# Patient Record
Sex: MALE | Race: WHITE | NOT HISPANIC OR LATINO | URBAN - METROPOLITAN AREA
[De-identification: names, ages, dates, MRNs, and addresses within clinical notes are randomized per-mention and may not be internally consistent; named-entity substitution may affect disease eponyms.]

---

## 2019-02-11 ENCOUNTER — INPATIENT (INPATIENT)
Facility: HOSPITAL | Age: 74
LOS: 3 days | Discharge: HOME | DRG: 246 | End: 2019-02-15
Attending: INTERNAL MEDICINE | Admitting: INTERNAL MEDICINE
Payer: MEDICARE

## 2019-02-11 VITALS
OXYGEN SATURATION: 95 % | DIASTOLIC BLOOD PRESSURE: 75 MMHG | HEIGHT: 71 IN | SYSTOLIC BLOOD PRESSURE: 169 MMHG | RESPIRATION RATE: 16 BRPM

## 2019-02-11 DIAGNOSIS — R55 SYNCOPE AND COLLAPSE: ICD-10-CM

## 2019-02-11 LAB
GLUCOSE BLDC GLUCOMTR-MCNC: 216 MG/DL — HIGH (ref 70–99)
GLUCOSE BLDC GLUCOMTR-MCNC: 277 MG/DL — HIGH (ref 70–99)

## 2019-02-11 PROCEDURE — 93010 ELECTROCARDIOGRAM REPORT: CPT

## 2019-02-11 PROCEDURE — 93880 EXTRACRANIAL BILAT STUDY: CPT | Mod: 26

## 2019-02-11 PROCEDURE — 93306 TTE W/DOPPLER COMPLETE: CPT | Mod: 26

## 2019-02-11 RX ORDER — DEXTROSE 50 % IN WATER 50 %
12.5 SYRINGE (ML) INTRAVENOUS ONCE
Qty: 0 | Refills: 0 | Status: DISCONTINUED | OUTPATIENT
Start: 2019-02-11 | End: 2019-02-15

## 2019-02-11 RX ORDER — DEXTROSE 50 % IN WATER 50 %
25 SYRINGE (ML) INTRAVENOUS ONCE
Qty: 0 | Refills: 0 | Status: DISCONTINUED | OUTPATIENT
Start: 2019-02-11 | End: 2019-02-15

## 2019-02-11 RX ORDER — GLUCAGON INJECTION, SOLUTION 0.5 MG/.1ML
1 INJECTION, SOLUTION SUBCUTANEOUS ONCE
Qty: 0 | Refills: 0 | Status: DISCONTINUED | OUTPATIENT
Start: 2019-02-11 | End: 2019-02-15

## 2019-02-11 RX ORDER — ASPIRIN/CALCIUM CARB/MAGNESIUM 324 MG
81 TABLET ORAL DAILY
Qty: 0 | Refills: 0 | Status: DISCONTINUED | OUTPATIENT
Start: 2019-02-11 | End: 2019-02-15

## 2019-02-11 RX ORDER — INSULIN GLARGINE 100 [IU]/ML
13 INJECTION, SOLUTION SUBCUTANEOUS AT BEDTIME
Qty: 0 | Refills: 0 | Status: DISCONTINUED | OUTPATIENT
Start: 2019-02-11 | End: 2019-02-13

## 2019-02-11 RX ORDER — INSULIN LISPRO 100/ML
VIAL (ML) SUBCUTANEOUS AT BEDTIME
Qty: 0 | Refills: 0 | Status: DISCONTINUED | OUTPATIENT
Start: 2019-02-11 | End: 2019-02-15

## 2019-02-11 RX ORDER — SODIUM CHLORIDE 9 MG/ML
1000 INJECTION, SOLUTION INTRAVENOUS
Qty: 0 | Refills: 0 | Status: DISCONTINUED | OUTPATIENT
Start: 2019-02-11 | End: 2019-02-15

## 2019-02-11 RX ORDER — MULTIVIT-MIN/FERROUS GLUCONATE 9 MG/15 ML
1 LIQUID (ML) ORAL DAILY
Qty: 0 | Refills: 0 | Status: DISCONTINUED | OUTPATIENT
Start: 2019-02-11 | End: 2019-02-15

## 2019-02-11 RX ORDER — INSULIN LISPRO 100/ML
VIAL (ML) SUBCUTANEOUS
Qty: 0 | Refills: 0 | Status: DISCONTINUED | OUTPATIENT
Start: 2019-02-11 | End: 2019-02-15

## 2019-02-11 RX ORDER — ATORVASTATIN CALCIUM 80 MG/1
40 TABLET, FILM COATED ORAL AT BEDTIME
Qty: 0 | Refills: 0 | Status: DISCONTINUED | OUTPATIENT
Start: 2019-02-11 | End: 2019-02-15

## 2019-02-11 RX ORDER — SODIUM CHLORIDE 9 MG/ML
1000 INJECTION INTRAMUSCULAR; INTRAVENOUS; SUBCUTANEOUS
Qty: 0 | Refills: 0 | Status: DISCONTINUED | OUTPATIENT
Start: 2019-02-11 | End: 2019-02-11

## 2019-02-11 RX ORDER — DEXTROSE 50 % IN WATER 50 %
15 SYRINGE (ML) INTRAVENOUS ONCE
Qty: 0 | Refills: 0 | Status: DISCONTINUED | OUTPATIENT
Start: 2019-02-11 | End: 2019-02-15

## 2019-02-11 RX ORDER — HYDRALAZINE HCL 50 MG
25 TABLET ORAL EVERY 8 HOURS
Qty: 0 | Refills: 0 | Status: DISCONTINUED | OUTPATIENT
Start: 2019-02-11 | End: 2019-02-15

## 2019-02-11 RX ORDER — ISOSORBIDE DINITRATE 5 MG/1
5 TABLET ORAL THREE TIMES A DAY
Qty: 0 | Refills: 0 | Status: DISCONTINUED | OUTPATIENT
Start: 2019-02-11 | End: 2019-02-15

## 2019-02-11 RX ORDER — CARVEDILOL PHOSPHATE 80 MG/1
3.12 CAPSULE, EXTENDED RELEASE ORAL EVERY 12 HOURS
Qty: 0 | Refills: 0 | Status: DISCONTINUED | OUTPATIENT
Start: 2019-02-11 | End: 2019-02-15

## 2019-02-11 RX ADMIN — Medication 1: at 21:25

## 2019-02-11 RX ADMIN — Medication 25 MILLIGRAM(S): at 17:04

## 2019-02-11 RX ADMIN — ATORVASTATIN CALCIUM 40 MILLIGRAM(S): 80 TABLET, FILM COATED ORAL at 21:26

## 2019-02-11 RX ADMIN — Medication 2: at 18:15

## 2019-02-11 RX ADMIN — ISOSORBIDE DINITRATE 5 MILLIGRAM(S): 5 TABLET ORAL at 17:04

## 2019-02-11 RX ADMIN — CARVEDILOL PHOSPHATE 3.12 MILLIGRAM(S): 80 CAPSULE, EXTENDED RELEASE ORAL at 21:26

## 2019-02-11 RX ADMIN — INSULIN GLARGINE 13 UNIT(S): 100 INJECTION, SOLUTION SUBCUTANEOUS at 21:26

## 2019-02-11 NOTE — CONSULT NOTE ADULT - SUBJECTIVE AND OBJECTIVE BOX
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 , serial CT head negative for acute intracranial pathology, no visible trauma, trop 0.137-->0.239-->0.160, -->249, CKMB 4.7--> 5.8, proBNP 1948, Echo (2/7) with EF 30%, moderate decreased LVSF, mild to moderate MV regurgitation, entire lateral wall, entire inferior wall, LAD distribution, and entire septum, chest xray negative.  Patient found to be in rupali with  s.Cr. 2.3 with baseline cr 1.7 on arrival at [facility]. Patient started on aspirin, coreg 3.125mg bid, hydralizine 25 tid, isosorbide dinatrate 5mg tid.  Patient transferred to [facility] today, 2/11 for C.  renal consult called.       PAST HISTORY  --------------------------------------------------------------------------------  PAST MEDICAL & SURGICAL HISTORY:  CKD (chronic kidney disease), stage IV  DM2 (diabetes mellitus, type 2)  Hyperlipidemia  Hypertension  CAD (Coronary Artery Disease)  Depression  Fracture of Humerus    FAMILY HISTORY:  + cardiac in father. mother alive and healthy     PAST SOCIAL HISTORY: denies tobacco or alcohol use     ALLERGIES & MEDICATIONS  --------------------------------------------------------------------------------  Allergies    PCN,SULFA (Unknown)  penicillins (Hives)    Intolerances      Standing Inpatient Medications  aspirin enteric coated 81 milliGRAM(s) Oral daily  atorvastatin 40 milliGRAM(s) Oral at bedtime  carvedilol 3.125 milliGRAM(s) Oral every 12 hours  dextrose 5%. 1000 milliLiter(s) IV Continuous <Continuous>  dextrose 50% Injectable 12.5 Gram(s) IV Push once  dextrose 50% Injectable 25 Gram(s) IV Push once  dextrose 50% Injectable 25 Gram(s) IV Push once  hydrALAZINE 25 milliGRAM(s) Oral every 8 hours  insulin glargine Injectable (LANTUS) 13 Unit(s) SubCutaneous at bedtime  insulin lispro (HumaLOG) corrective regimen sliding scale   SubCutaneous three times a day before meals  insulin lispro (HumaLOG) corrective regimen sliding scale   SubCutaneous at bedtime  isosorbide   dinitrate Tablet (ISORDIL) 5 milliGRAM(s) Oral three times a day  multivitamin/minerals 1 Tablet(s) Oral daily  PARoxetine 20 milliGRAM(s) Oral daily    PRN Inpatient Medications  dextrose 40% Gel 15 Gram(s) Oral once PRN  glucagon  Injectable 1 milliGRAM(s) IntraMuscular once PRN      REVIEW OF SYSTEMS  --------------------------------------------------------------------------------  Gen: No  fevers/chills   Skin: No rashes  Head/Eyes/Ears/Mouth: No headache; Normal hearing;  No sinus pain/discomfort, sore throat  Respiratory: No dyspnea, cough, wheezing  CV: No chest pain, or palp   GI: No abdominal pain, diarrhea, nausea, vomiting  : No dysuria, decrease urination or hesitancy urinating  hematuria, nocturia  MSK: No joint pain/swelling; no back pain  Neuro: No dizziness/lightheadedness,     also with no edema     All other systems were reviewed and are negative, except as noted.    VITALS/PHYSICAL EXAM  --------------------------------------------------------------------------------  T(C): --  HR: --  BP: 139/56 (02-11-19 @ 19:30) (139/56 - 169/75)  RR: 18 (02-11-19 @ 19:30) (16 - 18)  SpO2: 98% (02-11-19 @ 19:30) (95% - 98%)  Wt(kg): --  Height (cm): 180.34 (02-11-19 @ 12:52)  Weight (kg): 97.5 (02-11-19 @ 12:52)  BMI (kg/m2): 30 (02-11-19 @ 12:52)  BSA (m2): 2.17 (02-11-19 @ 12:52)      02-11-19 @ 07:01  -  02-11-19 @ 21:10  --------------------------------------------------------  IN: 480 mL / OUT: 0 mL / NET: 480 mL      Physical Exam:  	Gen: Non toxic comfortable appearing   	no jvd   	Pulm: cta   no rales or ronchi or wheezing  	CV: RRR, S1S2; no rub  	Back: No CVA tenderness; no sacral edema  	Abd: +BS, soft, nontender/nondistended  	: No suprapubic tenderness  	UE: Warm, no cyanosis  no clubbing,  no edema  	LE: Warm, no cyanosis  no clubbing, no edema  	Neuro: alert and oriented. speech coherent   	Skin: Warm, no decrease skin turgor       LABS/STUDIES  --------------------------------------------------------------------------------                Creatinine Trend:

## 2019-02-11 NOTE — H&P CARDIOLOGY - HISTORY OF PRESENT ILLNESS
74 yo  male with PMHx of CAD (prior angioplasty but no stents, 15 years ago), HTN, depression, DMT2, HLD who was brought to [facility] after syncopal episode on 2/6, serial CT head negative for acute intracranial pathology, no visible trauma, trop 0.137-->0.239-->0.160, -->249, CKMB 4.7--> 5.8, proBNP 1948, Echo (2/7) with EF 30%, moderate decreased LVSF, mild to moderate MV regurgitation, entire lateral wall, entire inferior wall, LAD distribution, and entire septum, chest xray negative.  Patient found to be in CKD stage IV, gfr 28, s.Cr. 2.3.  Patient started on aspirin, coreg 3.125mg bid, hydralizine 25 tid, isosorbide dinatrate 5mg tid.  Patient transferred to [facility] today, 2/11 for LHC.  Patient denies any discomfort at this time.     PCP Dr Acevedo   Does not have Cardiologist 72 yo  male with PMHx of CAD (prior angioplasty but no stents, 15 years ago), HTN, depression, DMT2, HLD who was brought to [facility] after syncopal episode on 2/6, serial CT head negative for acute intracranial pathology, no visible trauma, trop 0.137-->0.239-->0.160, -->249, CKMB 4.7--> 5.8, proBNP 1948, Echo (2/7) with EF 30%, moderate decreased LVSF, mild to moderate MV regurgitation, entire lateral wall, entire inferior wall, LAD distribution, and entire septum, chest xray negative.  Patient found to be in CKD stage IV, gfr 28, s.Cr. 2.3.  Patient started on aspirin, coreg 3.125mg bid, hydralizine 25 tid, isosorbide dinatrate 5mg tid.  Patient transferred to [facility] today, 2/11 for LHC.  Patient denies any discomfort at this time.     PCP Heather Sloan [phone]  Cardiologist will be Dr Goldberg, Steven

## 2019-02-11 NOTE — H&P CARDIOLOGY - PMH
CAD (Coronary Artery Disease)    CKD (chronic kidney disease), stage IV    Depression    DM2 (diabetes mellitus, type 2)    Hyperlipidemia    Hypertension

## 2019-02-11 NOTE — CONSULT NOTE ADULT - ASSESSMENT
72 yo  male with PMHx of CAD (prior angioplasty but no stents, 15 years ago), HTN, depression, DMT2, HLD with syncopal episode on 2/6 while being now for coronary angiogram but also with SHAY on ckd III    1- SHAY on CKD III  2- HTN   3- cardiomyopathy     repeat creatinine in am. if cr cont to rise then hold angiogram unless urgently needed   ? received diuretics  at [facility] ? ATN due to syncope.   cont with hydralazine and coreg.   ivf hydration in am at 60 cc/hr 3 hours before and 6 hours after coronary angiogram   he will stand at risk for worsening renal function with coronary angiogram. this was d/w pt in detail. he understands   subnephrotic proteinuria at [facility] noted. ARB once creatinine stable and cardiac work up completed   DM cont to hold metformin   d/w cards  team   d/w Dr. Sandra  d/w pt wife at bedside as well

## 2019-02-11 NOTE — PROGRESS NOTE ADULT - SUBJECTIVE AND OBJECTIVE BOX
Chief Complaint: Patient is a 73y old  Male who presents with a chief complaint of 4 yo  male with PMHx of CAD (prior angioplasty but no stents, 15 years ago), HTN, depression, DMT2, HLD who was brought to [facility] after syncopal episode on 2/6, serial CT head negative for acute intracranial pathology, no visible trauma, trop 0.137-->0.239-->0.160, -->249, CKMB 4.7--> 5.8, proBNP 1948, Echo (2/7) with EF 30%, moderate decreased LVSF, mild to moderate MV regurgitation, entire lateral wall, entire inferior wall, LAD distribution, and entire septum, chest xray negative.  Patient found to be in CKD stage IV, gfr 28, s.Cr. 2.3.  Patient started on aspirin, coreg 3.125mg bid, hydralizine 25 tid, isosorbide dinatrate 5mg tid.  Patient transferred to [facility] today, 2/11 for LHC.  Patient denies any discomfort at this time.   4 yo  male with PMHx of CAD (prior angioplasty but no stents, 15 years ago), HTN, depression, DMT2, HLD who was brought to [facility] after syncopal episode on 2/6, serial CT head negative for acute intracranial pathology, no visible trauma, trop 0.137-->0.239-->0.160, -->249, CKMB 4.7--> 5.8, proBNP 1948, Echo (2/7) with EF 30%, moderate decreased LVSF, mild to moderate MV regurgitation, entire lateral wall, entire inferior wall, LAD distribution, and entire septum, chest xray negative.  Patient found to be in CKD stage IV, gfr 28, s.Cr. 2.3.  Patient started on aspirin, coreg 3.125mg bid, hydralizine 25 tid, isosorbide dinatrate 5mg tid.  Patient transferred to [facility] today, 2/11 for LHC.  Patient denies any discomfort at this time.       Active Meds  MEDICATIONS  (STANDING):  aspirin enteric coated 81 milliGRAM(s) Oral daily  atorvastatin 40 milliGRAM(s) Oral at bedtime  carvedilol 3.125 milliGRAM(s) Oral every 12 hours  dextrose 5%. 1000 milliLiter(s) (50 mL/Hr) IV Continuous <Continuous>  dextrose 50% Injectable 12.5 Gram(s) IV Push once  dextrose 50% Injectable 25 Gram(s) IV Push once  dextrose 50% Injectable 25 Gram(s) IV Push once  hydrALAZINE 25 milliGRAM(s) Oral every 8 hours  insulin glargine Injectable (LANTUS) 13 Unit(s) SubCutaneous at bedtime  insulin lispro (HumaLOG) corrective regimen sliding scale   SubCutaneous three times a day before meals  insulin lispro (HumaLOG) corrective regimen sliding scale   SubCutaneous at bedtime  isosorbide   dinitrate Tablet (ISORDIL) 5 milliGRAM(s) Oral three times a day  multivitamin/minerals 1 Tablet(s) Oral daily  PARoxetine 20 milliGRAM(s) Oral daily  sodium chloride 0.9%. 1000 milliLiter(s) (50 mL/Hr) IV Continuous <Continuous>    MEDICATIONS  (PRN):  dextrose 40% Gel 15 Gram(s) Oral once PRN Blood Glucose LESS THAN 70 milliGRAM(s)/deciliter  glucagon  Injectable 1 milliGRAM(s) IntraMuscular once PRN Glucose LESS THAN 70 milligrams/deciliter      Allergies  Allergies    PCN,SULFA (Unknown)  penicillins (Hives)    Intolerances        I/Os  I&O's Summary      Vital Signs  T(C): --  HR: --  BP: 169/75 (02-11-19 @ 12:52) (169/75 - 169/75)  RR: 16 (02-11-19 @ 12:52) (16 - 16)  SpO2: 95% (02-11-19 @ 12:52) (95% - 95%)  Wt(kg): --    PHYSICAL EXAM:  General: obese  HEENT: negative  Lymph Nodes:negative  Neck:   Respiratory: scattered crackles  Cardiovascular: rrr  Abdomen: + bowel sounds soft  Extremities: -  Skin: normal  Neurological:nonfocal  Psychiatry:normal        Labspending      Radiology:chest xray pending      cardiology:repeat echo pending      Assessments  Syncope and collapse  ef 30% abnormal stress history 2 angioplasty 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

## 2019-02-12 DIAGNOSIS — E11.9 TYPE 2 DIABETES MELLITUS WITHOUT COMPLICATIONS: ICD-10-CM

## 2019-02-12 DIAGNOSIS — N18.4 CHRONIC KIDNEY DISEASE, STAGE 4 (SEVERE): ICD-10-CM

## 2019-02-12 DIAGNOSIS — I25.10 ATHEROSCLEROTIC HEART DISEASE OF NATIVE CORONARY ARTERY WITHOUT ANGINA PECTORIS: ICD-10-CM

## 2019-02-12 DIAGNOSIS — I42.9 CARDIOMYOPATHY, UNSPECIFIED: ICD-10-CM

## 2019-02-12 DIAGNOSIS — E78.5 HYPERLIPIDEMIA, UNSPECIFIED: ICD-10-CM

## 2019-02-12 DIAGNOSIS — I10 ESSENTIAL (PRIMARY) HYPERTENSION: ICD-10-CM

## 2019-02-12 LAB
ANION GAP SERPL CALC-SCNC: 15 MMOL/L — SIGNIFICANT CHANGE UP (ref 5–17)
APPEARANCE UR: CLEAR — SIGNIFICANT CHANGE UP
BILIRUB UR-MCNC: NEGATIVE — SIGNIFICANT CHANGE UP
BUN SERPL-MCNC: 44 MG/DL — HIGH (ref 7–23)
CALCIUM SERPL-MCNC: 9.1 MG/DL — SIGNIFICANT CHANGE UP (ref 8.4–10.5)
CHLORIDE SERPL-SCNC: 103 MMOL/L — SIGNIFICANT CHANGE UP (ref 96–108)
CO2 SERPL-SCNC: 19 MMOL/L — LOW (ref 22–31)
COLOR SPEC: COLORLESS — SIGNIFICANT CHANGE UP
CREAT SERPL-MCNC: 2.11 MG/DL — HIGH (ref 0.5–1.3)
DIFF PNL FLD: NEGATIVE — SIGNIFICANT CHANGE UP
FERRITIN SERPL-MCNC: 79 NG/ML — SIGNIFICANT CHANGE UP (ref 30–400)
FOLATE SERPL-MCNC: 17.3 NG/ML — SIGNIFICANT CHANGE UP
GLUCOSE BLDC GLUCOMTR-MCNC: 224 MG/DL — HIGH (ref 70–99)
GLUCOSE BLDC GLUCOMTR-MCNC: 243 MG/DL — HIGH (ref 70–99)
GLUCOSE BLDC GLUCOMTR-MCNC: 266 MG/DL — HIGH (ref 70–99)
GLUCOSE BLDC GLUCOMTR-MCNC: 274 MG/DL — HIGH (ref 70–99)
GLUCOSE SERPL-MCNC: 247 MG/DL — HIGH (ref 70–99)
GLUCOSE UR QL: ABNORMAL
HBA1C BLD-MCNC: 7.3 % — HIGH (ref 4–5.6)
HCT VFR BLD CALC: 29.3 % — LOW (ref 39–50)
HGB BLD-MCNC: 10.3 G/DL — LOW (ref 13–17)
IRON SATN MFR SERPL: 19 % — SIGNIFICANT CHANGE UP (ref 16–55)
IRON SATN MFR SERPL: 50 UG/DL — SIGNIFICANT CHANGE UP (ref 45–165)
KETONES UR-MCNC: NEGATIVE — SIGNIFICANT CHANGE UP
LEUKOCYTE ESTERASE UR-ACNC: NEGATIVE — SIGNIFICANT CHANGE UP
MAGNESIUM SERPL-MCNC: 2 MG/DL — SIGNIFICANT CHANGE UP (ref 1.6–2.6)
MCHC RBC-ENTMCNC: 31.7 PG — SIGNIFICANT CHANGE UP (ref 27–34)
MCHC RBC-ENTMCNC: 35.2 GM/DL — SIGNIFICANT CHANGE UP (ref 32–36)
MCV RBC AUTO: 90.1 FL — SIGNIFICANT CHANGE UP (ref 80–100)
NITRITE UR-MCNC: NEGATIVE — SIGNIFICANT CHANGE UP
PH UR: 6 — SIGNIFICANT CHANGE UP (ref 5–8)
PLATELET # BLD AUTO: 220 K/UL — SIGNIFICANT CHANGE UP (ref 150–400)
POTASSIUM SERPL-MCNC: 4.6 MMOL/L — SIGNIFICANT CHANGE UP (ref 3.5–5.3)
POTASSIUM SERPL-SCNC: 4.6 MMOL/L — SIGNIFICANT CHANGE UP (ref 3.5–5.3)
PROT UR-MCNC: ABNORMAL
RBC # BLD: 3.25 M/UL — LOW (ref 4.2–5.8)
RBC # FLD: 13.5 % — SIGNIFICANT CHANGE UP (ref 10.3–14.5)
SODIUM SERPL-SCNC: 137 MMOL/L — SIGNIFICANT CHANGE UP (ref 135–145)
SP GR SPEC: 1.01 — SIGNIFICANT CHANGE UP (ref 1.01–1.02)
T3 SERPL-MCNC: 71 NG/DL — LOW (ref 80–200)
T4 AB SER-ACNC: 4.4 UG/DL — LOW (ref 4.6–12)
TIBC SERPL-MCNC: 269 UG/DL — SIGNIFICANT CHANGE UP (ref 220–430)
TRANSFERRIN SERPL-MCNC: 224 MG/DL — SIGNIFICANT CHANGE UP (ref 200–360)
TSH SERPL-MCNC: 2.74 UIU/ML — SIGNIFICANT CHANGE UP (ref 0.27–4.2)
UIBC SERPL-MCNC: 219 UG/DL — SIGNIFICANT CHANGE UP (ref 110–370)
UROBILINOGEN FLD QL: NEGATIVE — SIGNIFICANT CHANGE UP
VIT B12 SERPL-MCNC: 595 PG/ML — SIGNIFICANT CHANGE UP (ref 232–1245)
WBC # BLD: 5.5 K/UL — SIGNIFICANT CHANGE UP (ref 3.8–10.5)
WBC # FLD AUTO: 5.5 K/UL — SIGNIFICANT CHANGE UP (ref 3.8–10.5)

## 2019-02-12 PROCEDURE — 99152 MOD SED SAME PHYS/QHP 5/>YRS: CPT | Mod: GC

## 2019-02-12 PROCEDURE — 93454 CORONARY ARTERY ANGIO S&I: CPT | Mod: 26,GC

## 2019-02-12 PROCEDURE — 71046 X-RAY EXAM CHEST 2 VIEWS: CPT | Mod: 26

## 2019-02-12 RX ORDER — SODIUM CHLORIDE 9 MG/ML
1000 INJECTION INTRAMUSCULAR; INTRAVENOUS; SUBCUTANEOUS
Qty: 0 | Refills: 0 | Status: COMPLETED | OUTPATIENT
Start: 2019-02-12 | End: 2019-02-12

## 2019-02-12 RX ORDER — SODIUM CHLORIDE 9 MG/ML
1000 INJECTION INTRAMUSCULAR; INTRAVENOUS; SUBCUTANEOUS
Qty: 0 | Refills: 0 | Status: DISCONTINUED | OUTPATIENT
Start: 2019-02-12 | End: 2019-02-15

## 2019-02-12 RX ORDER — SODIUM CHLORIDE 9 MG/ML
1000 INJECTION INTRAMUSCULAR; INTRAVENOUS; SUBCUTANEOUS
Qty: 0 | Refills: 0 | Status: DISCONTINUED | OUTPATIENT
Start: 2019-02-12 | End: 2019-02-12

## 2019-02-12 RX ADMIN — Medication 3: at 11:40

## 2019-02-12 RX ADMIN — Medication 2: at 07:33

## 2019-02-12 RX ADMIN — CARVEDILOL PHOSPHATE 3.12 MILLIGRAM(S): 80 CAPSULE, EXTENDED RELEASE ORAL at 21:24

## 2019-02-12 RX ADMIN — Medication 25 MILLIGRAM(S): at 10:13

## 2019-02-12 RX ADMIN — Medication 1: at 21:25

## 2019-02-12 RX ADMIN — Medication 1 TABLET(S): at 10:13

## 2019-02-12 RX ADMIN — Medication 81 MILLIGRAM(S): at 05:55

## 2019-02-12 RX ADMIN — ISOSORBIDE DINITRATE 5 MILLIGRAM(S): 5 TABLET ORAL at 21:24

## 2019-02-12 RX ADMIN — Medication 25 MILLIGRAM(S): at 02:39

## 2019-02-12 RX ADMIN — INSULIN GLARGINE 13 UNIT(S): 100 INJECTION, SOLUTION SUBCUTANEOUS at 21:24

## 2019-02-12 RX ADMIN — Medication 20 MILLIGRAM(S): at 10:13

## 2019-02-12 RX ADMIN — Medication 25 MILLIGRAM(S): at 21:24

## 2019-02-12 RX ADMIN — ATORVASTATIN CALCIUM 40 MILLIGRAM(S): 80 TABLET, FILM COATED ORAL at 21:24

## 2019-02-12 RX ADMIN — ISOSORBIDE DINITRATE 5 MILLIGRAM(S): 5 TABLET ORAL at 02:40

## 2019-02-12 RX ADMIN — Medication 25 MILLIGRAM(S): at 17:51

## 2019-02-12 RX ADMIN — Medication 2: at 17:54

## 2019-02-12 RX ADMIN — ISOSORBIDE DINITRATE 5 MILLIGRAM(S): 5 TABLET ORAL at 17:51

## 2019-02-12 RX ADMIN — ISOSORBIDE DINITRATE 5 MILLIGRAM(S): 5 TABLET ORAL at 10:13

## 2019-02-12 RX ADMIN — SODIUM CHLORIDE 60 MILLILITER(S): 9 INJECTION INTRAMUSCULAR; INTRAVENOUS; SUBCUTANEOUS at 10:08

## 2019-02-12 RX ADMIN — CARVEDILOL PHOSPHATE 3.12 MILLIGRAM(S): 80 CAPSULE, EXTENDED RELEASE ORAL at 10:14

## 2019-02-12 RX ADMIN — SODIUM CHLORIDE 60 MILLILITER(S): 9 INJECTION INTRAMUSCULAR; INTRAVENOUS; SUBCUTANEOUS at 16:59

## 2019-02-12 NOTE — PROGRESS NOTE ADULT - SUBJECTIVE AND OBJECTIVE BOX
Chief Complaint: Patient is a 73y old  Male who presents with a chief complaint of   72 yo  male with PMHx of CAD (prior angioplasty but no stents, 15 years ago), HTN, depression, DMT2, HLD who was brought to [facility] after syncopal episode on 2/6, serial CT head negative for acute intracranial pathology, no visible trauma, trop 0.137-->0.239-->0.160, -->249, CKMB 4.7--> 5.8, proBNP 1948, Echo (2/7) with EF 30%, moderate decreased LVSF, mild to moderate MV regurgitation, entire lateral wall, entire inferior wall, LAD distribution, and entire septum, chest xray negative.  Patient found to be in CKD stage IV, gfr 28, s.Cr. 2.3.  Patient started on aspirin, coreg 3.125mg bid, hydralizine 25 tid, isosorbide dinatrate 5mg tid.  Patient transferred to [facility] today, 2/11 for LHC.  Patient denies any discomfort at this time.     Active Meds  MEDICATIONS  (STANDING):  aspirin enteric coated 81 milliGRAM(s) Oral daily  atorvastatin 40 milliGRAM(s) Oral at bedtime  carvedilol 3.125 milliGRAM(s) Oral every 12 hours  dextrose 5%. 1000 milliLiter(s) (50 mL/Hr) IV Continuous <Continuous>  dextrose 50% Injectable 12.5 Gram(s) IV Push once  dextrose 50% Injectable 25 Gram(s) IV Push once  dextrose 50% Injectable 25 Gram(s) IV Push once  hydrALAZINE 25 milliGRAM(s) Oral every 8 hours  insulin glargine Injectable (LANTUS) 13 Unit(s) SubCutaneous at bedtime  insulin lispro (HumaLOG) corrective regimen sliding scale   SubCutaneous three times a day before meals  insulin lispro (HumaLOG) corrective regimen sliding scale   SubCutaneous at bedtime  isosorbide   dinitrate Tablet (ISORDIL) 5 milliGRAM(s) Oral three times a day  multivitamin/minerals 1 Tablet(s) Oral daily  PARoxetine 20 milliGRAM(s) Oral daily  sodium chloride 0.9%. 1000 milliLiter(s) (60 mL/Hr) IV Continuous <Continuous>    MEDICATIONS  (PRN):  dextrose 40% Gel 15 Gram(s) Oral once PRN Blood Glucose LESS THAN 70 milliGRAM(s)/deciliter  glucagon  Injectable 1 milliGRAM(s) IntraMuscular once PRN Glucose LESS THAN 70 milligrams/deciliter      Allergies  Allergies    PCN,SULFA (Unknown)  penicillins (Hives)    Intolerances        I/Os  I&O's Summary    11 Feb 2019 07:01  -  12 Feb 2019 07:00  --------------------------------------------------------  IN: 660 mL / OUT: 0 mL / NET: 660 mL    12 Feb 2019 07:01  -  12 Feb 2019 18:06  --------------------------------------------------------  IN: 480 mL / OUT: 0 mL / NET: 480 mL        Vital Signs  T(C): 36.7 (02-12-19 @ 13:25), Max: 37.2 (02-11-19 @ 21:25)  HR: 60 (02-12-19 @ 16:55) (60 - 85)  BP: 129/64 (02-12-19 @ 16:55) (118/57 - 165/75)  RR: 16 (02-12-19 @ 16:55) (16 - 18)  SpO2: 98% (02-12-19 @ 16:55) (95% - 100%)  Wt(kg): --                        10.3   5.5   )-----------( 220      ( 12 Feb 2019 03:03 )             29.3   02-12    137  |  103  |  44<H>  ----------------------------<  247<H>  4.6   |  19<L>  |  2.11<H>    Ca    9.1      12 Feb 2019 03:03  Mg     2.0     02-12      PHYSICAL EXAM:  General: normal  HEENT: -  Lymph Nodes:  Neck:   Respiratory: clear  Cardiovascular: rrr  Abdomen: +bs  Extremities: -  Skin: normal  Neurological:  Psychiatry:        Labs       Radiology:      cardiology:      Assessments  Syncope and collapse  Cardiomyopathy, unspecified type 32% ef on echo  CKD (chronic kidney disease), stage IV  Coronary artery disease involving native coronary artery of native heart without angina pectoris carotids plaque but no hemodynamically significant disease  Type 2 diabetes mellitus without complication, without long-term current use of insulin a1c 7.3 stable  Hyperlipidemia, unspecified hyperlipidemia type  Hypertension, unspecified type well controlled  CAD (Coronary Artery Disease) 99.9 %circ 99% rca lad ok difficult stent so   plan viability study

## 2019-02-12 NOTE — CONSULT NOTE ADULT - ASSESSMENT
74 yo  male with PMHx of CAD (prior angioplasty but no stents, 15 years ago), HTN, depression, DMT2, HLD who was brought to [facility] after syncopal episode on 2/6, found to have depressed LVEF and renal dysfunction.

## 2019-02-12 NOTE — PROGRESS NOTE ADULT - SUBJECTIVE AND OBJECTIVE BOX
[facility]   [phone]  RENAL FOLLOW UP NOTE  --------------------------------------------------------------------------------  Chief Complaint:    24 hour events/subjective:    seen. no sob no dizziness or lightheadedness states urinating well    PAST HISTORY  --------------------------------------------------------------------------------  No significant changes to PMH, PSH, FHx, SHx, unless otherwise noted    ALLERGIES & MEDICATIONS  --------------------------------------------------------------------------------  Allergies    PCN,SULFA (Unknown)  penicillins (Hives)    Intolerances      Standing Inpatient Medications  aspirin enteric coated 81 milliGRAM(s) Oral daily  atorvastatin 40 milliGRAM(s) Oral at bedtime  carvedilol 3.125 milliGRAM(s) Oral every 12 hours  dextrose 5%. 1000 milliLiter(s) IV Continuous <Continuous>  dextrose 50% Injectable 12.5 Gram(s) IV Push once  dextrose 50% Injectable 25 Gram(s) IV Push once  dextrose 50% Injectable 25 Gram(s) IV Push once  hydrALAZINE 25 milliGRAM(s) Oral every 8 hours  insulin glargine Injectable (LANTUS) 13 Unit(s) SubCutaneous at bedtime  insulin lispro (HumaLOG) corrective regimen sliding scale   SubCutaneous three times a day before meals  insulin lispro (HumaLOG) corrective regimen sliding scale   SubCutaneous at bedtime  isosorbide   dinitrate Tablet (ISORDIL) 5 milliGRAM(s) Oral three times a day  multivitamin/minerals 1 Tablet(s) Oral daily  PARoxetine 20 milliGRAM(s) Oral daily  sodium chloride 0.9%. 1000 milliLiter(s) IV Continuous <Continuous>  sodium chloride 0.9%. 1000 milliLiter(s) IV Continuous <Continuous>    PRN Inpatient Medications  dextrose 40% Gel 15 Gram(s) Oral once PRN  glucagon  Injectable 1 milliGRAM(s) IntraMuscular once PRN      REVIEW OF SYSTEMS  --------------------------------------------------------------------------------    Gen: denies  fevers/chills,  CVS: denies chest pain/palpitations  Resp: denies SOB/Cough  GI: Denies N/V/Abd pain  : Denies dysuria/oliguria/hematuria    All other systems were reviewed and are negative, except as noted.    VITALS/PHYSICAL EXAM  --------------------------------------------------------------------------------  T(C): 36.8 (02-12-19 @ 05:22), Max: 37.2 (02-11-19 @ 21:25)  HR: 67 (02-12-19 @ 10:08) (67 - 85)  BP: 157/63 (02-12-19 @ 10:08) (129/55 - 169/75)  RR: 17 (02-12-19 @ 05:22) (16 - 18)  SpO2: 97% (02-12-19 @ 05:22) (95% - 98%)  Wt(kg): --  Height (cm): 180.34 (02-11-19 @ 12:52)  Weight (kg): 97.5 (02-11-19 @ 12:52)  BMI (kg/m2): 30 (02-11-19 @ 12:52)  BSA (m2): 2.17 (02-11-19 @ 12:52)      02-11-19 @ 07:01  -  02-12-19 @ 07:00  --------------------------------------------------------  IN: 660 mL / OUT: 0 mL / NET: 660 mL    02-12-19 @ 07:01  -  02-12-19 @ 11:30  --------------------------------------------------------  IN: 240 mL / OUT: 0 mL / NET: 240 mL      Physical Exam:  	  	Gen: Non toxic comfortable appearing   	no jvd   	Pulm: decrease bs   no rales or ronchi or wheezing  	CV: RRR, S1S2; no rub  	Abd: +BS, soft, nontender/nondistended  	: No suprapubic tenderness  	UE: Warm, no cyanosis  no clubbing,  no edema  	LE: Warm, no cyanosis  no clubbing, no edema  	Neuro: alert and oriented. speech coherent     	  LABS/STUDIES  --------------------------------------------------------------------------------              10.3   5.5   >-----------<  220      [02-12-19 @ 03:03]              29.3     137  |  103  |  44  ----------------------------<  247      [02-12-19 @ 03:03]  4.6   |  19  |  2.11        Ca     9.1     [02-12-19 @ 03:03]      Mg     2.0     [02-12-19 @ 03:03]            Creatinine Trend:  SCr 2.11 [02-12 @ 03:03]              Urinalysis - [02-12-19 @ 03:03]      Color Colorless / Appearance Clear / SG 1.013 / pH 6.0      Gluc 200 mg/dL / Ketone Negative  / Bili Negative / Urobili Negative       Blood Negative / Protein 30 mg/dL / Leuk Est Negative / Nitrite Negative      RBC 1 / WBC 0 / Hyaline 0 / Gran  / Sq Epi  / Non Sq Epi 0 / Bacteria 0.0      Iron 50, TIBC 269, %sat 19      [02-12-19 @ 05:27]  Ferritin 79      [02-12-19 @ 05:27]  HbA1c 7.3      [02-12-19 @ 07:27]  TSH 2.74      [02-12-19 @ 05:27]

## 2019-02-12 NOTE — PROGRESS NOTE ADULT - ASSESSMENT
HPI:  72 yo  male with PMHx of CAD (prior angioplasty but no stents, 15 years ago), HTN, depression, DMT2, HLD who was brought to [facility] after syncopal episode on 2/6, serial CT head negative for acute intracranial pathology, no visible trauma, trop 0.137-->0.239-->0.160, -->249, CKMB 4.7--> 5.8, proBNP 1948, Echo (2/7) with EF 30%, moderate decreased LVSF, mild to moderate MV regurgitation, entire lateral wall, entire inferior wall, LAD distribution, and entire septum, chest xray negative.  Patient found to be in CKD stage IV, gfr 28, s.Cr. 2.3.  Patient started on aspirin, coreg 3.125mg bid, hydralizine 25 tid, isosorbide dinatrate 5mg tid.  Patient transferred to [facility] today, 2/11 for LHC.  Patient denies any discomfort at this time.     PCP Dr Heather Sandra [phone]  Cardiologist will be Dr Steven Goldberg (11 Feb 2019 12:52)

## 2019-02-12 NOTE — PROGRESS NOTE ADULT - SUBJECTIVE AND OBJECTIVE BOX
Patient is a 73y old  Male who presents with a chief complaint of syncopal episode        Allergies    PCN,SULFA (Unknown)  penicillins (Hives)    Intolerances        Medications:  aspirin enteric coated 81 milliGRAM(s) Oral daily  atorvastatin 40 milliGRAM(s) Oral at bedtime  carvedilol 3.125 milliGRAM(s) Oral every 12 hours  dextrose 40% Gel 15 Gram(s) Oral once PRN  dextrose 5%. 1000 milliLiter(s) IV Continuous <Continuous>  dextrose 50% Injectable 12.5 Gram(s) IV Push once  dextrose 50% Injectable 25 Gram(s) IV Push once  dextrose 50% Injectable 25 Gram(s) IV Push once  glucagon  Injectable 1 milliGRAM(s) IntraMuscular once PRN  hydrALAZINE 25 milliGRAM(s) Oral every 8 hours  insulin glargine Injectable (LANTUS) 13 Unit(s) SubCutaneous at bedtime  insulin lispro (HumaLOG) corrective regimen sliding scale   SubCutaneous three times a day before meals  insulin lispro (HumaLOG) corrective regimen sliding scale   SubCutaneous at bedtime  isosorbide   dinitrate Tablet (ISORDIL) 5 milliGRAM(s) Oral three times a day  multivitamin/minerals 1 Tablet(s) Oral daily  PARoxetine 20 milliGRAM(s) Oral daily      Vitals:  T(C): 37.2 (19 @ 21:25), Max: 37.2 (19 @ 21:25)  HR: 85 (19 @ 21:25) (85 - 85)  BP: 165/75 (19 @ 21:25) (139/56 - 169/75)  BP(mean): 106 (19 @ 12:52) (106 - 106)  RR: 16 (19 @ 21:25) (16 - 18)  SpO2: 98% (19 @ 21:25) (95% - 98%)  Wt(kg): --  Daily Height in cm: 180.34 (2019 12:52)    Daily Weight in k.5 (2019 12:52)  I&O's Summary    2019 07:01  -  2019 00:25  --------------------------------------------------------  IN: 480 mL / OUT: 0 mL / NET: 480 mL          Physical Exam:  Appearance: Normal  Eyes: PERRL, EOMI  HENT: Normal oral muscosa, NC/AT  Cardiovascular: S1S2, RRR, No M/R/G, no JVD, No Lower extremity edema  Procedural Access Site: No hematoma, Non-tender to palpation, 2+ pulse, No bruit, No Ecchymosis  Respiratory: Clear to auscultation bilaterally  Gastrointestinal: Soft, Non tender, Normal Bowel Sounds  Musculoskeletal: No clubbing, No joint deformity   Neurologic: Non-focal  Lymphatic: No lymphadenopathy  Psychiatry: AAOx3, Mood & affect appropriate  Skin: No rashes, No ecchymoses, No cyanosis                  Lipid panel   Hgb A1c         ECG: SR 67 bpm    Echo: EF 30%      Cath: scheduled for     Imaging: Head CT negative    Interpretation of Telemetry: Patient is a 73y old  Male who presents with a chief complaint of syncopal episode        Allergies    PCN,SULFA (Unknown)  penicillins (Hives)    Intolerances        Medications:  aspirin enteric coated 81 milliGRAM(s) Oral daily  atorvastatin 40 milliGRAM(s) Oral at bedtime  carvedilol 3.125 milliGRAM(s) Oral every 12 hours  dextrose 40% Gel 15 Gram(s) Oral once PRN  dextrose 5%. 1000 milliLiter(s) IV Continuous <Continuous>  dextrose 50% Injectable 12.5 Gram(s) IV Push once  dextrose 50% Injectable 25 Gram(s) IV Push once  dextrose 50% Injectable 25 Gram(s) IV Push once  glucagon  Injectable 1 milliGRAM(s) IntraMuscular once PRN  hydrALAZINE 25 milliGRAM(s) Oral every 8 hours  insulin glargine Injectable (LANTUS) 13 Unit(s) SubCutaneous at bedtime  insulin lispro (HumaLOG) corrective regimen sliding scale   SubCutaneous three times a day before meals  insulin lispro (HumaLOG) corrective regimen sliding scale   SubCutaneous at bedtime  isosorbide   dinitrate Tablet (ISORDIL) 5 milliGRAM(s) Oral three times a day  multivitamin/minerals 1 Tablet(s) Oral daily  PARoxetine 20 milliGRAM(s) Oral daily      Vitals:  T(C): 37.2 (19 @ 21:25), Max: 37.2 (19 @ 21:25)  HR: 70 (19 @ 02:38) (70 - 85)  BP: 134/60 (19 @ 02:38) (134/60 - 169/75)  BP(mean): 106 (19 @ 12:52) (106 - 106)  RR: 18 (19 @ 02:38) (16 - 18)  SpO2: 98% (19 @ 02:38) (95% - 98%)  Wt(kg): --  Daily Height in cm: 180.34 (2019 12:52)    Daily Weight in k.5 (2019 12:52)  I&O's Summary    2019 07:01  -  2019 03:53  --------------------------------------------------------  IN: 480 mL / OUT: 0 mL / NET: 480 mL          Physical Exam:  Appearance: Normal  Eyes: PERRL, EOMI  HENT: Normal oral muscosa, NC/AT  Cardiovascular: S1S2, RRR, No M/R/G, no JVD, No Lower extremity edema  Procedural Access Site: No hematoma, Non-tender to palpation, 2+ pulse, No bruit, No Ecchymosis  Respiratory: Clear to auscultation bilaterally  Gastrointestinal: Soft, Non tender, Normal Bowel Sounds  Musculoskeletal: No clubbing, No joint deformity   Neurologic: Non-focal  Lymphatic: No lymphadenopathy  Psychiatry: AAOx3, Mood & affect appropriate  Skin: No rashes, No ecchymoses, No cyanosis        137  |  103  |  44<H>  ----------------------------<  247<H>  4.6   |  19<L>  |  2.11<H>    Ca    9.1      2019 03:03  Mg     2.0                   Lipid panel   Hgb A1c                         10.3   5.5   )-----------( 220      ( 2019 03:03 )             29.3           Lipid panel   Hgb A1c         ECG: SR 67 bpm    Echo: EF 30%    Cath: scheduled for     Imaging: Head CT negative    Interpretation of Telemetry:

## 2019-02-12 NOTE — CONSULT NOTE ADULT - SUBJECTIVE AND OBJECTIVE BOX
[facility] Cardiology Consult  _________________________    Patient is a 73y old  Male who presents with a chief complaint of     HPI:  74 yo  male with PMHx of CAD (prior angioplasty but no stents, 15 years ago), HTN, depression, DMT2, HLD who was brought to [facility] after syncopal episode on , serial CT head negative for acute intracranial pathology, no visible trauma, trop 0.137-->0.239-->0.160, -->249, CKMB 4.7--> 5.8, proBNP 1948, Echo () with EF 30%, moderate decreased LVSF, mild to moderate MV regurgitation, entire lateral wall, entire inferior wall, LAD distribution, and entire septum, chest xray negative.  Patient found to be in CKD stage IV, gfr 28, s.Cr. 2.3.  Patient started on aspirin, coreg 3.125mg bid, hydralizine 25 tid, isosorbide dinatrate 5mg tid.  Patient transferred to [facility]  for LHC.  Patient denies any discomfort at this time.     PCP Heather Sloan [phone]  Cardiologist will be Steven Alejandre    Patient denies any discomfort this morning. Feels fine. He is anxious to get Cardiac cath done with.       PAST MEDICAL & SURGICAL HISTORY:  CKD (chronic kidney disease), stage IV  DM2 (diabetes mellitus, type 2)  Hyperlipidemia  Hypertension  CAD (Coronary Artery Disease)  Depression  Fracture of Humerus      MEDICATIONS  (STANDING):  aspirin enteric coated 81 milliGRAM(s) Oral daily  atorvastatin 40 milliGRAM(s) Oral at bedtime  carvedilol 3.125 milliGRAM(s) Oral every 12 hours  dextrose 5%. 1000 milliLiter(s) (50 mL/Hr) IV Continuous <Continuous>  dextrose 50% Injectable 12.5 Gram(s) IV Push once  dextrose 50% Injectable 25 Gram(s) IV Push once  dextrose 50% Injectable 25 Gram(s) IV Push once  hydrALAZINE 25 milliGRAM(s) Oral every 8 hours  insulin glargine Injectable (LANTUS) 13 Unit(s) SubCutaneous at bedtime  insulin lispro (HumaLOG) corrective regimen sliding scale   SubCutaneous three times a day before meals  insulin lispro (HumaLOG) corrective regimen sliding scale   SubCutaneous at bedtime  isosorbide   dinitrate Tablet (ISORDIL) 5 milliGRAM(s) Oral three times a day  multivitamin/minerals 1 Tablet(s) Oral daily  PARoxetine 20 milliGRAM(s) Oral daily  sodium chloride 0.9%. 1000 milliLiter(s) (60 mL/Hr) IV Continuous <Continuous>  sodium chloride 0.9%. 1000 milliLiter(s) (60 mL/Hr) IV Continuous <Continuous>    MEDICATIONS  (PRN):  dextrose 40% Gel 15 Gram(s) Oral once PRN Blood Glucose LESS THAN 70 milliGRAM(s)/deciliter  glucagon  Injectable 1 milliGRAM(s) IntraMuscular once PRN Glucose LESS THAN 70 milligrams/deciliter      Allergies    PCN,SULFA (Unknown)  penicillins (Hives)    Intolerances        Social Histroy: Tobacco- , ETOH-, Illicit Drugs-    T(C): 36.8 (19 @ 05:22), Max: 37.2 (19 @ 21:25)  HR: 67 (19 @ 10:08) (67 - 85)  BP: 157/63 (19 @ 10:08) (129/55 - 169/75)  RR: 17 (19 @ 05:22) (16 - 18)  SpO2: 97% (19 @ 05:22) (95% - 98%)  I&O's Summary    2019 07:  -  2019 07:00  --------------------------------------------------------  IN: 660 mL / OUT: 0 mL / NET: 660 mL    2019 07:  -  2019 10:11  --------------------------------------------------------  IN: 240 mL / OUT: 0 mL / NET: 240 mL        Review of Systems:  Constitutional: [ ] Fever [ ] Chills [ ] Fatigue [ ] Weight change   HEENT: [ ] Blurred vision [ ] Eye Pain [ ] Headache [ ] Runny nose [ ] Sore Throat   Respiratory: [ ] Cough [ ] Wheezing [ ] Shortness of breath  Cardiovascular: [ ] Chest Pain [ ] Palpitations [ ] CHUNG [ ] PND [ ] Orthopnea  Gastrointestinal: [ ] Abdominal Pain [ ] Diarrhea [ ] Constipation [ ] Hemorrhoids [ ] Nausea [ ] Vomiting  Genitourinary: [ ] Nocturia [ ] Dysuria [ ] Incontinence  Extremities: [ ] Swelling [ ] Joint Pain  Neurologic: [ ] Focal deficit [ ] Paresthesias [x] Syncope  Lymphatic: [ ] Swelling [ ] Lymphadenopathy   Skin: [ ] Rash [ ] Ecchymoses [ ] Wounds [ ] Lesions  Psychiatry: [ ] Depression [ ] Suicidal/Homicidal Ideation [ ] Anxiety [ ] Sleep Disturbances  [x] 10 point review of systems is otherwise negative except as mentioned above            [ ]Unable to obtain    PHYSICAL EXAM:  GENERAL: Alert, NAD  NECK: Supple  CHEST/LUNG: Clear to auscultation bilaterally; No wheezes, rales, or rhonchi  HEART: S1 S2 normal, RRR,  No murmurs, rubs, or gallops  ABDOMEN: Soft, Nondistended  EXTREMITIES:  No LE edema.      LABS:                        10.3   5.5   )-----------( 220      ( 2019 03:03 )             29.3     02-12    137  |  103  |  44<H>  ----------------------------<  247<H>  4.6   |  19<L>  |  2.11<H>    Ca    9.1      2019 03:03  Mg     2.0     02-12                Urinalysis Basic - ( 2019 03:03 )    Color: Colorless / Appearance: Clear / S.013 / pH: x  Gluc: x / Ketone: Negative  / Bili: Negative / Urobili: Negative   Blood: x / Protein: 30 mg/dL / Nitrite: Negative   Leuk Esterase: Negative / RBC: 1 /hpf / WBC 0 /HPF   Sq Epi: x / Non Sq Epi: 0 /hpf / Bacteria: 0.0        MEDICATIONS  (STANDING):  aspirin enteric coated 81 milliGRAM(s) Oral daily  atorvastatin 40 milliGRAM(s) Oral at bedtime  carvedilol 3.125 milliGRAM(s) Oral every 12 hours  dextrose 5%. 1000 milliLiter(s) (50 mL/Hr) IV Continuous <Continuous>  dextrose 50% Injectable 12.5 Gram(s) IV Push once  dextrose 50% Injectable 25 Gram(s) IV Push once  dextrose 50% Injectable 25 Gram(s) IV Push once  hydrALAZINE 25 milliGRAM(s) Oral every 8 hours  insulin glargine Injectable (LANTUS) 13 Unit(s) SubCutaneous at bedtime  insulin lispro (HumaLOG) corrective regimen sliding scale   SubCutaneous three times a day before meals  insulin lispro (HumaLOG) corrective regimen sliding scale   SubCutaneous at bedtime  isosorbide   dinitrate Tablet (ISORDIL) 5 milliGRAM(s) Oral three times a day  multivitamin/minerals 1 Tablet(s) Oral daily  PARoxetine 20 milliGRAM(s) Oral daily  sodium chloride 0.9%. 1000 milliLiter(s) (60 mL/Hr) IV Continuous <Continuous>  sodium chloride 0.9%. 1000 milliLiter(s) (60 mL/Hr) IV Continuous <Continuous>    MEDICATIONS  (PRN):  dextrose 40% Gel 15 Gram(s) Oral once PRN Blood Glucose LESS THAN 70 milliGRAM(s)/deciliter  glucagon  Injectable 1 milliGRAM(s) IntraMuscular once PRN Glucose LESS THAN 70 milligrams/deciliter          RADIOLOGY & ADDITIONAL TESTS:    Cardiology testing:    Echo:   1. Mitral annular calcification, otherwise normal mitral  valve.  2. Calcified trileaflet aortic valve with decreased  opening.  3. Moderate global LV dysfunction with severe segmental  left ventricular systolic dysfunction. Akinesis and  atrophy/scarring of the base to mid inferolateral and  inferior walls.  4. Mild diastolic dysfunction (Stage I).  5. The right ventricle is not well visualized; grossly  normal right ventricular systolic function.    Telemetry: sinus 70s

## 2019-02-12 NOTE — CONSULT NOTE ADULT - PROBLEM SELECTOR RECOMMENDATION 3
-appears euovolemic  -Coreg, hydralazine, isordil  -Cardiac cath today.     Steven Jean D.O.  [phone]

## 2019-02-12 NOTE — PROGRESS NOTE ADULT - ASSESSMENT
74 yo  male with PMHx of CAD (prior angioplasty but no stents, 15 years ago), HTN, depression, DMT2, HLD with syncopal episode on 2/6 while being now for coronary angiogram but also with SHAY on ckd III    1- SHAY on CKD III  2- HTN   3- cardiomyopathy   cr stabilizing and slowly improving   ? received diuretics  at [facility] ? ATN due to syncope.   cont with hydralazine and coreg.   ivf hydration at 60 cc/hr 3 hours before and 6 hours after coronary angiogram   he will stand at risk for worsening renal function with coronary angiogram.   DM cont to hold metformin   d/w cards  team

## 2019-02-13 LAB
ANION GAP SERPL CALC-SCNC: 12 MMOL/L — SIGNIFICANT CHANGE UP (ref 5–17)
BUN SERPL-MCNC: 45 MG/DL — HIGH (ref 7–23)
CALCIUM SERPL-MCNC: 8.7 MG/DL — SIGNIFICANT CHANGE UP (ref 8.4–10.5)
CHLORIDE SERPL-SCNC: 104 MMOL/L — SIGNIFICANT CHANGE UP (ref 96–108)
CO2 SERPL-SCNC: 18 MMOL/L — LOW (ref 22–31)
CREAT SERPL-MCNC: 2.1 MG/DL — HIGH (ref 0.5–1.3)
GLUCOSE BLDC GLUCOMTR-MCNC: 239 MG/DL — HIGH (ref 70–99)
GLUCOSE BLDC GLUCOMTR-MCNC: 239 MG/DL — HIGH (ref 70–99)
GLUCOSE BLDC GLUCOMTR-MCNC: 276 MG/DL — HIGH (ref 70–99)
GLUCOSE BLDC GLUCOMTR-MCNC: 310 MG/DL — HIGH (ref 70–99)
GLUCOSE SERPL-MCNC: 227 MG/DL — HIGH (ref 70–99)
HCT VFR BLD CALC: 28.6 % — LOW (ref 39–50)
HGB BLD-MCNC: 9.8 G/DL — LOW (ref 13–17)
MAGNESIUM SERPL-MCNC: 2 MG/DL — SIGNIFICANT CHANGE UP (ref 1.6–2.6)
MCHC RBC-ENTMCNC: 30.8 PG — SIGNIFICANT CHANGE UP (ref 27–34)
MCHC RBC-ENTMCNC: 34.3 GM/DL — SIGNIFICANT CHANGE UP (ref 32–36)
MCV RBC AUTO: 89.8 FL — SIGNIFICANT CHANGE UP (ref 80–100)
PLATELET # BLD AUTO: 214 K/UL — SIGNIFICANT CHANGE UP (ref 150–400)
POTASSIUM SERPL-MCNC: 4.5 MMOL/L — SIGNIFICANT CHANGE UP (ref 3.5–5.3)
POTASSIUM SERPL-SCNC: 4.5 MMOL/L — SIGNIFICANT CHANGE UP (ref 3.5–5.3)
RBC # BLD: 3.18 M/UL — LOW (ref 4.2–5.8)
RBC # FLD: 13.3 % — SIGNIFICANT CHANGE UP (ref 10.3–14.5)
SODIUM SERPL-SCNC: 134 MMOL/L — LOW (ref 135–145)
WBC # BLD: 5.8 K/UL — SIGNIFICANT CHANGE UP (ref 3.8–10.5)
WBC # FLD AUTO: 5.8 K/UL — SIGNIFICANT CHANGE UP (ref 3.8–10.5)

## 2019-02-13 RX ORDER — INSULIN GLARGINE 100 [IU]/ML
16 INJECTION, SOLUTION SUBCUTANEOUS AT BEDTIME
Qty: 0 | Refills: 0 | Status: DISCONTINUED | OUTPATIENT
Start: 2019-02-13 | End: 2019-02-15

## 2019-02-13 RX ADMIN — Medication 20 MILLIGRAM(S): at 12:22

## 2019-02-13 RX ADMIN — Medication 2: at 17:34

## 2019-02-13 RX ADMIN — ISOSORBIDE DINITRATE 5 MILLIGRAM(S): 5 TABLET ORAL at 13:18

## 2019-02-13 RX ADMIN — Medication 25 MILLIGRAM(S): at 21:27

## 2019-02-13 RX ADMIN — ISOSORBIDE DINITRATE 5 MILLIGRAM(S): 5 TABLET ORAL at 21:27

## 2019-02-13 RX ADMIN — ISOSORBIDE DINITRATE 5 MILLIGRAM(S): 5 TABLET ORAL at 05:16

## 2019-02-13 RX ADMIN — CARVEDILOL PHOSPHATE 3.12 MILLIGRAM(S): 80 CAPSULE, EXTENDED RELEASE ORAL at 05:16

## 2019-02-13 RX ADMIN — Medication 81 MILLIGRAM(S): at 05:16

## 2019-02-13 RX ADMIN — Medication 25 MILLIGRAM(S): at 13:18

## 2019-02-13 RX ADMIN — Medication 25 MILLIGRAM(S): at 05:16

## 2019-02-13 RX ADMIN — Medication 1: at 21:27

## 2019-02-13 RX ADMIN — ATORVASTATIN CALCIUM 40 MILLIGRAM(S): 80 TABLET, FILM COATED ORAL at 21:27

## 2019-02-13 RX ADMIN — CARVEDILOL PHOSPHATE 3.12 MILLIGRAM(S): 80 CAPSULE, EXTENDED RELEASE ORAL at 17:00

## 2019-02-13 RX ADMIN — INSULIN GLARGINE 16 UNIT(S): 100 INJECTION, SOLUTION SUBCUTANEOUS at 21:28

## 2019-02-13 RX ADMIN — Medication 1 TABLET(S): at 13:18

## 2019-02-13 RX ADMIN — Medication 4: at 11:53

## 2019-02-13 RX ADMIN — Medication 2: at 07:58

## 2019-02-13 NOTE — PROGRESS NOTE ADULT - ASSESSMENT
74 yo  male with PMHx of CAD (prior angioplasty but no stents, 15 years ago), HTN, depression, DMT2, HLD who was brought to [facility] after syncopal episode on 2/6, found to have depressed LVEF and renal dysfunction.   s/p cardiac cath 2/12/19 showing severe triple vessel disease.

## 2019-02-13 NOTE — PROGRESS NOTE ADULT - ASSESSMENT
74 yo  male with PMHx of CAD (prior angioplasty but no stents, 15 years ago), HTN, depression, DMT2, HLD with syncopal episode on 2/6 while being now for coronary angiogram but also with SHAY on ckd III    1- SHAY on CKD III  2- HTN   3- cardiomyopathy   cr steady at this 2.1 level  ? received diuretics  at [facility] ? ATN due to syncope and decrease perfusion   cont with hydralazine and coreg.   ivf hydration at 60 cc/hr 3 hours before and 6 hours after coronary angiogram   he will stand at risk for worsening renal function with coronary angiogram.   DM  dc metformin given his creatinine   renal sono   d/w cards  team earlier

## 2019-02-13 NOTE — PROGRESS NOTE ADULT - PROBLEM SELECTOR PLAN 3
-  -S/p cardiac cath showing significant triple vessel disease.   -Viability study 2/13/19  -ASA 81 mg  -Lipitor 40 mg daily  -Coreg, hydralazine, isordil.    Steven Jean D.O.  [phone]

## 2019-02-13 NOTE — PROGRESS NOTE ADULT - SUBJECTIVE AND OBJECTIVE BOX
[facility] Cardiology Progress Note  _______________________________    Pt. seen and examined. No new cardiac-related complaints.  s/p cardiac cath 19 showing severe triple vessel disease. awaiting viability study today.     Telemetry -sinus 60s,pvcs    T(C): 36.8 (19 @ 05:13), Max: 36.9 (19 @ 21:18)  HR: 65 (19 @ 05:13) (60 - 87)  BP: 131/61 (19 @ 05:13) (118/57 - 157/63)  RR: 16 (19 @ 05:13) (16 - 18)  SpO2: 98% (19 @ 05:13) (95% - 100%)  I&O's Summary    2019 07:01  -  2019 07:00  --------------------------------------------------------  IN: 1140 mL / OUT: 0 mL / NET: 1140 mL        PHYSICAL EXAM:  GENERAL: Alert, NAD.  NECK: Supple  CHEST/LUNG: Clear to auscultation bilaterally; No wheezes, rales, or rhonchi.  HEART: S1 S2 normal, RRR; No murmurs, rubs, or gallops  ABDOMEN: Soft, Nondistended  EXTREMITIES:  No LE edema.      LABS:                        9.8    5.8   )-----------( 214      ( 2019 02:44 )             28.6         134<L>  |  104  |  45<H>  ----------------------------<  227<H>  4.5   |  18<L>  |  2.10<H>    Ca    8.7      2019 02:44  Mg     2.0                     Urinalysis Basic - ( 2019 03:03 )    Color: Colorless / Appearance: Clear / S.013 / pH: x  Gluc: x / Ketone: Negative  / Bili: Negative / Urobili: Negative   Blood: x / Protein: 30 mg/dL / Nitrite: Negative   Leuk Esterase: Negative / RBC: 1 /hpf / WBC 0 /HPF   Sq Epi: x / Non Sq Epi: 0 /hpf / Bacteria: 0.0        MEDICATIONS  (STANDING):  aspirin enteric coated 81 milliGRAM(s) Oral daily  atorvastatin 40 milliGRAM(s) Oral at bedtime  carvedilol 3.125 milliGRAM(s) Oral every 12 hours  dextrose 5%. 1000 milliLiter(s) (50 mL/Hr) IV Continuous <Continuous>  dextrose 50% Injectable 12.5 Gram(s) IV Push once  dextrose 50% Injectable 25 Gram(s) IV Push once  dextrose 50% Injectable 25 Gram(s) IV Push once  hydrALAZINE 25 milliGRAM(s) Oral every 8 hours  insulin glargine Injectable (LANTUS) 13 Unit(s) SubCutaneous at bedtime  insulin lispro (HumaLOG) corrective regimen sliding scale   SubCutaneous three times a day before meals  insulin lispro (HumaLOG) corrective regimen sliding scale   SubCutaneous at bedtime  isosorbide   dinitrate Tablet (ISORDIL) 5 milliGRAM(s) Oral three times a day  multivitamin/minerals 1 Tablet(s) Oral daily  PARoxetine 20 milliGRAM(s) Oral daily  sodium chloride 0.9%. 1000 milliLiter(s) (60 mL/Hr) IV Continuous <Continuous>    MEDICATIONS  (PRN):  dextrose 40% Gel 15 Gram(s) Oral once PRN Blood Glucose LESS THAN 70 milliGRAM(s)/deciliter  glucagon  Injectable 1 milliGRAM(s) IntraMuscular once PRN Glucose LESS THAN 70 milligrams/deciliter        RADIOLOGY & ADDITIONAL TESTS:

## 2019-02-13 NOTE — PROGRESS NOTE ADULT - SUBJECTIVE AND OBJECTIVE BOX
Chief Complaint: Patient is a 73y old  Male who presents with a chief complaint of syncope now asymptomatic    Active Meds  MEDICATIONS  (STANDING):  aspirin enteric coated 81 milliGRAM(s) Oral daily  atorvastatin 40 milliGRAM(s) Oral at bedtime  carvedilol 3.125 milliGRAM(s) Oral every 12 hours  dextrose 5%. 1000 milliLiter(s) (50 mL/Hr) IV Continuous <Continuous>  dextrose 50% Injectable 12.5 Gram(s) IV Push once  dextrose 50% Injectable 25 Gram(s) IV Push once  dextrose 50% Injectable 25 Gram(s) IV Push once  hydrALAZINE 25 milliGRAM(s) Oral every 8 hours  insulin glargine Injectable (LANTUS) 16 Unit(s) SubCutaneous at bedtime  insulin lispro (HumaLOG) corrective regimen sliding scale   SubCutaneous three times a day before meals  insulin lispro (HumaLOG) corrective regimen sliding scale   SubCutaneous at bedtime  isosorbide   dinitrate Tablet (ISORDIL) 5 milliGRAM(s) Oral three times a day  multivitamin/minerals 1 Tablet(s) Oral daily  PARoxetine 20 milliGRAM(s) Oral daily  sodium chloride 0.9%. 1000 milliLiter(s) (60 mL/Hr) IV Continuous <Continuous>    MEDICATIONS  (PRN):  dextrose 40% Gel 15 Gram(s) Oral once PRN Blood Glucose LESS THAN 70 milliGRAM(s)/deciliter  glucagon  Injectable 1 milliGRAM(s) IntraMuscular once PRN Glucose LESS THAN 70 milligrams/deciliter      Allergies  Allergies    PCN,SULFA (Unknown)  penicillins (Hives)    Intolerances        I/Os  I&O's Summary    12 Feb 2019 07:01  -  13 Feb 2019 07:00  --------------------------------------------------------  IN: 1140 mL / OUT: 0 mL / NET: 1140 mL    13 Feb 2019 07:01  -  13 Feb 2019 18:04  --------------------------------------------------------  IN: 520 mL / OUT: 0 mL / NET: 520 mL        Vital Signs  T(C): 36.7 (02-13-19 @ 14:23), Max: 36.9 (02-12-19 @ 21:18)  HR: 65 (02-13-19 @ 14:23) (65 - 65)  BP: 139/68 (02-13-19 @ 14:23) (131/61 - 141/59)  RR: 16 (02-13-19 @ 14:23) (16 - 16)  SpO2: 99% (02-13-19 @ 14:23) (98% - 99%)  Wt(kg): --    PHYSICAL EXAM:unchanged  General:   HEENT:   Lymph Nodes:  Neck:   Respiratory:   Cardiovascular:   Abdomen:   Extremities:   Skin:   Neurological:  Psychiatry:        [id number]    134<L>  |  104  |  45<H>  ----------------------------<  227<H>  4.5   |  18<L>  |  2.10<H>    Ca    8.7      13 Feb 2019 02:44  Mg     2.0     02-13                          9.8    5.8   )-----------( 214      ( 13 Feb 2019 02:44 )             28.6         Radiology:      cardiology:      Assessments  Syncope and collapse  Cardiomyopathy, unspecified type ef 32 % will discuss with cards ? future defib  CKD (chronic kidney disease), stage IV ordered renal sono creatinine stable  Coronary artery disease involving native coronary artery of native heart without angina pectoris await results viability study  Type 2 diabetes mellitus without complication, without long-term current use of insulin  Hyperlipidemia, unspecified hyperlipidemia type  Hypertension, unspecified type  CAD (Coronary Artery Disease)

## 2019-02-13 NOTE — PROGRESS NOTE ADULT - SUBJECTIVE AND OBJECTIVE BOX
Patient is a 73y old  Male who presents with a chief complaint of syncope now s/p diagnostic cardiac cath  Diag 90%, Cx 90% and RCA 99% via right radial artery access.         Allergies    PCN,SULFA (Unknown)  penicillins (Hives)    Intolerances        Medications:  aspirin enteric coated 81 milliGRAM(s) Oral daily  atorvastatin 40 milliGRAM(s) Oral at bedtime  carvedilol 3.125 milliGRAM(s) Oral every 12 hours  dextrose 40% Gel 15 Gram(s) Oral once PRN  dextrose 5%. 1000 milliLiter(s) IV Continuous <Continuous>  dextrose 50% Injectable 12.5 Gram(s) IV Push once  dextrose 50% Injectable 25 Gram(s) IV Push once  dextrose 50% Injectable 25 Gram(s) IV Push once  glucagon  Injectable 1 milliGRAM(s) IntraMuscular once PRN  hydrALAZINE 25 milliGRAM(s) Oral every 8 hours  insulin glargine Injectable (LANTUS) 13 Unit(s) SubCutaneous at bedtime  insulin lispro (HumaLOG) corrective regimen sliding scale   SubCutaneous three times a day before meals  insulin lispro (HumaLOG) corrective regimen sliding scale   SubCutaneous at bedtime  isosorbide   dinitrate Tablet (ISORDIL) 5 milliGRAM(s) Oral three times a day  multivitamin/minerals 1 Tablet(s) Oral daily  PARoxetine 20 milliGRAM(s) Oral daily  sodium chloride 0.9%. 1000 milliLiter(s) IV Continuous <Continuous>      Vitals:  T(C): 36.8 (02-13-19 @ 05:13), Max: 36.9 (02-12-19 @ 21:18)  HR: 65 (02-13-19 @ 05:13) (60 - 87)  BP: 131/61 (02-13-19 @ 05:13) (118/57 - 157/63)  BP(mean): --  RR: 16 (02-13-19 @ 05:13) (16 - 18)  SpO2: 98% (02-13-19 @ 05:13) (95% - 100%)  Wt(kg): --  Daily     Daily   I&O's Summary    11 Feb 2019 07:01  -  12 Feb 2019 07:00  --------------------------------------------------------  IN: 660 mL / OUT: 0 mL / NET: 660 mL    12 Feb 2019 07:01  -  13 Feb 2019 06:30  --------------------------------------------------------  IN: 1140 mL / OUT: 0 mL / NET: 1140 mL          Physical Exam:  Procedural Access Site: Right radial artery access. No hematoma, Non-tender to palpation, 2+ pulse, No bruit, No Ecchymosis  Psychiatry: AAOx3, Mood & affect appropriate  Skin: No rashes, No ecchymoses, No cyanosis    02-13    134<L>  |  104  |  45<H>  ----------------------------<  227<H>  4.5   |  18<L>  |  2.10<H>    Ca    8.7      13 Feb 2019 02:44  Mg     2.0     02-13              Lipid panel   Hgb A1c Hemoglobin A1C, Whole Blood: 7.3 % (02-12 @ 07:27)      Interpretation of Telemetry:

## 2019-02-13 NOTE — PROGRESS NOTE ADULT - SUBJECTIVE AND OBJECTIVE BOX
[facility]   [phone]  RENAL FOLLOW UP NOTE  --------------------------------------------------------------------------------  Chief Complaint:    24 hour events/subjective:    seen. had viability study after his angio.  had 30 cc iv contrast for coronary angio yesterday   seen earlier.     PAST HISTORY  --------------------------------------------------------------------------------  No significant changes to PMH, PSH, FHx, SHx, unless otherwise noted    ALLERGIES & MEDICATIONS  --------------------------------------------------------------------------------  Allergies    PCN,SULFA (Unknown)  penicillins (Hives)    Intolerances      Standing Inpatient Medications  aspirin enteric coated 81 milliGRAM(s) Oral daily  atorvastatin 40 milliGRAM(s) Oral at bedtime  carvedilol 3.125 milliGRAM(s) Oral every 12 hours  dextrose 5%. 1000 milliLiter(s) IV Continuous <Continuous>  dextrose 50% Injectable 12.5 Gram(s) IV Push once  dextrose 50% Injectable 25 Gram(s) IV Push once  dextrose 50% Injectable 25 Gram(s) IV Push once  hydrALAZINE 25 milliGRAM(s) Oral every 8 hours  insulin glargine Injectable (LANTUS) 16 Unit(s) SubCutaneous at bedtime  insulin lispro (HumaLOG) corrective regimen sliding scale   SubCutaneous three times a day before meals  insulin lispro (HumaLOG) corrective regimen sliding scale   SubCutaneous at bedtime  isosorbide   dinitrate Tablet (ISORDIL) 5 milliGRAM(s) Oral three times a day  multivitamin/minerals 1 Tablet(s) Oral daily  PARoxetine 20 milliGRAM(s) Oral daily  sodium chloride 0.9%. 1000 milliLiter(s) IV Continuous <Continuous>    PRN Inpatient Medications  dextrose 40% Gel 15 Gram(s) Oral once PRN  glucagon  Injectable 1 milliGRAM(s) IntraMuscular once PRN      REVIEW OF SYSTEMS  --------------------------------------------------------------------------------    Gen: denies  fevers/chills,  CVS: denies chest pain/palpitations  Resp: denies SOB/Cough  GI: Denies N/V/Abd pain  : Denies dysuria/oliguria/hematuria    All other systems were reviewed and are negative, except as noted.    VITALS/PHYSICAL EXAM  --------------------------------------------------------------------------------  T(C): 36.7 (02-13-19 @ 14:23), Max: 36.9 (02-12-19 @ 21:18)  HR: 62 (02-13-19 @ 17:00) (62 - 65)  BP: 154/68 (02-13-19 @ 17:00) (131/61 - 154/68)  RR: 16 (02-13-19 @ 14:23) (16 - 16)  SpO2: 99% (02-13-19 @ 14:23) (98% - 99%)  Wt(kg): --        02-12-19 @ 07:01  -  02-13-19 @ 07:00  --------------------------------------------------------  IN: 1140 mL / OUT: 0 mL / NET: 1140 mL    02-13-19 @ 07:01  -  02-13-19 @ 20:11  --------------------------------------------------------  IN: 520 mL / OUT: 0 mL / NET: 520 mL      Physical Exam:  	  Gen: Non toxic comfortable appearing   	no jvd   	Pulm: decrease bs   no rales or ronchi or wheezing  	CV: RRR, S1S2; no rub  	Abd: +BS, soft, nontender/nondistended  	: No suprapubic tenderness  	UE: Warm, no cyanosis  no clubbing,  no edema  	LE: Warm, no cyanosis  no clubbing, no edema    	    LABS/STUDIES  --------------------------------------------------------------------------------              9.8    5.8   >-----------<  214      [02-13-19 @ 02:44]              28.6     134  |  104  |  45  ----------------------------<  227      [02-13-19 @ 02:44]  4.5   |  18  |  2.10        Ca     8.7     [02-13-19 @ 02:44]      Mg     2.0     [02-13-19 @ 02:44]            Creatinine Trend:  SCr 2.10 [02-13 @ 02:44]  SCr 2.11 [02-12 @ 03:03]              Urinalysis - [02-12-19 @ 03:03]      Color Colorless / Appearance Clear / SG 1.013 / pH 6.0      Gluc 200 mg/dL / Ketone Negative  / Bili Negative / Urobili Negative       Blood Negative / Protein 30 mg/dL / Leuk Est Negative / Nitrite Negative      RBC 1 / WBC 0 / Hyaline 0 / Gran  / Sq Epi  / Non Sq Epi 0 / Bacteria 0.0      Iron 50, TIBC 269, %sat 19      [02-12-19 @ 05:27]  Ferritin 79      [02-12-19 @ 05:27]  HbA1c 7.3      [02-12-19 @ 07:27]  TSH 2.74      [02-12-19 @ 05:27]

## 2019-02-13 NOTE — PROGRESS NOTE ADULT - ASSESSMENT
Patient is a 73y old  Male who presents with a chief complaint of syncope now s/p diagnostic cardiac cath  Diag 90%, Cx 90% and RCA 99% via right radial artery access. Pt tolerated the procedure well, cardiac cath site benign. Post-procedure discharge instructions discussed and questions addressed

## 2019-02-13 NOTE — PROGRESS NOTE ADULT - PROBLEM SELECTOR PLAN 1
-  s/p cardiac cath 2/12/19 showing severe triple vessel disease.   -Viability study  -ASA 81 mg  -Lipitor 40 mg daily  -Coreg, hydralazine, isordil.  -May need CT surgery consultation if significantly viable.

## 2019-02-14 ENCOUNTER — TRANSCRIPTION ENCOUNTER (OUTPATIENT)
Age: 74
End: 2019-02-14

## 2019-02-14 LAB
ALBUMIN SERPL ELPH-MCNC: 4 G/DL — SIGNIFICANT CHANGE UP (ref 3.3–5)
ALP SERPL-CCNC: 100 U/L — SIGNIFICANT CHANGE UP (ref 40–120)
ALT FLD-CCNC: 21 U/L — SIGNIFICANT CHANGE UP (ref 10–45)
ANION GAP SERPL CALC-SCNC: 14 MMOL/L — SIGNIFICANT CHANGE UP (ref 5–17)
ANION GAP SERPL CALC-SCNC: 17 MMOL/L — SIGNIFICANT CHANGE UP (ref 5–17)
AST SERPL-CCNC: 21 U/L — SIGNIFICANT CHANGE UP (ref 10–40)
BILIRUB SERPL-MCNC: 0.2 MG/DL — SIGNIFICANT CHANGE UP (ref 0.2–1.2)
BUN SERPL-MCNC: 39 MG/DL — HIGH (ref 7–23)
BUN SERPL-MCNC: 40 MG/DL — HIGH (ref 7–23)
CALCIUM SERPL-MCNC: 9 MG/DL — SIGNIFICANT CHANGE UP (ref 8.4–10.5)
CALCIUM SERPL-MCNC: 9 MG/DL — SIGNIFICANT CHANGE UP (ref 8.4–10.5)
CHLORIDE SERPL-SCNC: 103 MMOL/L — SIGNIFICANT CHANGE UP (ref 96–108)
CHLORIDE SERPL-SCNC: 104 MMOL/L — SIGNIFICANT CHANGE UP (ref 96–108)
CO2 SERPL-SCNC: 18 MMOL/L — LOW (ref 22–31)
CO2 SERPL-SCNC: 19 MMOL/L — LOW (ref 22–31)
CREAT SERPL-MCNC: 1.97 MG/DL — HIGH (ref 0.5–1.3)
CREAT SERPL-MCNC: 2 MG/DL — HIGH (ref 0.5–1.3)
GLUCOSE BLDC GLUCOMTR-MCNC: 241 MG/DL — HIGH (ref 70–99)
GLUCOSE BLDC GLUCOMTR-MCNC: 250 MG/DL — HIGH (ref 70–99)
GLUCOSE BLDC GLUCOMTR-MCNC: 261 MG/DL — HIGH (ref 70–99)
GLUCOSE BLDC GLUCOMTR-MCNC: 286 MG/DL — HIGH (ref 70–99)
GLUCOSE SERPL-MCNC: 219 MG/DL — HIGH (ref 70–99)
GLUCOSE SERPL-MCNC: 229 MG/DL — HIGH (ref 70–99)
HCT VFR BLD CALC: 29.5 % — LOW (ref 39–50)
HGB BLD-MCNC: 10.1 G/DL — LOW (ref 13–17)
MAGNESIUM SERPL-MCNC: 1.9 MG/DL — SIGNIFICANT CHANGE UP (ref 1.6–2.6)
MCHC RBC-ENTMCNC: 30.6 PG — SIGNIFICANT CHANGE UP (ref 27–34)
MCHC RBC-ENTMCNC: 34.3 GM/DL — SIGNIFICANT CHANGE UP (ref 32–36)
MCV RBC AUTO: 89.2 FL — SIGNIFICANT CHANGE UP (ref 80–100)
PLATELET # BLD AUTO: 207 K/UL — SIGNIFICANT CHANGE UP (ref 150–400)
POTASSIUM SERPL-MCNC: 4.5 MMOL/L — SIGNIFICANT CHANGE UP (ref 3.5–5.3)
POTASSIUM SERPL-MCNC: 4.5 MMOL/L — SIGNIFICANT CHANGE UP (ref 3.5–5.3)
POTASSIUM SERPL-SCNC: 4.5 MMOL/L — SIGNIFICANT CHANGE UP (ref 3.5–5.3)
POTASSIUM SERPL-SCNC: 4.5 MMOL/L — SIGNIFICANT CHANGE UP (ref 3.5–5.3)
PROT SERPL-MCNC: 6.6 G/DL — SIGNIFICANT CHANGE UP (ref 6–8.3)
RBC # BLD: 3.3 M/UL — LOW (ref 4.2–5.8)
RBC # FLD: 13.2 % — SIGNIFICANT CHANGE UP (ref 10.3–14.5)
SODIUM SERPL-SCNC: 136 MMOL/L — SIGNIFICANT CHANGE UP (ref 135–145)
SODIUM SERPL-SCNC: 139 MMOL/L — SIGNIFICANT CHANGE UP (ref 135–145)
WBC # BLD: 6 K/UL — SIGNIFICANT CHANGE UP (ref 3.8–10.5)
WBC # FLD AUTO: 6 K/UL — SIGNIFICANT CHANGE UP (ref 3.8–10.5)

## 2019-02-14 PROCEDURE — 93010 ELECTROCARDIOGRAM REPORT: CPT

## 2019-02-14 PROCEDURE — 76937 US GUIDE VASCULAR ACCESS: CPT | Mod: 26,GC

## 2019-02-14 PROCEDURE — 78452 HT MUSCLE IMAGE SPECT MULT: CPT | Mod: 26

## 2019-02-14 PROCEDURE — 92933 PRQ TRLML C ATHRC ST ANGIOP1: CPT | Mod: LC,GC

## 2019-02-14 PROCEDURE — 76775 US EXAM ABDO BACK WALL LIM: CPT | Mod: 26

## 2019-02-14 PROCEDURE — 99152 MOD SED SAME PHYS/QHP 5/>YRS: CPT | Mod: GC

## 2019-02-14 RX ORDER — CLOPIDOGREL BISULFATE 75 MG/1
75 TABLET, FILM COATED ORAL DAILY
Qty: 0 | Refills: 0 | Status: DISCONTINUED | OUTPATIENT
Start: 2019-02-14 | End: 2019-02-15

## 2019-02-14 RX ORDER — SODIUM CHLORIDE 9 MG/ML
1000 INJECTION INTRAMUSCULAR; INTRAVENOUS; SUBCUTANEOUS
Qty: 0 | Refills: 0 | Status: COMPLETED | OUTPATIENT
Start: 2019-02-14 | End: 2019-02-14

## 2019-02-14 RX ORDER — SODIUM CHLORIDE 9 MG/ML
1000 INJECTION INTRAMUSCULAR; INTRAVENOUS; SUBCUTANEOUS
Qty: 0 | Refills: 0 | Status: DISCONTINUED | OUTPATIENT
Start: 2019-02-14 | End: 2019-02-15

## 2019-02-14 RX ORDER — INSULIN LISPRO 100/ML
3 VIAL (ML) SUBCUTANEOUS
Qty: 0 | Refills: 0 | Status: DISCONTINUED | OUTPATIENT
Start: 2019-02-14 | End: 2019-02-15

## 2019-02-14 RX ORDER — ZALEPLON 10 MG
5 CAPSULE ORAL AT BEDTIME
Qty: 0 | Refills: 0 | Status: DISCONTINUED | OUTPATIENT
Start: 2019-02-14 | End: 2019-02-15

## 2019-02-14 RX ADMIN — ISOSORBIDE DINITRATE 5 MILLIGRAM(S): 5 TABLET ORAL at 05:32

## 2019-02-14 RX ADMIN — Medication 25 MILLIGRAM(S): at 12:33

## 2019-02-14 RX ADMIN — Medication 3 UNIT(S): at 17:51

## 2019-02-14 RX ADMIN — Medication 25 MILLIGRAM(S): at 05:32

## 2019-02-14 RX ADMIN — Medication 2: at 17:51

## 2019-02-14 RX ADMIN — Medication 5 MILLIGRAM(S): at 22:05

## 2019-02-14 RX ADMIN — CARVEDILOL PHOSPHATE 3.12 MILLIGRAM(S): 80 CAPSULE, EXTENDED RELEASE ORAL at 17:46

## 2019-02-14 RX ADMIN — SODIUM CHLORIDE 60 MILLILITER(S): 9 INJECTION INTRAMUSCULAR; INTRAVENOUS; SUBCUTANEOUS at 10:33

## 2019-02-14 RX ADMIN — ISOSORBIDE DINITRATE 5 MILLIGRAM(S): 5 TABLET ORAL at 22:05

## 2019-02-14 RX ADMIN — Medication 25 MILLIGRAM(S): at 22:05

## 2019-02-14 RX ADMIN — INSULIN GLARGINE 16 UNIT(S): 100 INJECTION, SOLUTION SUBCUTANEOUS at 22:06

## 2019-02-14 RX ADMIN — Medication 1: at 22:05

## 2019-02-14 RX ADMIN — CARVEDILOL PHOSPHATE 3.12 MILLIGRAM(S): 80 CAPSULE, EXTENDED RELEASE ORAL at 05:32

## 2019-02-14 RX ADMIN — ATORVASTATIN CALCIUM 40 MILLIGRAM(S): 80 TABLET, FILM COATED ORAL at 22:05

## 2019-02-14 RX ADMIN — SODIUM CHLORIDE 60 MILLILITER(S): 9 INJECTION INTRAMUSCULAR; INTRAVENOUS; SUBCUTANEOUS at 17:46

## 2019-02-14 RX ADMIN — Medication 20 MILLIGRAM(S): at 12:33

## 2019-02-14 RX ADMIN — Medication 2: at 07:43

## 2019-02-14 RX ADMIN — Medication 81 MILLIGRAM(S): at 05:32

## 2019-02-14 RX ADMIN — ISOSORBIDE DINITRATE 5 MILLIGRAM(S): 5 TABLET ORAL at 12:33

## 2019-02-14 RX ADMIN — Medication 1 TABLET(S): at 12:33

## 2019-02-14 RX ADMIN — Medication 3: at 11:45

## 2019-02-14 NOTE — PROGRESS NOTE ADULT - ASSESSMENT
74 yo  male with PMHx of CAD (prior angioplasty but no stents, 15 years ago), HTN, depression, DMT2, HLD who was brought to [facility] after syncopal episode on 2/6, found to have depressed LVEF and renal dysfunction.   s/p cardiac cath 2/12/19 showing severe triple vessel disease.

## 2019-02-14 NOTE — DISCHARGE NOTE ADULT - CARE PLAN
Principal Discharge DX:	Coronary artery disease involving native coronary artery of native heart without angina pectoris  Goal:	Patient remains chest pain free and understands post cath discharge instructions.  Assessment and plan of treatment:	Do not stop your aspirin or Plavix unless instructed to do so by your cardiologist, they help keep your stented arteries open.   No heavy lifting, strenuous activity, bending, straining, or unnecessary stair climbing for 2 weeks. No driving for 2 days. You may shower 24 hours following the procedure but avoid baths/swimming for 1 week. Check your groin site for bleeding and/or swelling daily following procedure and call your doctor immediately if it occurs or if you experience increased pain at the site. Follow up with your cardiologist in 1-2 weeks. You may call [facility] if you have any questions/concerns regarding your procedure [phone].  Secondary Diagnosis:	Hypertension, unspecified type  Goal:	Your blood pressure will be controlled.  Assessment and plan of treatment:	Continue with your blood pressure medications; eat a heart healthy diet with low salt diet; exercise regularly (consult with your physician or cardiologist first); maintain a heart healthy weight; if you smoke - quit (A resource to help you stop smoking is the [facility] [email or web address] – phone number [phone].); include healthy ways to manage stress. Continue to follow with your primary care physician or cardiologist.  Secondary Diagnosis:	Hyperlipidemia, unspecified hyperlipidemia type  Goal:	Your LDL cholesterol will be less than 70mg/dL  Assessment and plan of treatment:	Continue with your cholesterol medications. Eat a heart healthy diet that is low in saturated fats and salt, and includes whole grains, fruits, vegetables and lean protein; exercise regularly (consult with your physician or cardiologist first); maintain a heart healthy weight; if you smoke - quit (A resource to help you stop smoking is the [facility] [employer] for Tobacco Control – phone number [phone].). Continue to follow with your primary physician or cardiologist.  Secondary Diagnosis:	Type 2 diabetes mellitus without complication, without long-term current use of insulin  Goal:	Your hemoglobin A1C will be between 7-8  Assessment and plan of treatment:	Your Hemoglobin A1c level is 7.3 .  Continue to follow with your primary care MD or your endocrinologist.  Follow a heart healthy diabetic diet. If you check your fingerstick glucose at home, call your MD if it is greater than 250mg/dL on 2 occasions or less than 100mg/dL on 2 occasions. Know signs of low blood sugar, such as: dizziness, shakiness, sweating, confusion, hunger, nervousness-drink 4 ounces apple juice if occurs and call your doctor. Know early signs of high blood sugar, such as: frequent urination, increased thirst, blurry vision, fatigue, headache - call your doctor if this occurs. Follow with other practitioners to care for your diabetes, such as ophthalmologist and podiatrist.

## 2019-02-14 NOTE — PROGRESS NOTE ADULT - SUBJECTIVE AND OBJECTIVE BOX
[facility]   [phone]  RENAL FOLLOW UP NOTE  --------------------------------------------------------------------------------  Chief Complaint:    24 hour events/subjective:    seen earlier.  states has no new c/o     PAST HISTORY  --------------------------------------------------------------------------------  No significant changes to PMH, PSH, FHx, SHx, unless otherwise noted    ALLERGIES & MEDICATIONS  --------------------------------------------------------------------------------  Allergies    PCN,SULFA (Unknown)  penicillins (Hives)    Intolerances      Standing Inpatient Medications  aspirin enteric coated 81 milliGRAM(s) Oral daily  atorvastatin 40 milliGRAM(s) Oral at bedtime  carvedilol 3.125 milliGRAM(s) Oral every 12 hours  clopidogrel Tablet 75 milliGRAM(s) Oral daily  dextrose 5%. 1000 milliLiter(s) IV Continuous <Continuous>  dextrose 50% Injectable 12.5 Gram(s) IV Push once  dextrose 50% Injectable 25 Gram(s) IV Push once  dextrose 50% Injectable 25 Gram(s) IV Push once  hydrALAZINE 25 milliGRAM(s) Oral every 8 hours  insulin glargine Injectable (LANTUS) 16 Unit(s) SubCutaneous at bedtime  insulin lispro (HumaLOG) corrective regimen sliding scale   SubCutaneous three times a day before meals  insulin lispro (HumaLOG) corrective regimen sliding scale   SubCutaneous at bedtime  insulin lispro Injectable (HumaLOG) 3 Unit(s) SubCutaneous three times a day before meals  isosorbide   dinitrate Tablet (ISORDIL) 5 milliGRAM(s) Oral three times a day  multivitamin/minerals 1 Tablet(s) Oral daily  PARoxetine 20 milliGRAM(s) Oral daily  sodium chloride 0.9%. 1000 milliLiter(s) IV Continuous <Continuous>  sodium chloride 0.9%. 1000 milliLiter(s) IV Continuous <Continuous>    PRN Inpatient Medications  dextrose 40% Gel 15 Gram(s) Oral once PRN  glucagon  Injectable 1 milliGRAM(s) IntraMuscular once PRN      REVIEW OF SYSTEMS  --------------------------------------------------------------------------------    Gen: denies fatigue, fevers/chills,  CVS: denies chest pain/palpitations  Resp: denies SOB/Cough  GI: Denies N/V/Abd pain  : Denies dysuria/oliguria/hematuria    All other systems were reviewed and are negative, except as noted.    VITALS/PHYSICAL EXAM  --------------------------------------------------------------------------------  T(C): 36.8 (02-14-19 @ 20:15), Max: 36.8 (02-13-19 @ 21:13)  HR: 65 (02-14-19 @ 20:15) (62 - 69)  BP: 136/65 (02-14-19 @ 20:15) (127/55 - 160/77)  RR: 16 (02-14-19 @ 20:15) (16 - 17)  SpO2: 98% (02-14-19 @ 20:15) (96% - 100%)  Wt(kg): --        02-13-19 @ 07:01  -  02-14-19 @ 07:00  --------------------------------------------------------  IN: 700 mL / OUT: 0 mL / NET: 700 mL    02-14-19 @ 07:01  -  02-14-19 @ 21:12  --------------------------------------------------------  IN: 1280 mL / OUT: 0 mL / NET: 1280 mL      Physical Exam:  	  Gen: Non toxic comfortable appearing   	no jvd   	Pulm: decrease bs   no rales or ronchi or wheezing  	CV: RRR, S1S2; no rub  	Abd: +BS, soft, nontender/nondistended  	: No suprapubic tenderness  	UE: Warm, no cyanosis  no clubbing,  no edema  	LE: Warm, no cyanosis  no clubbing, no edema	      LABS/STUDIES  --------------------------------------------------------------------------------              10.1   6.0   >-----------<  207      [02-14-19 @ 02:21]              29.5     139  |  103  |  39  ----------------------------<  219      [02-14-19 @ 03:23]  4.5   |  19  |  1.97        Ca     9.0     [02-14-19 @ 03:23]      Mg     1.9     [02-14-19 @ 02:21]    TPro  6.6  /  Alb  4.0  /  TBili  0.2  /  DBili  x   /  AST  21  /  ALT  21  /  AlkPhos  100  [02-14-19 @ 02:21]          Creatinine Trend:  SCr 1.97 [02-14 @ 03:23]  SCr 2.00 [02-14 @ 02:21]  SCr 2.10 [02-13 @ 02:44]  SCr 2.11 [02-12 @ 03:03]              Urinalysis - [02-12-19 @ 03:03]      Color Colorless / Appearance Clear / SG 1.013 / pH 6.0      Gluc 200 mg/dL / Ketone Negative  / Bili Negative / Urobili Negative       Blood Negative / Protein 30 mg/dL / Leuk Est Negative / Nitrite Negative      RBC 1 / WBC 0 / Hyaline 0 / Gran  / Sq Epi  / Non Sq Epi 0 / Bacteria 0.0      Iron 50, TIBC 269, %sat 19      [02-12-19 @ 05:27]  Ferritin 79      [02-12-19 @ 05:27]  HbA1c 7.3      [02-12-19 @ 07:27]  TSH 2.74      [02-12-19 @ 05:27]    < from: US Renal (02.14.19 @ 11:08) >  EXAM:  US KIDNEY(S)                            PROCEDURE DATE:  02/14/2019            INTERPRETATION:  CLINICAL INFORMATION: Elevated creatinine. Diabetes.    COMPARISON: None available.    TECHNIQUE: Sonography of the kidneys and bladder.     FINDINGS:    Right kidney:  11.9 cm. No hydronephrosis. Increased cortical   echogenicity. A small complex cyst with associated calcification is noted   in the upper pole, measuring 0.9 cm.    Left kidney:  12.5 cm. No hydronephrosis. Increased cortical echogenicity.    Urinary bladder: Within normal limits.    IMPRESSION:     No hydronephrosis.    Increased renal cortical echogenicity bilaterally, which may be secondary   to medical renal disease.    A small complex cyst with associated calcificationis noted in the upper   pole of the right kidney.                        JE GUIDO M.D., ATTENDING RADIOLOGIST  This document has been electronically signed. Feb 14 2019 11:09AM              < end of copied text >

## 2019-02-14 NOTE — DISCHARGE NOTE ADULT - ADDITIONAL INSTRUCTIONS
Follow up with your cardiologist and primary care provider in 1-2 weeks. Follow-up with Dr. Jean in one week

## 2019-02-14 NOTE — DISCHARGE NOTE ADULT - MEDICATION SUMMARY - MEDICATIONS TO TAKE
I will START or STAY ON the medications listed below when I get home from the hospital:    Aspirin Enteric Coated 81 mg oral delayed release tablet  -- 1 tab(s) by mouth once a day  hospital  -- Indication: For keep stents open    isosorbide dinitrate 5 mg oral tablet  -- 1 tab(s) by mouth 3 times a day MDD:3  -- Indication: For High blood pressure/ coronary artery disease    PARoxetine 20 mg oral tablet  -- 1 tab(s) by mouth once a day  home and hospital  -- Indication: For Depression    glipiZIDE 10 mg oral tablet, extended release  -- 1 tab(s) by mouth once a day  home  -- Indication: For Diabetes    Lantus Solostar Pen 100 units/mL subcutaneous solution  -- 16 unit(s) subcutaneous once a day (at bedtime)  home  -- Indication: For Diabetes    pioglitazone 30 mg oral tablet  -- 1 tab(s) by mouth once a day  home  -- Indication: For Diabetes    Allegra 24 Hour Allergy oral tablet  -- 1 tab(s) by mouth once a day  home  -- Indication: For allergies    atorvastatin 40 mg oral tablet  -- 1 tab(s) by mouth once a day (at bedtime) MDD:1  -- Indication: For High cholesterol    clopidogrel 75 mg oral tablet  -- 1 tab(s) by mouth once a day MDD:1  -- Indication: For keep stents open    carvedilol 3.125 mg oral tablet  -- 1 tab(s) by mouth every 12 hours MDD:2  -- Indication: For High blood pressure    hydrALAZINE 25 mg oral tablet  -- 1 tab(s) by mouth every 8 hours MDD:3  -- Indication: For High blood pressure    Centrum Silver Men's oral tablet  -- 1 tab(s) by mouth once a day  home  -- Indication: For multivitamin

## 2019-02-14 NOTE — DISCHARGE NOTE ADULT - CARE PROVIDER_API CALL
Nohemy Sandra)  Internal Medicine  [address] [place] [address]  Phone: [phone]  Fax: [phone]  Follow Up Time: Steven Jean ()  Internal Medicine  [address] [facility], [address]  [place] [address]  Phone: [phone]  Follow Up Time:

## 2019-02-14 NOTE — PROGRESS NOTE ADULT - ASSESSMENT
72 yo  male with PMHx of CAD (prior angioplasty but no stents, 15 years ago), HTN, depression, DMT2, HLD with syncopal episode on 2/6 while being now for coronary angiogram but also with SHAY on ckd III    1- SHAY on CKD III  2- HTN   3- cardiomyopathy   cr steady at this  level   ATN due to syncope and decrease perfusion   cont with hydralazine and coreg.   ivf hydration at 60 cc/hr 3 hours before and 6 hours after coronary angiogram   complex renal cyst work up once cardiac work up completed and to see el outpt

## 2019-02-14 NOTE — DISCHARGE NOTE ADULT - PRINCIPAL DIAGNOSIS
Problem: Patient/Family Goals  Goal: Patient/Family Long Term Goal  Patient's Long Term Goal: To be discharged home    Interventions:  - monitor vs,test results  -administer meds  -follow md orders/poc    - See additional Care Plan goals for specific inter infection  INTERVENTIONS:  - Assess and document risk factors for pressure ulcer development  - Assess and document skin integrity  - Assess and document dressing/incision, wound bed, drain sites and surrounding tissue  - Implement wound care per orders  - decision-making at the level they choose  - Honor patient and family perspectives and choices  Outcome: Progressing  Admit questions were answered by granddaughter, Jerrie Sever who stated that she is the pt's caregiver at home. Coronary artery disease involving native coronary artery of native heart without angina pectoris

## 2019-02-14 NOTE — PROGRESS NOTE ADULT - PROBLEM SELECTOR PLAN 3
-  -S/p cardiac cath showing significant triple vessel disease.   -Viability study 2/13/19  -ASA 81 mg  -Lipitor 40 mg daily  -Coreg, hydralazine, isordil.    Steven Jean D.O.  [phone]

## 2019-02-14 NOTE — DISCHARGE NOTE ADULT - PLAN OF CARE
Patient remains chest pain free and understands post cath discharge instructions. Do not stop your aspirin or Plavix unless instructed to do so by your cardiologist, they help keep your stented arteries open.   No heavy lifting, strenuous activity, bending, straining, or unnecessary stair climbing for 2 weeks. No driving for 2 days. You may shower 24 hours following the procedure but avoid baths/swimming for 1 week. Check your groin site for bleeding and/or swelling daily following procedure and call your doctor immediately if it occurs or if you experience increased pain at the site. Follow up with your cardiologist in 1-2 weeks. You may call [facility] if you have any questions/concerns regarding your procedure [phone]. Your blood pressure will be controlled. Continue with your blood pressure medications; eat a heart healthy diet with low salt diet; exercise regularly (consult with your physician or cardiologist first); maintain a heart healthy weight; if you smoke - quit (A resource to help you stop smoking is the [facility] for Tobacco Control – phone number [phone].); include healthy ways to manage stress. Continue to follow with your primary care physician or cardiologist. Your LDL cholesterol will be less than 70mg/dL Continue with your cholesterol medications. Eat a heart healthy diet that is low in saturated fats and salt, and includes whole grains, fruits, vegetables and lean protein; exercise regularly (consult with your physician or cardiologist first); maintain a heart healthy weight; if you smoke - quit (A resource to help you stop smoking is the [facility] for Tobacco Control – phone number [phone].). Continue to follow with your primary physician or cardiologist. Your hemoglobin A1C will be between 7-8 Your Hemoglobin A1c level is 7.3 .  Continue to follow with your primary care MD or your endocrinologist.  Follow a heart healthy diabetic diet. If you check your fingerstick glucose at home, call your MD if it is greater than 250mg/dL on 2 occasions or less than 100mg/dL on 2 occasions. Know signs of low blood sugar, such as: dizziness, shakiness, sweating, confusion, hunger, nervousness-drink 4 ounces apple juice if occurs and call your doctor. Know early signs of high blood sugar, such as: frequent urination, increased thirst, blurry vision, fatigue, headache - call your doctor if this occurs. Follow with other practitioners to care for your diabetes, such as ophthalmologist and podiatrist.

## 2019-02-14 NOTE — PROGRESS NOTE ADULT - SUBJECTIVE AND OBJECTIVE BOX
[facility] Progress Note  _______________________________    Pt. seen and examined. No new cardiac-related complaints.    Telemetry -sinus 70, pvcs    T(C): 36.6 (02-14-19 @ 05:30), Max: 36.8 (02-13-19 @ 21:13)  HR: 68 (02-14-19 @ 05:30) (62 - 68)  BP: 136/63 (02-14-19 @ 05:30) (136/63 - 154/86)  RR: 17 (02-14-19 @ 05:30) (16 - 17)  SpO2: 97% (02-14-19 @ 05:30) (96% - 99%)  I&O's Summary    13 Feb 2019 07:01  -  14 Feb 2019 07:00  --------------------------------------------------------  IN: 700 mL / OUT: 0 mL / NET: 700 mL        PHYSICAL EXAM:  GENERAL: Alert, NAD.  NECK: Supple  CHEST/LUNG: Clear to auscultation bilaterally; No wheezes, rales, or rhonchi.  HEART: S1 S2 normal, RRR; No murmurs, rubs, or gallops  ABDOMEN: Soft, Nondistended  EXTREMITIES:  No LE edema.      LABS:                        10.1   6.0   )-----------( 207      ( 14 Feb 2019 02:21 )             29.5     02-14    139  |  103  |  39<H>  ----------------------------<  219<H>  4.5   |  19<L>  |  1.97<H>    Ca    9.0      14 Feb 2019 03:23  Mg     1.9     02-14    TPro  6.6  /  Alb  4.0  /  TBili  0.2  /  DBili  x   /  AST  21  /  ALT  21  /  AlkPhos  100  02-14                  MEDICATIONS  (STANDING):  aspirin enteric coated 81 milliGRAM(s) Oral daily  atorvastatin 40 milliGRAM(s) Oral at bedtime  carvedilol 3.125 milliGRAM(s) Oral every 12 hours  dextrose 5%. 1000 milliLiter(s) (50 mL/Hr) IV Continuous <Continuous>  dextrose 50% Injectable 12.5 Gram(s) IV Push once  dextrose 50% Injectable 25 Gram(s) IV Push once  dextrose 50% Injectable 25 Gram(s) IV Push once  hydrALAZINE 25 milliGRAM(s) Oral every 8 hours  insulin glargine Injectable (LANTUS) 16 Unit(s) SubCutaneous at bedtime  insulin lispro (HumaLOG) corrective regimen sliding scale   SubCutaneous three times a day before meals  insulin lispro (HumaLOG) corrective regimen sliding scale   SubCutaneous at bedtime  isosorbide   dinitrate Tablet (ISORDIL) 5 milliGRAM(s) Oral three times a day  multivitamin/minerals 1 Tablet(s) Oral daily  PARoxetine 20 milliGRAM(s) Oral daily  sodium chloride 0.9%. 1000 milliLiter(s) (60 mL/Hr) IV Continuous <Continuous>    MEDICATIONS  (PRN):  dextrose 40% Gel 15 Gram(s) Oral once PRN Blood Glucose LESS THAN 70 milliGRAM(s)/deciliter  glucagon  Injectable 1 milliGRAM(s) IntraMuscular once PRN Glucose LESS THAN 70 milligrams/deciliter        RADIOLOGY & ADDITIONAL TESTS:

## 2019-02-14 NOTE — PROGRESS NOTE ADULT - SUBJECTIVE AND OBJECTIVE BOX
Patient is a 73y old  Male who presents with a chief complaint of CAD (13 Feb 2019 06:30)      SUBJECTIVE / OVERNIGHT EVENTS:  Pt seen and examined at bedside in [facility].   No overnight event.  no cp, no sob, no n/v/d.   the wife at bedside.       Vital Signs Last 24 Hrs  T(C): 36.7 (14 Feb 2019 13:15), Max: 36.8 (13 Feb 2019 21:13)  T(F): 98 (14 Feb 2019 13:15), Max: 98.3 (13 Feb 2019 21:13)  HR: 65 (14 Feb 2019 16:30) (62 - 68)  BP: 141/64 (14 Feb 2019 16:30) (127/55 - 154/86)  BP(mean): --  RR: 16 (14 Feb 2019 16:30) (16 - 17)  SpO2: 100% (14 Feb 2019 16:30) (96% - 100%)  I&O's Summary    13 Feb 2019 07:01  -  14 Feb 2019 07:00  --------------------------------------------------------  IN: 700 mL / OUT: 0 mL / NET: 700 mL    14 Feb 2019 07:01  -  14 Feb 2019 16:52  --------------------------------------------------------  IN: 520 mL / OUT: 0 mL / NET: 520 mL        PHYSICAL EXAM:  GENERAL: NAD, Comfortable  HEAD:  Atraumatic, Normocephalic  EYES: EOMI, PERRLA, conjunctiva and sclera clear  NECK: Supple, No JVD  CHEST/LUNG: Clear to auscultation bilaterally; No wheeze  HEART: Regular rate and rhythm; No murmurs, rubs, or gallops  ABDOMEN: Soft, Nontender, Nondistended; Bowel sounds present  Neuro: AAO x 3, no focal deficit, 5/5 b/l extremities  EXTREMITIES:  2+ Peripheral Pulses, No clubbing, cyanosis, or edema  SKIN: No rashes or lesions    LABS:                        10.1   6.0   )-----------( 207      ( 14 Feb 2019 02:21 )             29.5     02-14    139  |  103  |  39<H>  ----------------------------<  219<H>  4.5   |  19<L>  |  1.97<H>    Ca    9.0      14 Feb 2019 03:23  Mg     1.9     02-14    TPro  6.6  /  Alb  4.0  /  TBili  0.2  /  DBili  x   /  AST  21  /  ALT  21  /  AlkPhos  100  02-14      CAPILLARY BLOOD GLUCOSE      POCT Blood Glucose.: 286 mg/dL (14 Feb 2019 11:41)  POCT Blood Glucose.: 250 mg/dL (14 Feb 2019 07:25)  POCT Blood Glucose.: 276 mg/dL (13 Feb 2019 21:16)  POCT Blood Glucose.: 239 mg/dL (13 Feb 2019 17:20)            RADIOLOGY & ADDITIONAL TESTS:    Imaging Personally Reviewed:  [x] YES  [ ] NO    Consultant(s) Notes Reviewed:  [x] YES  [ ] NO      MEDICATIONS  (STANDING):  aspirin enteric coated 81 milliGRAM(s) Oral daily  atorvastatin 40 milliGRAM(s) Oral at bedtime  carvedilol 3.125 milliGRAM(s) Oral every 12 hours  dextrose 5%. 1000 milliLiter(s) (50 mL/Hr) IV Continuous <Continuous>  dextrose 50% Injectable 12.5 Gram(s) IV Push once  dextrose 50% Injectable 25 Gram(s) IV Push once  dextrose 50% Injectable 25 Gram(s) IV Push once  hydrALAZINE 25 milliGRAM(s) Oral every 8 hours  insulin glargine Injectable (LANTUS) 16 Unit(s) SubCutaneous at bedtime  insulin lispro (HumaLOG) corrective regimen sliding scale   SubCutaneous three times a day before meals  insulin lispro (HumaLOG) corrective regimen sliding scale   SubCutaneous at bedtime  isosorbide   dinitrate Tablet (ISORDIL) 5 milliGRAM(s) Oral three times a day  multivitamin/minerals 1 Tablet(s) Oral daily  PARoxetine 20 milliGRAM(s) Oral daily  sodium chloride 0.9%. 1000 milliLiter(s) (60 mL/Hr) IV Continuous <Continuous>  sodium chloride 0.9%. 1000 milliLiter(s) (60 mL/Hr) IV Continuous <Continuous>  sodium chloride 0.9%. 1000 milliLiter(s) (60 mL/Hr) IV Continuous <Continuous>    MEDICATIONS  (PRN):  dextrose 40% Gel 15 Gram(s) Oral once PRN Blood Glucose LESS THAN 70 milliGRAM(s)/deciliter  glucagon  Injectable 1 milliGRAM(s) IntraMuscular once PRN Glucose LESS THAN 70 milligrams/deciliter      Care Discussed with Consultants/Other Providers [x] YES  [ ] NO    HEALTH ISSUES - PROBLEM Dx:  Cardiomyopathy, unspecified type: Cardiomyopathy, unspecified type  CKD (chronic kidney disease), stage IV: CKD (chronic kidney disease), stage IV  Coronary artery disease involving native coronary artery of native heart without angina pectoris: Coronary artery disease involving native coronary artery of native heart without angina pectoris  Type 2 diabetes mellitus without complication, without long-term current use of insulin: Type 2 diabetes mellitus without complication, without long-term current use of insulin  Hyperlipidemia, unspecified hyperlipidemia type: Hyperlipidemia, unspecified hyperlipidemia type  Hypertension, unspecified type: Hypertension, unspecified type  CAD (Coronary Artery Disease): CAD (Coronary Artery Disease)

## 2019-02-14 NOTE — DISCHARGE NOTE ADULT - MEDICATION SUMMARY - MEDICATIONS TO STOP TAKING
I will STOP taking the medications listed below when I get home from the hospital:    rosuvastatin 10 mg oral tablet  -- 1 tab(s) by mouth once a day (at bedtime)  home and hospital    metFORMIN 1000 mg oral tablet  -- 1 tab(s) by mouth 2 times a day  home    ramipril 5 mg oral capsule  -- 1 cap(s) by mouth once a day  home

## 2019-02-14 NOTE — PROGRESS NOTE ADULT - ASSESSMENT
72 yo  male with PMHx of CAD (prior angioplasty but no stents, 15 years ago), HTN, depression, DMT2, HLD who was brought to [facility] after syncopal episode on 2/6, found to have depressed LVEF and renal dysfunction.     # CAD hx/ syncope/ HTN  s/p cardiac cath 2/12/19 showing severe triple vessel disease.   Viability study pending  CT surgery eval if viable  card Dr. Jean on the case  f/u appreciated.  c/w ASA 81 mg, Lipitor 40 mg daily, Coreg, hydralazine, isordil.    # DM  - c/w Lantus 16 units QHS  - sugars high, will add premeal insulin  - hgb A1c 7.3    # CKD  Cr stable ~ 2  post cath IVF  renal f/u appreciated  US with a small complex cyst, will need outpt follow up after cardiac issues taken care of  d/w pt and the wife at bedside. Can follow up with renal or PCP Dr. Sandra.    DVT ppx 72 yo  male with PMHx of CAD (prior angioplasty but no stents, 15 years ago), HTN, depression, DMT2, HLD who was brought to [facility] after syncopal episode on 2/6, found to have depressed LVEF and renal dysfunction.     # CAD hx/ syncope/ HTN  s/p cardiac cath 2/12/19 showing severe triple vessel disease.   Viability study pending  CT surgery eval if viable  card Dr. Jean on the case  f/u appreciated.  c/w ASA 81 mg, Plavix 75 mg, Lipitor 40 mg daily, Coreg, hydralazine, isordil.    # DM  - c/w Lantus 16 units QHS	  - sugars high, will add premeal insulin  - hgb A1c 7.3    # CKD  Cr stable ~ 2  post cath IVF  renal f/u appreciated  US with a small complex cyst, will need outpt follow up after cardiac issues taken care of  d/w pt and the wife at bedside. Can follow up with renal or PCP Dr. Sandra.    DVT ppx

## 2019-02-15 VITALS
DIASTOLIC BLOOD PRESSURE: 63 MMHG | OXYGEN SATURATION: 99 % | HEART RATE: 80 BPM | TEMPERATURE: 98 F | SYSTOLIC BLOOD PRESSURE: 134 MMHG | RESPIRATION RATE: 16 BRPM

## 2019-02-15 LAB
ANION GAP SERPL CALC-SCNC: 14 MMOL/L — SIGNIFICANT CHANGE UP (ref 5–17)
BASOPHILS # BLD AUTO: 0 K/UL — SIGNIFICANT CHANGE UP (ref 0–0.2)
BASOPHILS NFR BLD AUTO: 0.7 % — SIGNIFICANT CHANGE UP (ref 0–2)
BUN SERPL-MCNC: 38 MG/DL — HIGH (ref 7–23)
CALCIUM SERPL-MCNC: 9.2 MG/DL — SIGNIFICANT CHANGE UP (ref 8.4–10.5)
CHLORIDE SERPL-SCNC: 104 MMOL/L — SIGNIFICANT CHANGE UP (ref 96–108)
CO2 SERPL-SCNC: 19 MMOL/L — LOW (ref 22–31)
CREAT SERPL-MCNC: 1.98 MG/DL — HIGH (ref 0.5–1.3)
EOSINOPHIL # BLD AUTO: 0.2 K/UL — SIGNIFICANT CHANGE UP (ref 0–0.5)
EOSINOPHIL NFR BLD AUTO: 3 % — SIGNIFICANT CHANGE UP (ref 0–6)
GLUCOSE BLDC GLUCOMTR-MCNC: 232 MG/DL — HIGH (ref 70–99)
GLUCOSE BLDC GLUCOMTR-MCNC: 314 MG/DL — HIGH (ref 70–99)
GLUCOSE SERPL-MCNC: 206 MG/DL — HIGH (ref 70–99)
HCT VFR BLD CALC: 31 % — LOW (ref 39–50)
HGB BLD-MCNC: 10.5 G/DL — LOW (ref 13–17)
LYMPHOCYTES # BLD AUTO: 1.1 K/UL — SIGNIFICANT CHANGE UP (ref 1–3.3)
LYMPHOCYTES # BLD AUTO: 16.6 % — SIGNIFICANT CHANGE UP (ref 13–44)
MCHC RBC-ENTMCNC: 30.3 PG — SIGNIFICANT CHANGE UP (ref 27–34)
MCHC RBC-ENTMCNC: 33.8 GM/DL — SIGNIFICANT CHANGE UP (ref 32–36)
MCV RBC AUTO: 89.6 FL — SIGNIFICANT CHANGE UP (ref 80–100)
MONOCYTES # BLD AUTO: 0.5 K/UL — SIGNIFICANT CHANGE UP (ref 0–0.9)
MONOCYTES NFR BLD AUTO: 8 % — SIGNIFICANT CHANGE UP (ref 2–14)
NEUTROPHILS # BLD AUTO: 4.9 K/UL — SIGNIFICANT CHANGE UP (ref 1.8–7.4)
NEUTROPHILS NFR BLD AUTO: 71.8 % — SIGNIFICANT CHANGE UP (ref 43–77)
PLATELET # BLD AUTO: 214 K/UL — SIGNIFICANT CHANGE UP (ref 150–400)
POTASSIUM SERPL-MCNC: 4.5 MMOL/L — SIGNIFICANT CHANGE UP (ref 3.5–5.3)
POTASSIUM SERPL-SCNC: 4.5 MMOL/L — SIGNIFICANT CHANGE UP (ref 3.5–5.3)
RBC # BLD: 3.46 M/UL — LOW (ref 4.2–5.8)
RBC # FLD: 13.4 % — SIGNIFICANT CHANGE UP (ref 10.3–14.5)
SODIUM SERPL-SCNC: 137 MMOL/L — SIGNIFICANT CHANGE UP (ref 135–145)
WBC # BLD: 6.8 K/UL — SIGNIFICANT CHANGE UP (ref 3.8–10.5)
WBC # FLD AUTO: 6.8 K/UL — SIGNIFICANT CHANGE UP (ref 3.8–10.5)

## 2019-02-15 PROCEDURE — 83036 HEMOGLOBIN GLYCOSYLATED A1C: CPT

## 2019-02-15 PROCEDURE — A9505: CPT

## 2019-02-15 PROCEDURE — C1887: CPT

## 2019-02-15 PROCEDURE — 84436 ASSAY OF TOTAL THYROXINE: CPT

## 2019-02-15 PROCEDURE — 83735 ASSAY OF MAGNESIUM: CPT

## 2019-02-15 PROCEDURE — 78452 HT MUSCLE IMAGE SPECT MULT: CPT

## 2019-02-15 PROCEDURE — 84443 ASSAY THYROID STIM HORMONE: CPT

## 2019-02-15 PROCEDURE — 76775 US EXAM ABDO BACK WALL LIM: CPT

## 2019-02-15 PROCEDURE — 82728 ASSAY OF FERRITIN: CPT

## 2019-02-15 PROCEDURE — 83540 ASSAY OF IRON: CPT

## 2019-02-15 PROCEDURE — 84466 ASSAY OF TRANSFERRIN: CPT

## 2019-02-15 PROCEDURE — 99153 MOD SED SAME PHYS/QHP EA: CPT

## 2019-02-15 PROCEDURE — 83550 IRON BINDING TEST: CPT

## 2019-02-15 PROCEDURE — 85027 COMPLETE CBC AUTOMATED: CPT

## 2019-02-15 PROCEDURE — 81001 URINALYSIS AUTO W/SCOPE: CPT

## 2019-02-15 PROCEDURE — 76770 US EXAM ABDO BACK WALL COMP: CPT

## 2019-02-15 PROCEDURE — C1725: CPT

## 2019-02-15 PROCEDURE — 93880 EXTRACRANIAL BILAT STUDY: CPT

## 2019-02-15 PROCEDURE — 82607 VITAMIN B-12: CPT

## 2019-02-15 PROCEDURE — C1874: CPT

## 2019-02-15 PROCEDURE — 82746 ASSAY OF FOLIC ACID SERUM: CPT

## 2019-02-15 PROCEDURE — C1760: CPT

## 2019-02-15 PROCEDURE — 80048 BASIC METABOLIC PNL TOTAL CA: CPT

## 2019-02-15 PROCEDURE — C1769: CPT

## 2019-02-15 PROCEDURE — C1894: CPT

## 2019-02-15 PROCEDURE — 82962 GLUCOSE BLOOD TEST: CPT

## 2019-02-15 PROCEDURE — 99152 MOD SED SAME PHYS/QHP 5/>YRS: CPT

## 2019-02-15 PROCEDURE — 84480 ASSAY TRIIODOTHYRONINE (T3): CPT

## 2019-02-15 PROCEDURE — 80053 COMPREHEN METABOLIC PANEL: CPT

## 2019-02-15 PROCEDURE — 76937 US GUIDE VASCULAR ACCESS: CPT

## 2019-02-15 PROCEDURE — 71046 X-RAY EXAM CHEST 2 VIEWS: CPT

## 2019-02-15 PROCEDURE — 93454 CORONARY ARTERY ANGIO S&I: CPT

## 2019-02-15 PROCEDURE — 93005 ELECTROCARDIOGRAM TRACING: CPT

## 2019-02-15 PROCEDURE — C8929: CPT

## 2019-02-15 PROCEDURE — C9602: CPT | Mod: LC

## 2019-02-15 PROCEDURE — C1724: CPT

## 2019-02-15 RX ORDER — ROSUVASTATIN CALCIUM 5 MG/1
1 TABLET ORAL
Qty: 0 | Refills: 0 | COMMUNITY

## 2019-02-15 RX ORDER — ISOSORBIDE DINITRATE 5 MG/1
1 TABLET ORAL
Qty: 0 | Refills: 0 | COMMUNITY

## 2019-02-15 RX ORDER — RAMIPRIL 5 MG
1 CAPSULE ORAL
Qty: 0 | Refills: 0 | COMMUNITY

## 2019-02-15 RX ORDER — HYDRALAZINE HCL 50 MG
1 TABLET ORAL
Qty: 90 | Refills: 0 | OUTPATIENT
Start: 2019-02-15 | End: 2019-03-16

## 2019-02-15 RX ORDER — CLOPIDOGREL BISULFATE 75 MG/1
1 TABLET, FILM COATED ORAL
Qty: 90 | Refills: 3
Start: 2019-02-15 | End: 2020-02-09

## 2019-02-15 RX ORDER — ATORVASTATIN CALCIUM 80 MG/1
1 TABLET, FILM COATED ORAL
Qty: 30 | Refills: 0
Start: 2019-02-15 | End: 2019-03-16

## 2019-02-15 RX ORDER — ISOSORBIDE DINITRATE 5 MG/1
1 TABLET ORAL
Qty: 90 | Refills: 0 | OUTPATIENT
Start: 2019-02-15 | End: 2019-03-16

## 2019-02-15 RX ORDER — HYDRALAZINE HCL 50 MG
1 TABLET ORAL
Qty: 0 | Refills: 0 | COMMUNITY

## 2019-02-15 RX ORDER — CARVEDILOL PHOSPHATE 80 MG/1
1 CAPSULE, EXTENDED RELEASE ORAL
Qty: 60 | Refills: 0
Start: 2019-02-15 | End: 2019-03-16

## 2019-02-15 RX ORDER — METFORMIN HYDROCHLORIDE 850 MG/1
1 TABLET ORAL
Qty: 0 | Refills: 0 | COMMUNITY

## 2019-02-15 RX ORDER — CARVEDILOL PHOSPHATE 80 MG/1
1 CAPSULE, EXTENDED RELEASE ORAL
Qty: 0 | Refills: 0 | COMMUNITY

## 2019-02-15 RX ADMIN — Medication 25 MILLIGRAM(S): at 05:01

## 2019-02-15 RX ADMIN — CARVEDILOL PHOSPHATE 3.12 MILLIGRAM(S): 80 CAPSULE, EXTENDED RELEASE ORAL at 05:01

## 2019-02-15 RX ADMIN — ISOSORBIDE DINITRATE 5 MILLIGRAM(S): 5 TABLET ORAL at 16:22

## 2019-02-15 RX ADMIN — ISOSORBIDE DINITRATE 5 MILLIGRAM(S): 5 TABLET ORAL at 05:07

## 2019-02-15 RX ADMIN — Medication 3 UNIT(S): at 12:02

## 2019-02-15 RX ADMIN — Medication 81 MILLIGRAM(S): at 05:01

## 2019-02-15 RX ADMIN — Medication 1 TABLET(S): at 12:02

## 2019-02-15 RX ADMIN — Medication 2: at 08:10

## 2019-02-15 RX ADMIN — Medication 3 UNIT(S): at 08:10

## 2019-02-15 RX ADMIN — Medication 20 MILLIGRAM(S): at 12:02

## 2019-02-15 RX ADMIN — CLOPIDOGREL BISULFATE 75 MILLIGRAM(S): 75 TABLET, FILM COATED ORAL at 05:01

## 2019-02-15 RX ADMIN — Medication 25 MILLIGRAM(S): at 16:22

## 2019-02-15 RX ADMIN — Medication 4: at 12:02

## 2019-02-15 NOTE — PROGRESS NOTE ADULT - SUBJECTIVE AND OBJECTIVE BOX
[facility] Cardiology Progress Note  _______________________________    Pt. seen and examined. No new cardiac-related complaints. s/p 2 guillermina 2/14/19. No complaints today.    Telemetry -sinus 70s    T(C): 36.8 (02-15-19 @ 04:40), Max: 36.8 (02-14-19 @ 20:15)  HR: 72 (02-15-19 @ 04:40) (62 - 72)  BP: 134/66 (02-15-19 @ 04:40) (127/55 - 160/77)  RR: 17 (02-15-19 @ 04:40) (16 - 17)  SpO2: 98% (02-15-19 @ 04:40) (97% - 100%)  I&O's Summary    14 Feb 2019 07:01  -  15 Feb 2019 07:00  --------------------------------------------------------  IN: 1640 mL / OUT: 0 mL / NET: 1640 mL        PHYSICAL EXAM:  GENERAL: Alert, NAD.  NECK: Supple  CHEST/LUNG: Clear to auscultation bilaterally; No wheezes, rales, or rhonchi.  HEART: S1 S2 normal, RRR; No murmurs, rubs, or gallops  ABDOMEN: Soft, Nondistended  EXTREMITIES:  No LE edema.      LABS:                        10.5   6.8   )-----------( 214      ( 15 Feb 2019 05:18 )             31.0     02-15    137  |  104  |  38<H>  ----------------------------<  206<H>  4.5   |  19<L>  |  1.98<H>    Ca    9.2      15 Feb 2019 05:18  Mg     1.9     02-14    TPro  6.6  /  Alb  4.0  /  TBili  0.2  /  DBili  x   /  AST  21  /  ALT  21  /  AlkPhos  100  02-14                  MEDICATIONS  (STANDING):  aspirin enteric coated 81 milliGRAM(s) Oral daily  atorvastatin 40 milliGRAM(s) Oral at bedtime  carvedilol 3.125 milliGRAM(s) Oral every 12 hours  clopidogrel Tablet 75 milliGRAM(s) Oral daily  dextrose 5%. 1000 milliLiter(s) (50 mL/Hr) IV Continuous <Continuous>  dextrose 50% Injectable 12.5 Gram(s) IV Push once  dextrose 50% Injectable 25 Gram(s) IV Push once  dextrose 50% Injectable 25 Gram(s) IV Push once  hydrALAZINE 25 milliGRAM(s) Oral every 8 hours  insulin glargine Injectable (LANTUS) 16 Unit(s) SubCutaneous at bedtime  insulin lispro (HumaLOG) corrective regimen sliding scale   SubCutaneous three times a day before meals  insulin lispro (HumaLOG) corrective regimen sliding scale   SubCutaneous at bedtime  insulin lispro Injectable (HumaLOG) 3 Unit(s) SubCutaneous three times a day before meals  isosorbide   dinitrate Tablet (ISORDIL) 5 milliGRAM(s) Oral three times a day  multivitamin/minerals 1 Tablet(s) Oral daily  PARoxetine 20 milliGRAM(s) Oral daily  sodium chloride 0.9%. 1000 milliLiter(s) (60 mL/Hr) IV Continuous <Continuous>  sodium chloride 0.9%. 1000 milliLiter(s) (60 mL/Hr) IV Continuous <Continuous>    MEDICATIONS  (PRN):  dextrose 40% Gel 15 Gram(s) Oral once PRN Blood Glucose LESS THAN 70 milliGRAM(s)/deciliter  glucagon  Injectable 1 milliGRAM(s) IntraMuscular once PRN Glucose LESS THAN 70 milligrams/deciliter  zaleplon 5 milliGRAM(s) Oral at bedtime PRN Insomnia        RADIOLOGY & ADDITIONAL TESTS:

## 2019-02-15 NOTE — PROGRESS NOTE ADULT - SUBJECTIVE AND OBJECTIVE BOX
Patient is a 73y old  Male who presents with a chief complaint of NSTEMI (14 Feb 2019 23:57)      SUBJECTIVE / OVERNIGHT EVENTS:  feels better. no events.  the wife at bedside.  no cp, no sob, no n/v/d. no abdominal pain.  no headache, no dizziness.   s/p stent.       Vital Signs Last 24 Hrs  T(C): 36.9 (15 Feb 2019 13:26), Max: 36.9 (15 Feb 2019 13:26)  T(F): 98.4 (15 Feb 2019 13:26), Max: 98.4 (15 Feb 2019 13:26)  HR: 70 (15 Feb 2019 13:26) (65 - 72)  BP: 153/71 (15 Feb 2019 13:26) (134/66 - 160/77)  BP(mean): --  RR: 17 (15 Feb 2019 13:26) (16 - 17)  SpO2: 100% (15 Feb 2019 13:26) (98% - 100%)  I&O's Summary    14 Feb 2019 07:01  -  15 Feb 2019 07:00  --------------------------------------------------------  IN: 1640 mL / OUT: 0 mL / NET: 1640 mL    15 Feb 2019 07:01  -  15 Feb 2019 17:15  --------------------------------------------------------  IN: 560 mL / OUT: 0 mL / NET: 560 mL        PHYSICAL EXAM:  GENERAL: NAD, Comfortable  HEAD:  Atraumatic, Normocephalic  EYES: EOMI, PERRLA, conjunctiva and sclera clear  NECK: Supple, No JVD  CHEST/LUNG: Clear to auscultation bilaterally; No wheeze  HEART: Regular rate and rhythm; No murmurs, rubs, or gallops  ABDOMEN: Soft, Nontender, Nondistended; Bowel sounds present  Neuro: AAO x 3, no focal deficit, 5/5 b/l extremities  EXTREMITIES:  2+ Peripheral Pulses, No clubbing, cyanosis, or edema  SKIN: No rashes or lesions    LABS:                        10.5   6.8   )-----------( 214      ( 15 Feb 2019 05:18 )             31.0     02-15    137  |  104  |  38<H>  ----------------------------<  206<H>  4.5   |  19<L>  |  1.98<H>    Ca    9.2      15 Feb 2019 05:18  Mg     1.9     02-14    TPro  6.6  /  Alb  4.0  /  TBili  0.2  /  DBili  x   /  AST  21  /  ALT  21  /  AlkPhos  100  02-14      CAPILLARY BLOOD GLUCOSE      POCT Blood Glucose.: 314 mg/dL (15 Feb 2019 11:18)  POCT Blood Glucose.: 232 mg/dL (15 Feb 2019 07:31)  POCT Blood Glucose.: 261 mg/dL (14 Feb 2019 21:11)  POCT Blood Glucose.: 241 mg/dL (14 Feb 2019 17:49)            RADIOLOGY & ADDITIONAL TESTS:    Imaging Personally Reviewed:  [x] YES  [ ] NO    Consultant(s) Notes Reviewed:  [x] YES  [ ] NO      MEDICATIONS  (STANDING):  aspirin enteric coated 81 milliGRAM(s) Oral daily  atorvastatin 40 milliGRAM(s) Oral at bedtime  carvedilol 3.125 milliGRAM(s) Oral every 12 hours  clopidogrel Tablet 75 milliGRAM(s) Oral daily  dextrose 5%. 1000 milliLiter(s) (50 mL/Hr) IV Continuous <Continuous>  dextrose 50% Injectable 12.5 Gram(s) IV Push once  dextrose 50% Injectable 25 Gram(s) IV Push once  dextrose 50% Injectable 25 Gram(s) IV Push once  hydrALAZINE 25 milliGRAM(s) Oral every 8 hours  insulin glargine Injectable (LANTUS) 16 Unit(s) SubCutaneous at bedtime  insulin lispro (HumaLOG) corrective regimen sliding scale   SubCutaneous three times a day before meals  insulin lispro (HumaLOG) corrective regimen sliding scale   SubCutaneous at bedtime  insulin lispro Injectable (HumaLOG) 3 Unit(s) SubCutaneous three times a day before meals  isosorbide   dinitrate Tablet (ISORDIL) 5 milliGRAM(s) Oral three times a day  multivitamin/minerals 1 Tablet(s) Oral daily  PARoxetine 20 milliGRAM(s) Oral daily  sodium chloride 0.9%. 1000 milliLiter(s) (60 mL/Hr) IV Continuous <Continuous>  sodium chloride 0.9%. 1000 milliLiter(s) (60 mL/Hr) IV Continuous <Continuous>    MEDICATIONS  (PRN):  dextrose 40% Gel 15 Gram(s) Oral once PRN Blood Glucose LESS THAN 70 milliGRAM(s)/deciliter  glucagon  Injectable 1 milliGRAM(s) IntraMuscular once PRN Glucose LESS THAN 70 milligrams/deciliter  zaleplon 5 milliGRAM(s) Oral at bedtime PRN Insomnia      Care Discussed with Consultants/Other Providers [x] YES  [ ] NO    HEALTH ISSUES - PROBLEM Dx:  Cardiomyopathy, unspecified type: Cardiomyopathy, unspecified type  CKD (chronic kidney disease), stage IV: CKD (chronic kidney disease), stage IV  Coronary artery disease involving native coronary artery of native heart without angina pectoris: Coronary artery disease involving native coronary artery of native heart without angina pectoris  Type 2 diabetes mellitus without complication, without long-term current use of insulin: Type 2 diabetes mellitus without complication, without long-term current use of insulin  Hyperlipidemia, unspecified hyperlipidemia type: Hyperlipidemia, unspecified hyperlipidemia type  Hypertension, unspecified type: Hypertension, unspecified type  CAD (Coronary Artery Disease): CAD (Coronary Artery Disease)

## 2019-02-15 NOTE — PROGRESS NOTE ADULT - PROBLEM SELECTOR PLAN 1
-  s/p cardiac cath 2/12/19 showing severe triple vessel disease.   -Viability study showed no viability. s/p cath with 2 guillermina.  -ASA 81 mg  -Plavix 75 mg daily for 3 months.   -Lipitor 40 mg daily  -Coreg, hydralazine, isordil.

## 2019-02-15 NOTE — PROGRESS NOTE ADULT - NSHPATTENDINGPLANDISCUSS_GEN_ALL_CORE
pt and the wife at bedside. d/w card Dr. Jean and renal Dr. Nelson
pt and the wife at bedside. d/w card Dr. Jean and renal Dr. Nelson, d/w cardiac NP

## 2019-02-15 NOTE — PROGRESS NOTE ADULT - PROVIDER SPECIALTY LIST ADULT
Cardiology
Internal Medicine
Nephrology
Internal Medicine

## 2019-02-15 NOTE — PROGRESS NOTE ADULT - ASSESSMENT
74 yo  male with PMHx of CAD (prior angioplasty but no stents, 15 years ago), HTN, depression, DMT2, HLD who was brought to [facility] after syncopal episode on 2/6, found to have depressed LVEF and renal dysfunction.     # CAD hx/ syncope/ HTN  s/p cardiac cath 2/12/19 showing severe triple vessel disease.   Viability study - fixed defect  card Dr. Jean, f/u appreciated.  c/w ASA 81 mg, Plavix 75 mg, Lipitor 40 mg daily, Coreg, hydralazine, isordil.  continue to hold ACEI for now.     # DM  - c/w Lantus 16 units QHS and on low dose premeal  - hgb A1c 7.3  - resume home meds     # CKD  Cr stable ~ 2  post cath IVF  renal f/u appreciated  US with a small complex cyst, will need outpt follow up after cardiac issues taken care of  d/w pt and the wife at bedside. Can follow up with renal or PCP Dr. Sandra.    d/c planning home today.     DVT ppx

## 2019-02-15 NOTE — PROGRESS NOTE ADULT - ATTENDING COMMENTS
- Dr. NICHOL Rothman ([facility])  - [phone]
- Dr. NICHOL Rothman ([facility])  - [phone]
[place]rell Marie, [facility]    [phone]

## 2019-02-15 NOTE — PROGRESS NOTE ADULT - ASSESSMENT
74 yo  male with PMHx of CAD (prior angioplasty but no stents, 15 years ago), HTN, depression, DMT2, HLD who was brought to [facility]"" after syncopal episode on 2/6, found to have depressed LVEF and renal dysfunction.   s/p cardiac cath 2/12/19 showing severe triple vessel disease.   s/p repeat cath 2/14/19 with pci s/p 2 guillermina.

## 2019-02-15 NOTE — PROGRESS NOTE ADULT - PROBLEM SELECTOR PLAN 3
-  -S/p cardiac cath showing significant triple vessel disease.   -Viability study 2/13/19 showed no viability. s/p pci with 2 guillermina.   -ASA 81 mg  -Lipitor 40 mg daily  -Coreg, hydralazine, isordil.    Ok for discharge and follow up with me in 1 week.    Steven Jean D.O.  [phone]

## 2019-03-04 PROBLEM — Z00.00 ENCOUNTER FOR PREVENTIVE HEALTH EXAMINATION: Status: ACTIVE | Noted: 2019-03-04

## 2019-03-05 PROBLEM — E11.9 TYPE 2 DIABETES MELLITUS WITHOUT COMPLICATIONS: Chronic | Status: ACTIVE | Noted: 2019-02-11

## 2019-03-05 PROBLEM — N18.4 CHRONIC KIDNEY DISEASE, STAGE 4 (SEVERE): Chronic | Status: ACTIVE | Noted: 2019-02-11

## 2019-03-13 ENCOUNTER — APPOINTMENT (OUTPATIENT)
Dept: ELECTROPHYSIOLOGY | Facility: CLINIC | Age: 74
End: 2019-03-13
Payer: MEDICARE

## 2019-03-13 VITALS
OXYGEN SATURATION: 98 % | SYSTOLIC BLOOD PRESSURE: 138 MMHG | BODY MASS INDEX: 29.96 KG/M2 | WEIGHT: 214 LBS | DIASTOLIC BLOOD PRESSURE: 67 MMHG | HEIGHT: 71 IN | HEART RATE: 62 BPM

## 2019-03-13 DIAGNOSIS — E11.9 TYPE 2 DIABETES MELLITUS W/OUT COMPLICATIONS: ICD-10-CM

## 2019-03-13 DIAGNOSIS — I10 ESSENTIAL (PRIMARY) HYPERTENSION: ICD-10-CM

## 2019-03-13 DIAGNOSIS — T78.40XA ALLERGY, UNSPECIFIED, INITIAL ENCOUNTER: ICD-10-CM

## 2019-03-13 DIAGNOSIS — F32.9 MAJOR DEPRESSIVE DISORDER, SINGLE EPISODE, UNSPECIFIED: ICD-10-CM

## 2019-03-13 DIAGNOSIS — I25.10 ATHEROSCLEROTIC HEART DISEASE OF NATIVE CORONARY ARTERY W/OUT ANGINA PECTORIS: ICD-10-CM

## 2019-03-13 DIAGNOSIS — E78.5 HYPERLIPIDEMIA, UNSPECIFIED: ICD-10-CM

## 2019-03-13 PROCEDURE — 99205 OFFICE O/P NEW HI 60 MIN: CPT

## 2019-03-13 RX ORDER — FEXOFENADINE HYDROCHLORIDE 180 MG/1
180 TABLET ORAL
Refills: 0 | Status: ACTIVE | COMMUNITY
Start: 2019-03-13

## 2019-03-13 RX ORDER — PIOGLITAZONE HYDROCHLORIDE 30 MG/1
30 TABLET ORAL DAILY
Refills: 0 | Status: ACTIVE | COMMUNITY
Start: 2019-03-13

## 2019-03-13 RX ORDER — ATORVASTATIN CALCIUM 40 MG/1
40 TABLET, FILM COATED ORAL
Qty: 90 | Refills: 0 | Status: ACTIVE | COMMUNITY
Start: 2019-03-13

## 2019-03-13 RX ORDER — HYDRALAZINE HYDROCHLORIDE 25 MG/1
25 TABLET ORAL 3 TIMES DAILY
Qty: 270 | Refills: 0 | Status: DISCONTINUED | COMMUNITY
Start: 2019-03-13 | End: 2019-03-13

## 2019-03-13 RX ORDER — ASPIRIN ENTERIC COATED TABLETS 81 MG 81 MG/1
81 TABLET, DELAYED RELEASE ORAL
Qty: 90 | Refills: 2 | Status: ACTIVE | COMMUNITY
Start: 2019-03-13

## 2019-03-13 RX ORDER — INSULIN GLARGINE 100 [IU]/ML
100 INJECTION, SOLUTION SUBCUTANEOUS AT BEDTIME
Refills: 0 | Status: ACTIVE | COMMUNITY
Start: 2019-03-13

## 2019-03-13 RX ORDER — MV-MIN/FOLIC/K1/LYCOPEN/LUTEIN 300-60 MCG
TABLET ORAL DAILY
Refills: 0 | Status: ACTIVE | COMMUNITY
Start: 2019-03-13

## 2019-03-13 RX ORDER — PAROXETINE HYDROCHLORIDE 20 MG/1
20 TABLET, FILM COATED ORAL DAILY
Refills: 0 | Status: ACTIVE | COMMUNITY
Start: 2019-03-13

## 2019-03-13 RX ORDER — GLIPIZIDE 10 MG/1
10 TABLET ORAL DAILY
Refills: 0 | Status: ACTIVE | COMMUNITY
Start: 2019-03-13

## 2019-03-13 RX ORDER — ISOSORBIDE DINITRATE 5 MG/1
5 TABLET ORAL EVERY 8 HOURS
Refills: 0 | Status: DISCONTINUED | COMMUNITY
Start: 2019-03-13 | End: 2019-03-13

## 2019-03-13 RX ORDER — CARVEDILOL 3.12 MG/1
3.12 TABLET, FILM COATED ORAL
Qty: 60 | Refills: 5 | Status: ACTIVE | COMMUNITY
Start: 2019-03-13

## 2019-03-13 RX ORDER — CLOPIDOGREL BISULFATE 75 MG/1
75 TABLET, FILM COATED ORAL DAILY
Refills: 0 | Status: ACTIVE | COMMUNITY
Start: 2019-03-13

## 2019-03-25 PROBLEM — E11.9 DIABETES MELLITUS: Status: ACTIVE | Noted: 2019-03-13

## 2019-03-25 PROBLEM — I10 HTN (HYPERTENSION): Status: ACTIVE | Noted: 2019-03-13

## 2019-03-25 NOTE — REVIEW OF SYSTEMS
[see HPI] : see HPI [Joint Pain] : joint pain [Negative] : Endocrine [Fever] : no fever [Recent Weight Gain (___ Lbs)] : no recent weight gain [Feeling Fatigued] : not feeling fatigued [Cough] : no cough [Easy Bleeding] : no tendency for easy bleeding [Easy Bruising] : no tendency for easy bruising

## 2019-03-25 NOTE — HISTORY OF PRESENT ILLNESS
[FreeTextEntry1] : He is a 74 yo man seen in evaluation of recent syncope episodes. \par First episode occurred on 1/19 - happened while exercising - evaulated at [facility] - dx with flu\par Second episode occurred a month later - evaluated an [facility] and was found to be anemic with EF 30%. He had an abnormal stress test and had cardiac cath at [facility] - 100% Circ, 99% RCA - had stent to Circ. He had another syncope episode while driving - it was very transient - in setting of decrease fluids and had taken hydralazine/Coreg. He did not go to the hospital\par Repeat echo reported EF 40%\par Holter - no arrhythmia noted\par 2 wk event monitor - ongoing. \par He is feeling well now and no palpitations, chest pain, SOB, dizziness or lightheadedness. H ewas previously noted to be orthostatic - hydralazine was discontinued.

## 2019-03-25 NOTE — CARDIOLOGY SUMMARY
[No Ischemia] : no Ischemia [Enlarged] : enlarged LA size [Mild] : mild mitral regurgitation [___] : [unfilled]

## 2019-03-25 NOTE — DISCUSSION/SUMMARY
[FreeTextEntry1] : He has reduced EF40%, coronary disease and syncope episodes. The first two episode - ? volume/BP related. The last episode - very transient and ? BP related - orthostatic and has been on Hydralazine, nitrates and coreg.  \par Because of coronary disease and LV dysfunction  would recommend EP testing even though there as potential plausible explanations. I discussed this option with  the patient and his wife and his brother ( physician) via telephone. The EP study, limitations, risks, benefits and alternatives were fully discussed. He does not seem eager to have the EP study. Option of monitoring via the ILR also discussed. He may be interested in this option. He will call to schedule. \par

## 2019-03-25 NOTE — PHYSICAL EXAM
[General Appearance - Well Developed] : well developed [General Appearance - In No Acute Distress] : no acute distress [Eyelids - No Xanthelasma] : the eyelids demonstrated no xanthelasmas [Normal Oral Mucosa] : normal oral mucosa [No Oral Pallor] : no oral pallor [Normal Jugular Venous V Waves Present] : normal jugular venous V waves present [Heart Rate And Rhythm] : heart rate and rhythm were normal [Heart Sounds] : normal S1 and S2 [Murmurs] : no murmurs present [Arterial Pulses Normal] : the arterial pulses were normal [Edema] : no peripheral edema present [Respiration, Rhythm And Depth] : normal respiratory rhythm and effort [Exaggerated Use Of Accessory Muscles For Inspiration] : no accessory muscle use [Bowel Sounds] : normal bowel sounds [Abdomen Soft] : soft [Abdomen Tenderness] : non-tender [Abdomen Mass (___ Cm)] : no abdominal mass palpated [Abnormal Walk] : normal gait [Gait - Sufficient For Exercise Testing] : the gait was sufficient for exercise testing [Nail Clubbing] : no clubbing of the fingernails [Cyanosis, Localized] : no localized cyanosis [] : no rash [No Venous Stasis] : no venous stasis [Impaired Insight] : insight and judgment were intact [No Anxiety] : not feeling anxious

## 2019-04-19 ENCOUNTER — APPOINTMENT (OUTPATIENT)
Dept: ELECTROPHYSIOLOGY | Facility: CLINIC | Age: 74
End: 2019-04-19

## 2019-04-24 ENCOUNTER — OUTPATIENT (OUTPATIENT)
Dept: OUTPATIENT SERVICES | Facility: HOSPITAL | Age: 74
LOS: 1 days | End: 2019-04-24
Payer: MEDICARE

## 2019-04-24 VITALS
SYSTOLIC BLOOD PRESSURE: 162 MMHG | TEMPERATURE: 98 F | DIASTOLIC BLOOD PRESSURE: 70 MMHG | HEIGHT: 71 IN | WEIGHT: 205.91 LBS | HEART RATE: 63 BPM | RESPIRATION RATE: 16 BRPM | OXYGEN SATURATION: 98 %

## 2019-04-24 DIAGNOSIS — Z90.89 ACQUIRED ABSENCE OF OTHER ORGANS: Chronic | ICD-10-CM

## 2019-04-24 DIAGNOSIS — R55 SYNCOPE AND COLLAPSE: ICD-10-CM

## 2019-04-24 LAB — GLUCOSE BLDC GLUCOMTR-MCNC: 139 MG/DL — HIGH (ref 70–99)

## 2019-04-24 PROCEDURE — C1764: CPT

## 2019-04-24 PROCEDURE — 33285 INSJ SUBQ CAR RHYTHM MNTR: CPT

## 2019-04-24 PROCEDURE — 82962 GLUCOSE BLOOD TEST: CPT

## 2019-04-24 PROCEDURE — 93005 ELECTROCARDIOGRAM TRACING: CPT

## 2019-04-24 PROCEDURE — 93010 ELECTROCARDIOGRAM REPORT: CPT

## 2019-04-24 RX ORDER — ENOXAPARIN SODIUM 100 MG/ML
16 INJECTION SUBCUTANEOUS
Qty: 0 | Refills: 0 | COMMUNITY

## 2019-04-24 NOTE — H&P CARDIOLOGY - FAMILY HISTORY
Father  Still living? No  Family history of acute myocardial infarction of anterior wall, Age at diagnosis: Age Unknown

## 2019-04-24 NOTE — H&P CARDIOLOGY - HISTORY OF PRESENT ILLNESS
This is a 74 yo  male with PMHx of CAD (prior angioplasty but no stents, 15 years ago), HTN, depression, DMT2, HLD who was brought to [facility] after syncopal episode on 2/6, serial CT head negative for acute intracranial pathology, no visible trauma, trop 0.137-->0.239-->0.160, -->249, CKMB 4.7--> 5.8, proBNP 1948, Echo (2/7) with EF 30%, moderate decreased LVSF, mild to moderate MV regurgitation, entire lateral wall, entire inferior wall, LAD distribution, and entire septum, chest xray negative.  Patient found to be in CKD stage IV, gfr 28, s.Cr. 2.3.  Patient started on aspirin, coreg 3.125mg bid, hydralizine 25 tid, isosorbide dinatrate 5mg tid.  Patient transferred to [facility] today for LOOP IMPLANT.due to Syncope.   Currently CP free no sob no palpitations noted.     PCP Heather Sloan [phone]  Cardiologist will be Dr Goldberg, Steven   < from: Cardiac Cath Lab - Adult (02.14.19 @ 14:33) >  Unstable angina - CCS4.  HISTORY: The patient has a history of coronary artery disease. The patient  has hypertension and medication-treated dyslipidemia.  PROCEDURE:  --  Sonosite - Interventional.  --  Hemostasis with Angioseal-Intervention.  --  Intervention on proximal circumflex: drug-eluting stent.  TECHNIQUE: Therisks and alternatives of the procedures and conscious  sedation were explained to the patient and informed consent was obtained.  Cardiac catheterization performed urgently. Coronary intervention  performed electively.  Local anesthetic given. Rightfemoral artery access. RADIATION EXPOSURE:  21.3 min. A successful drug-eluting stent was performed on the 100 %  lesion in the proximal circumflex. Following intervention there was a 1 %  residual stenosis. According to the ACC/AHA classification system, this  lesion was a type C lesion. There was KATERINA 1 flow before the procedure and  KATERINA 3 flow after the procedure. Vessel setup was performed. A 6FR EBU 4.0  LAUNCHER guiding catheter was used to intubate the vessel. Balloon  angioplasty was performed, with 1 inflations and a maximum inflation  pressure of 10 angélica. Rotational atherectomy was performed, with a 2 passes.  Balloon angioplasty was performed, using a 1.5 X 15 EUPHORA balloon, with  4 inflations and a maximum inflation pressure of 20 angélica. Balloon  angioplasty was performed, using a 2.5 X 15 EUPHORA balloon, with 4  inflations and a maximum inflation pressure of 16 angélica. A 2.50 X 32 SYNERGY  drug-eluting stent was placed across the lesion and deployed at a maximum  inflation pressure of 22 angélica. A 3.00 X 8 SYNERGY drug-eluting stent was  placed across the lesion and deployed at a maximum inflation pressure of  22 angélica. Sonosite - Interventional. Hemostasis with Angioseal-Intervention.  CONTRAST GIVEN: Omnipaque 52 ml.  MEDICATIONS GIVEN: Fentanyl, 50 mcg, IV. Midazolam, 1 mg, IV.  Nitroglycerin, 100 mcg, intracoronary. Heparin, 9500 units, IV. Aspirin,  325 mg, PO. Clopidogrel (Plavix), 600 mg, PO.  CORONARY VESSELS: The coronary circulation is right dominant.  CX:   --  Proximal circumflex: There was a 100 % stenosis.  COMPLICATIONS: There were no complications.  INTERVENTIONAL RECOMMENDATIONS: ASA and Plavix for 3 mths  Prepared and signed by  Adithya Dodd M.D.  Signed 02/14/2019 16:04:57  < from: TTE with Doppler (w/Cont) (02.11.19 @ 18:47) >  Conclusions:  1. Mitral annular calcification, otherwise normal mitral  valve.  2. Calcified trileaflet aortic valve with decreased  opening.  3. Moderate global LV dysfunction with severe segmental  left ventricular systolic dysfunction. Akinesis and  atrophy/scarring of the base to mid inferolateral and  inferior walls.  4. Mild diastolic dysfunction (Stage I).  5. The right ventricle is not well visualized; grossly  normal right ventricular systolic function.  *** No recent ECHO for comparison.  ------------------------------------------------------------------------  Confirmed on  2/12/2019 - 07:20:49 by Evelin Waters M.D.  ------------------------------------------------------------------------    < end of copied text >

## 2019-05-09 ENCOUNTER — APPOINTMENT (OUTPATIENT)
Dept: ELECTROPHYSIOLOGY | Facility: CLINIC | Age: 74
End: 2019-05-09
Payer: MEDICARE

## 2019-05-09 VITALS
HEIGHT: 71 IN | DIASTOLIC BLOOD PRESSURE: 66 MMHG | HEART RATE: 62 BPM | WEIGHT: 208 LBS | BODY MASS INDEX: 29.12 KG/M2 | SYSTOLIC BLOOD PRESSURE: 142 MMHG | OXYGEN SATURATION: 98 %

## 2019-05-09 DIAGNOSIS — R55 SYNCOPE AND COLLAPSE: ICD-10-CM

## 2019-05-09 PROCEDURE — 93291 INTERROG DEV EVAL SCRMS IP: CPT

## 2019-06-11 ENCOUNTER — APPOINTMENT (OUTPATIENT)
Dept: ELECTROPHYSIOLOGY | Facility: CLINIC | Age: 74
End: 2019-06-11
Payer: MEDICARE

## 2019-06-11 PROCEDURE — 93298 REM INTERROG DEV EVAL SCRMS: CPT

## 2019-06-11 PROCEDURE — 93299: CPT

## 2019-07-12 ENCOUNTER — APPOINTMENT (OUTPATIENT)
Dept: ELECTROPHYSIOLOGY | Facility: CLINIC | Age: 74
End: 2019-07-12
Payer: MEDICARE

## 2019-07-12 PROCEDURE — 93299: CPT

## 2019-07-12 PROCEDURE — 93298 REM INTERROG DEV EVAL SCRMS: CPT

## 2019-08-13 ENCOUNTER — APPOINTMENT (OUTPATIENT)
Dept: ELECTROPHYSIOLOGY | Facility: CLINIC | Age: 74
End: 2019-08-13

## 2019-08-16 ENCOUNTER — EMERGENCY (EMERGENCY)
Facility: HOSPITAL | Age: 74
LOS: 1 days | Discharge: HOME | End: 2019-08-16
Attending: EMERGENCY MEDICINE
Payer: MEDICARE

## 2019-08-16 VITALS
DIASTOLIC BLOOD PRESSURE: 83 MMHG | OXYGEN SATURATION: 99 % | WEIGHT: 197.09 LBS | HEIGHT: 71 IN | TEMPERATURE: 98 F | HEART RATE: 66 BPM | RESPIRATION RATE: 18 BRPM | SYSTOLIC BLOOD PRESSURE: 138 MMHG

## 2019-08-16 VITALS
HEART RATE: 68 BPM | RESPIRATION RATE: 16 BRPM | DIASTOLIC BLOOD PRESSURE: 72 MMHG | SYSTOLIC BLOOD PRESSURE: 151 MMHG | OXYGEN SATURATION: 98 % | TEMPERATURE: 98 F

## 2019-08-16 DIAGNOSIS — Z90.89 ACQUIRED ABSENCE OF OTHER ORGANS: Chronic | ICD-10-CM

## 2019-08-16 LAB
ALBUMIN SERPL ELPH-MCNC: 4.3 G/DL — SIGNIFICANT CHANGE UP (ref 3.3–5)
ALP SERPL-CCNC: 109 U/L — SIGNIFICANT CHANGE UP (ref 40–120)
ALT FLD-CCNC: 28 U/L — SIGNIFICANT CHANGE UP (ref 10–45)
ANION GAP SERPL CALC-SCNC: 15 MMOL/L — SIGNIFICANT CHANGE UP (ref 5–17)
APTT BLD: 27.8 SEC — SIGNIFICANT CHANGE UP (ref 27.5–36.3)
AST SERPL-CCNC: 25 U/L — SIGNIFICANT CHANGE UP (ref 10–40)
BASOPHILS # BLD AUTO: 0 K/UL — SIGNIFICANT CHANGE UP (ref 0–0.2)
BASOPHILS NFR BLD AUTO: 0.3 % — SIGNIFICANT CHANGE UP (ref 0–2)
BILIRUB SERPL-MCNC: 0.4 MG/DL — SIGNIFICANT CHANGE UP (ref 0.2–1.2)
BUN SERPL-MCNC: 56 MG/DL — HIGH (ref 7–23)
CALCIUM SERPL-MCNC: 9.6 MG/DL — SIGNIFICANT CHANGE UP (ref 8.4–10.5)
CHLORIDE SERPL-SCNC: 104 MMOL/L — SIGNIFICANT CHANGE UP (ref 96–108)
CO2 SERPL-SCNC: 19 MMOL/L — LOW (ref 22–31)
CREAT SERPL-MCNC: 2.52 MG/DL — HIGH (ref 0.5–1.3)
EOSINOPHIL # BLD AUTO: 0.1 K/UL — SIGNIFICANT CHANGE UP (ref 0–0.5)
EOSINOPHIL NFR BLD AUTO: 1.5 % — SIGNIFICANT CHANGE UP (ref 0–6)
GLUCOSE SERPL-MCNC: 125 MG/DL — HIGH (ref 70–99)
HCT VFR BLD CALC: 34.5 % — LOW (ref 39–50)
HGB BLD-MCNC: 11.5 G/DL — LOW (ref 13–17)
INR BLD: 0.99 RATIO — SIGNIFICANT CHANGE UP (ref 0.88–1.16)
LYMPHOCYTES # BLD AUTO: 0.9 K/UL — LOW (ref 1–3.3)
LYMPHOCYTES # BLD AUTO: 12.3 % — LOW (ref 13–44)
MCHC RBC-ENTMCNC: 30.8 PG — SIGNIFICANT CHANGE UP (ref 27–34)
MCHC RBC-ENTMCNC: 33.4 GM/DL — SIGNIFICANT CHANGE UP (ref 32–36)
MCV RBC AUTO: 92.2 FL — SIGNIFICANT CHANGE UP (ref 80–100)
MONOCYTES # BLD AUTO: 0.5 K/UL — SIGNIFICANT CHANGE UP (ref 0–0.9)
MONOCYTES NFR BLD AUTO: 7 % — SIGNIFICANT CHANGE UP (ref 2–14)
NEUTROPHILS # BLD AUTO: 5.8 K/UL — SIGNIFICANT CHANGE UP (ref 1.8–7.4)
NEUTROPHILS NFR BLD AUTO: 79 % — HIGH (ref 43–77)
PLATELET # BLD AUTO: 234 K/UL — SIGNIFICANT CHANGE UP (ref 150–400)
POTASSIUM SERPL-MCNC: 4.9 MMOL/L — SIGNIFICANT CHANGE UP (ref 3.5–5.3)
POTASSIUM SERPL-SCNC: 4.9 MMOL/L — SIGNIFICANT CHANGE UP (ref 3.5–5.3)
PROT SERPL-MCNC: 6.9 G/DL — SIGNIFICANT CHANGE UP (ref 6–8.3)
PROTHROM AB SERPL-ACNC: 11.4 SEC — SIGNIFICANT CHANGE UP (ref 10–12.9)
RBC # BLD: 3.74 M/UL — LOW (ref 4.2–5.8)
RBC # FLD: 14.4 % — SIGNIFICANT CHANGE UP (ref 10.3–14.5)
SODIUM SERPL-SCNC: 138 MMOL/L — SIGNIFICANT CHANGE UP (ref 135–145)
WBC # BLD: 7.3 K/UL — SIGNIFICANT CHANGE UP (ref 3.8–10.5)
WBC # FLD AUTO: 7.3 K/UL — SIGNIFICANT CHANGE UP (ref 3.8–10.5)

## 2019-08-16 PROCEDURE — 85730 THROMBOPLASTIN TIME PARTIAL: CPT

## 2019-08-16 PROCEDURE — 85027 COMPLETE CBC AUTOMATED: CPT

## 2019-08-16 PROCEDURE — 85610 PROTHROMBIN TIME: CPT

## 2019-08-16 PROCEDURE — 99283 EMERGENCY DEPT VISIT LOW MDM: CPT

## 2019-08-16 PROCEDURE — 80053 COMPREHEN METABOLIC PANEL: CPT

## 2019-08-16 PROCEDURE — 99284 EMERGENCY DEPT VISIT MOD MDM: CPT | Mod: GC

## 2019-08-16 NOTE — ED PROVIDER NOTE - PROGRESS NOTE DETAILS
Joseph Frankel PGY1: Patient no longer bleeding. Blood work remarkable for creatinine of 2.52. Based on previous blood work, seems that this is chronic issue. Counseled patient on importance of following up with PCP for creatinine. Was also counseled regarding return precautions.

## 2019-08-16 NOTE — ED PROVIDER NOTE - CLINICAL SUMMARY MEDICAL DECISION MAKING FREE TEXT BOX
73 year old male with CAD, (previous NSTEMI with stent placements), DM, HTN on Aspirin and Clopidogrel presenting for epistaxis. VS wnl. PE remarkable for clotted red blood in anterior septum of left turbinate. Most likely epistaxis secondary to digital trauma. No previous epistaxis. Has now stopped with pressure. Will observe for 1 hour and  patient on preventative measures. 73 year old male with CAD, (previous NSTEMI with stent placements), DM, HTN on Aspirin and Clopidogrel presenting for epistaxis. VS wnl. PE remarkable for clotted red blood in anterior septum of left turbinate. Most likely epistaxis secondary to digital trauma. No previous epistaxis. Has now stopped with pressure. Will observe for 1 hour and  patient on preventative measures.    Attending MD Rice: 74 yo male with PMH for cardiac stents on Plavix and ASA, this morning he pick his left nostril and it began to bleed for about 1h45m and stopped prior to evaluation.  On exam  blood in left nostril, no posterior bleeding noted.  No active bleeding at this time.  Plan: observe, labs, good instructions on no nose blowing or picking nose.

## 2019-08-16 NOTE — ED PROVIDER NOTE - NSFOLLOWUPINSTRUCTIONS_ED_ALL_ED_FT
You were seen in the Emergency Department for nose bleed.   1) Try and not blow your nose or insert anything into you nose for the rest of the day.    2) You can purchase saline spray to keep your nostrils moist  3) Your creatinine was elevated to 2.52. Please follow up with  your primary care physician in 1-3 days to discuss your creatinine and other results.   4) Return to the Emergency Department for worsening or persistent symptoms, and/or ANY NEW OR CONCERNING SYMPTOMS. If you have issues obtaining follow up, please call: [phone] to obtain a doctor or specialist who takes your insurance in your area.

## 2019-08-16 NOTE — ED PROVIDER NOTE - NSFOLLOWUPCLINICS_GEN_ALL_ED_FT
[facility] - Primary Care  Primary Care  [address] [place] [address]  Phone: [phone]  Fax:   Follow Up Time:

## 2019-08-16 NOTE — ED PROVIDER NOTE - NS ED ROS FT
Gen: Denies fever, weight loss  CV: Denies chest pain, palpitations  Skin: Denies rash, erythema, color changes  Resp: Denies SOB, cough  GI: Denies constipation, nausea, vomiting  Msk: Denies back pain, LE swelling, extremity pain  : Denies dysuria, increased frequency  Neuro: Denies LOC, weakness, seizures  Psych: Denies hx of psych, hallucinations

## 2019-08-16 NOTE — ED PROVIDER NOTE - OBJECTIVE STATEMENT
73 year old male with CAD, (previous NSTEMI with stent placements), DM, HTN on Aspirin and Clopidogrel presenting for epistaxis. Patient states that bleeding began following digital trauma to the septum of left nostril. Bleed began at 7AM this morning and was dripping until 8:45AM. Bleeding stopped with pressure alone around 8:45 AM. Never had this before.

## 2019-08-16 NOTE — ED ADULT NURSE NOTE - NSIMPLEMENTINTERV_GEN_ALL_ED
Implemented All Universal Safety Interventions:  [place] to call system. Call bell, personal items and telephone within reach. Instruct patient to call for assistance. Room bathroom lighting operational. Non-slip footwear when patient is off stretcher. Physically safe environment: no spills, clutter or unnecessary equipment. Stretcher in lowest position, wheels locked, appropriate side rails in place.

## 2019-08-16 NOTE — ED ADULT NURSE NOTE - OBJECTIVE STATEMENT
73 year old Male, PMH: DM, HTN, HLD. Patient comes in today complaining of nose bleed that started approximately 7am, when patient was in the shower and picked his nose. Nose started bleeding- on Clopidogrel. Upon arrival to the ED, patient withdrew cotton he placed in his nose at home, about 5 inches of clotted blood came out. Light intermittent blood dripping is seen coming left nostril. Denies feeling blood drinking down the back of his throat, denies frequent nosebleeds. No other complaints at this time. Wife at bedside. 73 year old Male, PMH: DM, HTN, HLD. Patient comes in today complaining of nose bleed that started 7am lasting approximately 1.5hrs, when patient was in the shower and picked his nose. Nose started bleeding- on Clopidogrel. Upon arrival to the ED, patient withdrew cotton he placed in his nose at home, about 5 inches of clotted blood came out. Light intermittent blood dripping is seen coming left nostril. Denies feeling blood dripping down the back of his throat, frequent nosebleeds, shortness of breath, chest pain, dizzienss. No other complaints at this time. Wife at bedside.

## 2019-08-16 NOTE — ED PROVIDER NOTE - PHYSICAL EXAMINATION
Gen: Alert and oriented. Lying comfortably in bed. Answering questions appropriately  HEENT: Clotted red blood on septum in anterior left turbinate. No new bleeding appreciated. No new or old bleeding appreciated in right turbinate. extra occular movements intact, mucous membranes moist  CV: Regular rate and rhythm, +S1/S2, no murmurs/rubs/gallops,   Resp: normal work of breathing  MSK: No open wounds, no bruising,    Neuro: A&Ox4, following commands, moving all four extremities spontaneously  Psych: appropriate mood

## 2019-08-16 NOTE — ED PROVIDER NOTE - ATTENDING CONTRIBUTION TO CARE
Attending MD Rice:  I personally have seen and examined this patient.  Resident note reviewed and agree on plan of care and except where noted.

## 2020-05-07 ENCOUNTER — APPOINTMENT (OUTPATIENT)
Dept: ELECTROPHYSIOLOGY | Facility: CLINIC | Age: 75
End: 2020-05-07

## 2020-12-10 NOTE — CONSULT NOTE ADULT - PROBLEM/RECOMMENDATION-1
DISPLAY PLAN FREE TEXT Albendazole Pregnancy And Lactation Text: This medication is Pregnancy Category C and it isn't known if it is safe during pregnancy. It is also excreted in breast milk.

## 2021-05-11 NOTE — H&P CARDIOLOGY - CONSTITUTIONAL DETAILS
Lawson    obese

## 2022-04-29 ENCOUNTER — TRANSCRIPTION ENCOUNTER (OUTPATIENT)
Age: 77
End: 2022-04-29

## 2022-05-02 ENCOUNTER — OUTPATIENT (OUTPATIENT)
Dept: OUTPATIENT SERVICES | Facility: HOSPITAL | Age: 77
LOS: 1 days | End: 2022-05-02

## 2022-05-02 ENCOUNTER — APPOINTMENT (OUTPATIENT)
Dept: DISASTER EMERGENCY | Facility: HOSPITAL | Age: 77
End: 2022-05-02

## 2022-05-02 VITALS
OXYGEN SATURATION: 95 % | HEART RATE: 63 BPM | DIASTOLIC BLOOD PRESSURE: 73 MMHG | SYSTOLIC BLOOD PRESSURE: 123 MMHG | TEMPERATURE: 98 F | RESPIRATION RATE: 18 BRPM

## 2022-05-02 VITALS
WEIGHT: 205.91 LBS | SYSTOLIC BLOOD PRESSURE: 144 MMHG | OXYGEN SATURATION: 98 % | RESPIRATION RATE: 18 BRPM | HEART RATE: 69 BPM | DIASTOLIC BLOOD PRESSURE: 78 MMHG | TEMPERATURE: 98 F | HEIGHT: 71 IN

## 2022-05-02 DIAGNOSIS — Z90.89 ACQUIRED ABSENCE OF OTHER ORGANS: Chronic | ICD-10-CM

## 2022-05-02 DIAGNOSIS — U07.1 COVID-19: ICD-10-CM

## 2022-05-02 RX ORDER — BEBTELOVIMAB 87.5 MG/ML
175 INJECTION, SOLUTION INTRAVENOUS ONCE
Refills: 0 | Status: COMPLETED | OUTPATIENT
Start: 2022-05-02 | End: 2022-05-02

## 2022-05-02 RX ADMIN — BEBTELOVIMAB 175 MILLIGRAM(S): 87.5 INJECTION, SOLUTION INTRAVENOUS at 10:38

## 2022-05-02 NOTE — CHART NOTE - NSCHARTNOTEFT_GEN_A_CORE
CC: Monoclonal Antibody Infusion/COVID 19 Positive  76y Male with PMH of HTN, HLD, T2DM and recent dx of COVID 19+ who presents today for elective Bebtelovimab. Patient has been screened and was deemed to be a candidate.    Symptoms/ Criteria: Cough, Cold, Sore throat.  Date of Symptom Onset:  4/28  Symptoms: Cough, Cold, Sore throat.  Date of Positive COVID PCR: 4/29  Risk Profile includes:  age >55     Vaccination Status: vaccinated & boosted Pfizer     PMHx:  No pertinent family history in first degree relatives    Family history of acute myocardial infarction of anterior wall (Father)    Infection due to severe acute respiratory syndrome coronavirus 2 (SARS-CoV-2)    Depression    CAD (Coronary Artery Disease)    DM (Diabetes Mellitus)    Hypertension    Hyperlipidemia    DM2 (diabetes mellitus, type 2)    CKD (chronic kidney disease), stage IV    Fracture of Humerus    History of tonsillectomy        Exam/findings:  T(C): 36.8 (05-02-22 @ 10:27), Max: 36.8 (05-02-22 @ 10:27)  HR: 69 (05-02-22 @ 10:27) (69 - 69)  BP: 144/78 (05-02-22 @ 10:27) (144/78 - 144/78)  RR: 18 (05-02-22 @ 10:27) (18 - 18)  SpO2: 98% (05-02-22 @ 10:27) (98% - 98%)    PE:   Appearance: NAD	  HEENT:  NC/AT  Cardiovascular:  No edema  Respiratory: no use of accessory muscles  Gastrointestinal:  non-distended   Skin: warm and dry  Neurologic: Non-focal  Extremities: Normal range of motion    ASSESSMENT:  Pt is COVID positive with mild to moderate symptoms who was referred for elective MAB (Bebtelovimab). Referred by Dr Heather Man     PLAN:  - MAB treatment explained to patient. I have reviewed the Bebtelovimab Emergency Use Authorization (EUA) and I have provided the patient or patient's caregiver with the following information:   1. FDA has authorized emergency use of Bebtelovimab to be administered for the treatment of mild to moderate COVID-19, which is not an FDA-approved biological product.   2. The patient or patient's caregiver has the option to accept or refuse administration of MAB.   3. The significant known and potential risks and benefits of Bebtelovimab and the extent to which such risks and benefits are unknown.  4. Information on available alternative treatments and risks and benefits of those alternatives.  - Patient verbalized understanding of plan and agrees to treatment. All questions answered.  - Consent for MAB obtained.   - 175mg of Bebtelovimab administered as a single intravenous injection over at least 30 seconds.   - Observe patient for one hour post medication administration and then if stable, discharge home with oupatient follow up as planned by PCP.      POST ASSESSMENT:   Patient completed MAB, and monitored x 1 hour post-infusion with no adverse reactions noted, remained hemodynamically stable.  - Patient tolerated infusion well; denies complaints of chest pain/SOB/dizziness/palpitations.   - VSS for discharge home.  - D/C instructions given/ fact sheet included.  - Patient was instructed to self-isolate and use infection control measures (e.g wear mask, isolate, social distance, avoid sharing personal items, clean and disinfect "high touch" surfaces, and frequent handwashing according to the CDC guidelines.   - The patient was informed on what symptoms to be aware of for the next couple of days, and if there are any issues to call the 24/7 clinical call center. Patient was instructed to follow up with primary care provider as needed.    DISCHARGE at __________

## 2022-12-20 ENCOUNTER — EMERGENCY (EMERGENCY)
Facility: HOSPITAL | Age: 77
LOS: 1 days | Discharge: HOME | End: 2022-12-20
Attending: EMERGENCY MEDICINE
Payer: MEDICARE

## 2022-12-20 VITALS
WEIGHT: 199.96 LBS | HEART RATE: 75 BPM | RESPIRATION RATE: 17 BRPM | DIASTOLIC BLOOD PRESSURE: 90 MMHG | TEMPERATURE: 97 F | OXYGEN SATURATION: 93 % | SYSTOLIC BLOOD PRESSURE: 162 MMHG | HEIGHT: 71 IN

## 2022-12-20 VITALS
OXYGEN SATURATION: 96 % | SYSTOLIC BLOOD PRESSURE: 143 MMHG | TEMPERATURE: 98 F | HEART RATE: 73 BPM | RESPIRATION RATE: 18 BRPM | DIASTOLIC BLOOD PRESSURE: 82 MMHG

## 2022-12-20 DIAGNOSIS — Z90.89 ACQUIRED ABSENCE OF OTHER ORGANS: Chronic | ICD-10-CM

## 2022-12-20 LAB
ALBUMIN SERPL ELPH-MCNC: 3.9 G/DL — SIGNIFICANT CHANGE UP (ref 3.3–5)
ALP SERPL-CCNC: 122 U/L — HIGH (ref 40–120)
ALT FLD-CCNC: 21 U/L — SIGNIFICANT CHANGE UP (ref 10–45)
ANION GAP SERPL CALC-SCNC: 14 MMOL/L — SIGNIFICANT CHANGE UP (ref 5–17)
APTT BLD: 35.6 SEC — HIGH (ref 27.5–35.5)
AST SERPL-CCNC: 19 U/L — SIGNIFICANT CHANGE UP (ref 10–40)
BASOPHILS # BLD AUTO: 0.03 K/UL — SIGNIFICANT CHANGE UP (ref 0–0.2)
BASOPHILS NFR BLD AUTO: 0.4 % — SIGNIFICANT CHANGE UP (ref 0–2)
BILIRUB SERPL-MCNC: 0.5 MG/DL — SIGNIFICANT CHANGE UP (ref 0.2–1.2)
BUN SERPL-MCNC: 86 MG/DL — HIGH (ref 7–23)
CALCIUM SERPL-MCNC: 9.1 MG/DL — SIGNIFICANT CHANGE UP (ref 8.4–10.5)
CHLORIDE SERPL-SCNC: 98 MMOL/L — SIGNIFICANT CHANGE UP (ref 96–108)
CO2 SERPL-SCNC: 19 MMOL/L — LOW (ref 22–31)
CREAT SERPL-MCNC: 3.43 MG/DL — HIGH (ref 0.5–1.3)
EGFR: 18 ML/MIN/1.73M2 — LOW
EOSINOPHIL # BLD AUTO: 0.15 K/UL — SIGNIFICANT CHANGE UP (ref 0–0.5)
EOSINOPHIL NFR BLD AUTO: 2.1 % — SIGNIFICANT CHANGE UP (ref 0–6)
GLUCOSE SERPL-MCNC: 264 MG/DL — HIGH (ref 70–99)
HCT VFR BLD CALC: 41.4 % — SIGNIFICANT CHANGE UP (ref 39–50)
HGB BLD-MCNC: 13.2 G/DL — SIGNIFICANT CHANGE UP (ref 13–17)
IMM GRANULOCYTES NFR BLD AUTO: 0.7 % — SIGNIFICANT CHANGE UP (ref 0–0.9)
INR BLD: 2.42 RATIO — HIGH (ref 0.88–1.16)
LYMPHOCYTES # BLD AUTO: 0.63 K/UL — LOW (ref 1–3.3)
LYMPHOCYTES # BLD AUTO: 8.7 % — LOW (ref 13–44)
MCHC RBC-ENTMCNC: 29.6 PG — SIGNIFICANT CHANGE UP (ref 27–34)
MCHC RBC-ENTMCNC: 31.9 GM/DL — LOW (ref 32–36)
MCV RBC AUTO: 92.8 FL — SIGNIFICANT CHANGE UP (ref 80–100)
MONOCYTES # BLD AUTO: 0.54 K/UL — SIGNIFICANT CHANGE UP (ref 0–0.9)
MONOCYTES NFR BLD AUTO: 7.5 % — SIGNIFICANT CHANGE UP (ref 2–14)
NEUTROPHILS # BLD AUTO: 5.81 K/UL — SIGNIFICANT CHANGE UP (ref 1.8–7.4)
NEUTROPHILS NFR BLD AUTO: 80.6 % — HIGH (ref 43–77)
NRBC # BLD: 0 /100 WBCS — SIGNIFICANT CHANGE UP (ref 0–0)
PLATELET # BLD AUTO: 238 K/UL — SIGNIFICANT CHANGE UP (ref 150–400)
POTASSIUM SERPL-MCNC: 5 MMOL/L — SIGNIFICANT CHANGE UP (ref 3.5–5.3)
POTASSIUM SERPL-SCNC: 5 MMOL/L — SIGNIFICANT CHANGE UP (ref 3.5–5.3)
PROT SERPL-MCNC: 6.8 G/DL — SIGNIFICANT CHANGE UP (ref 6–8.3)
PROTHROM AB SERPL-ACNC: 28.1 SEC — HIGH (ref 10.5–13.4)
RBC # BLD: 4.46 M/UL — SIGNIFICANT CHANGE UP (ref 4.2–5.8)
RBC # FLD: 13.8 % — SIGNIFICANT CHANGE UP (ref 10.3–14.5)
SODIUM SERPL-SCNC: 131 MMOL/L — LOW (ref 135–145)
WBC # BLD: 7.21 K/UL — SIGNIFICANT CHANGE UP (ref 3.8–10.5)
WBC # FLD AUTO: 7.21 K/UL — SIGNIFICANT CHANGE UP (ref 3.8–10.5)

## 2022-12-20 PROCEDURE — G1004: CPT

## 2022-12-20 PROCEDURE — 85610 PROTHROMBIN TIME: CPT

## 2022-12-20 PROCEDURE — 85730 THROMBOPLASTIN TIME PARTIAL: CPT

## 2022-12-20 PROCEDURE — 80053 COMPREHEN METABOLIC PANEL: CPT

## 2022-12-20 PROCEDURE — 70450 CT HEAD/BRAIN W/O DYE: CPT | Mod: 26,MG

## 2022-12-20 PROCEDURE — 99284 EMERGENCY DEPT VISIT MOD MDM: CPT | Mod: 25

## 2022-12-20 PROCEDURE — 99284 EMERGENCY DEPT VISIT MOD MDM: CPT | Mod: FS

## 2022-12-20 PROCEDURE — 70450 CT HEAD/BRAIN W/O DYE: CPT | Mod: MG

## 2022-12-20 PROCEDURE — 30901 CONTROL OF NOSEBLEED: CPT | Mod: LT

## 2022-12-20 PROCEDURE — 85025 COMPLETE CBC W/AUTO DIFF WBC: CPT

## 2022-12-20 NOTE — ED PROCEDURE NOTE - CPROC ED DIRECTIONAL
PD HPI UPPER EXT INJURY





- Stated complaint


Stated Complaint: FALL SHOULDER PX





- Chief complaint


Chief Complaint: Ext Problem





- History obtained from


History obtained from: Patient





- History of Present Illness


Location: Right, Shoulder


Type of injury: Fall


Where injury occurred: Street


Timing - onset: Today


Timing - duration: Minutes


Timing - details: Abrupt onset, Still present


Improved by: Rest, Immobilization


Worsened by: Moving, Palpating


Associated symptoms: No: Weakness, Numbness, Tingling


Contributing factors: No: Anticoagulated


Similar symptoms before: Has not had sx before


Recently seen: Not recently seen





- Additonal information


Additional information: 





42 y/o female was running with her dog the dog got in front of her and tripped 

her up and she landed on her knees and dislocated her right shoulder. The pain 

from the right shoulder dislocation is overwhelming. She received 10mg  

Morphine in the ambulance without relief. 





Review of Systems


Constitutional: denies: Fever, Chills


Eyes: denies: Decreased vision


Ears: denies: Ear pain


Nose: denies: Congestion


Throat: denies: Sore throat


Respiratory: denies: Cough


GI: denies: Vomiting


: denies: Dysuria


Skin: denies: Rash, Lesions


Musculoskeletal: reports: Extremity pain, Joint pain.  denies: Neck pain, Back 

pain


Neurologic: denies: Generalized weakness, Focal weakness, Numbness





PD PAST MEDICAL HISTORY





- Past Surgical History


Past Surgical History: Yes


General: Cholecystectomy, Appendectomy


/GYN:  section





- Present Medications


Home Medications: 


 Ambulatory Orders











 Medication  Instructions  Recorded  Confirmed


 


Epinephrine [Epipen 2-Vivek] 0.3 mg IJ ONCE PRN #1 unit 16 


 


Naproxen  17 


 


traMADol [Ultram] 50 mg PO Q6H PRN #15 tablet 17 














- Allergies


Allergies/Adverse Reactions: 


 Allergies











Allergy/AdvReac Type Severity Reaction Status Date / Time


 


acetaminophen [From Percocet] Allergy  Hallucinati Verified 17 07:13





   ons  


 


haloperidol [From Haldol] Allergy  Unknown Verified 16 19:00


 


haloperidol lactate * Allergy  Unknown Verified 16 19:00





[From Haldol]     


 


oxycodone HCl * Allergy  Hallucinati Verified 17 07:13





[From Percocet]   ons  














- Social History


Does the pt smoke?: No


Smoking Status: Never smoker





PD ED PE NORMAL





- Vitals


Vital signs reviewed: Yes (hypertensive )





- General


General: Alert and oriented X 3, Well developed/nourished, Other (appears to be 

in pain and favoring the right shoulder )





- HEENT


HEENT: Atraumatic, PERRL





- Neck


Neck: Supple, no meningeal sign





- Cardiac


Cardiac: RRR





- Respiratory


Respiratory: No respiratory distress





- Derm


Derm: Normal color, Warm and dry, No rash





- Extremities


Extremities: Other (There is deformity to the right shoulder consistent with a 

dislocation.  There are abrasions to the left knee without deformity to the 

knee itself. )





- Neuro


Neuro: No motor deficit, No sensory deficit





- Psych


Psych: Normal mood, Normal affect





Results





- Vitals


Vitals: 


 Vital Signs - 24 hr











  17





  07:09 07:45 07:54


 


Temperature 36.4 C L  


 


Heart Rate 56 L 63 


 


Respiratory 20 16 16





Rate   


 


Blood Pressure 132/100 H 136/87 H 119/85 H


 


O2 Saturation 94 100 96














  17





  07:57 08:00 08:04


 


Temperature   


 


Heart Rate 67 75 


 


Respiratory  14 14





Rate   


 


Blood Pressure 126/87 H  116/81 H


 


O2 Saturation 100 100 95














  17





  08:06 08:07 08:30


 


Temperature   


 


Heart Rate 64 66 67


 


Respiratory 16 16 16





Rate   


 


Blood Pressure 116/74 116/74 121/64


 


O2 Saturation 99 100 99








 Oxygen











O2 Source                      Room air


 


Oxygen Flow Rate               2

















- Rads (name of study)


  ** right shoulder


Radiology: Prelim report reviewed (Impression: Anterior dislocation of the 

shoulder.), EMP read indepedently, See rad report





  ** post reduction


Radiology: Prelim report reviewed (Impression: 1. Interval reduction of right 

shoulder dislocation now in anatomic alignment.), EMP read indepedently, See 

rad report





Procedures





- Reduction


Body part reduced: Right, Shoulder


Fracture or dislocation: Dislocation


Anesthesia: Conscious sedation, Dilaudid, Propofol


Shoulder reduction technique: Traction - counter tract


Reduction aftercare: NV intact, Xray confirms reduction, Sling, Patient 

tolerated well





- Procedural sedation


Sedation prep: Informed consent, Time out completed, Last meal (last night), PE 

performed, AHA 1 - healthy, IV O2 monitor, ET CO2 monitor, RT present


Sedation medications: dilaudid, propofol


Patient status during sedation: Responds to tactile, Vitals remained stable, 

Maintained airway, Recovered uneventfully


Sedation recovery: Recovered uneventfully, Back to baseline





PD MEDICAL DECISION MAKING





- ED course


Complexity details: considered differential, d/w patient


ED course: 





42 y/o otherwise healthy mother of 3 tripped over the dog on the morning jog 

and dislocated her right shoulder. She has some abrasions to the knees but is 

otherwise uninjured and her shoulder dislocation is reduced with traction and 

rotation with conscious sedation with dilaudid and propofol. She tolerated this 

well and is placed into a sling and swathe. 





Departure





- Departure


Disposition: 01 Home, Self Care


Clinical Impression: 


Shoulder dislocation


Qualifiers:


 Encounter type: initial encounter Laterality: right Qualified Code(s): 

S43.004A - Unspecified dislocation of right shoulder joint, initial encounter





Abrasion of knee, left


Qualifiers:


 Encounter type: initial encounter Qualified Code(s): S80.212A - Abrasion, left 

knee, initial encounter


Condition: Stable


Instructions:  ED Dislocation Shoulder Redu, ED Abrasion


Follow-Up: 


Willem De Souza MD [Primary Care Provider] - 


Prescriptions: 


traMADol [Ultram] 50 mg PO Q6H PRN #15 tablet


 PRN Reason: Pain


Comments: 


Today in the Emergency Department your blood pressure was elevated. This can 

happen from the stress of the visit itself, from a current illness or 

circumstance or from uncontrolled hypertension. If you take blood pressure 

medications take your usual mediations, have your blood pressure re-checked in 

an appropriate setting and follow up any  elevation with your primary care 

doctor. left

## 2022-12-20 NOTE — ED ADULT NURSE NOTE - CAS EDN DISCHARGE INTERVENTIONS
[facility] Medicine Services  INPATIENT PROGRESS NOTE    Length of Stay: 3  Date of Admission: 9/3/2019  Primary Care Physician: Manuel Lopez APRN    Subjective   Chief Complaint: No new complaints.    HPI:    Mando Hill is a 78 y.o. 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      Patient presented with a 2-3 day history of abdominal pain. Patient was on Bactrim for UTI outpatient. Patient endorsed nausea, vomiting, and abdominal pain. He has some residual deficits from previous CVA.    Patient is seen for follow-up today.  He remains on amiodarone infusion secondary to a run of ventricular tachycardia on 9/5/2019 that required cardioversion and CODE BLUE.  He he has not had further arrhythmia and remains on noninvasive respiratory therapy.      He is postoperative day #2 status post cystoscopy, left laser lithotripsy and J stent placement.    Review of Systems   Constitutional: Positive for activity change, appetite change and fatigue. Negative for chills, diaphoresis and fever.   HENT: Negative for trouble swallowing and voice change.    Eyes: Negative for photophobia and visual disturbance.   Respiratory: Negative for cough, choking, chest tightness, shortness of breath, wheezing and stridor.    Cardiovascular: Negative for chest pain, palpitations and leg swelling.   Gastrointestinal: Negative for abdominal distention, abdominal pain, blood in stool, constipation, diarrhea, nausea and vomiting.   Endocrine: Negative for cold intolerance, heat intolerance, polydipsia, polyphagia and polyuria.   Genitourinary: Negative for decreased urine volume, difficulty urinating, dysuria, enuresis, flank pain, frequency, hematuria and urgency.   Musculoskeletal: Positive for arthralgias and gait problem. Negative for myalgias, neck pain and neck  stiffness.   Skin: Negative for pallor, rash and wound.   Neurological: Positive for weakness. Negative for dizziness, tremors, seizures, syncope, facial asymmetry, speech difficulty, light-headedness, numbness and headaches.   Hematological: Does not bruise/bleed easily.   Psychiatric/Behavioral: Negative for agitation, behavioral problems and confusion.       Objective    Temp:  [97.4 °F (36.3 °C)-99.5 °F (37.5 °C)] 97.8 °F (36.6 °C)  Heart Rate:  [51-73] 53  Resp:  [12-29] 29  BP: ()/(39-93) 90/53    Physical Exam   Constitutional: He is oriented to person, place, and time. He appears well-developed and well-nourished. No distress.   HENT:   Head: Normocephalic and atraumatic.   Eyes: EOM are normal. Pupils are equal, round, and reactive to light. No scleral icterus.   Neck: Normal range of motion. Neck supple. No JVD present. No thyromegaly present.   Cardiovascular: Normal rate, regular rhythm and normal heart sounds. Exam reveals no gallop and no friction rub.   No murmur heard.  Pulmonary/Chest: Effort normal. Tachypnea noted. He has decreased breath sounds. He has no wheezes. He has no rales. He exhibits no tenderness.   Abdominal: Soft. Bowel sounds are normal. He exhibits no distension and no mass. There is no tenderness. There is no rebound and no guarding.   Musculoskeletal: He exhibits edema and deformity. He exhibits no tenderness.   Neurological: He is alert and oriented to person, place, and time. No cranial nerve deficit. He exhibits normal muscle tone. Coordination normal.   He has left-sided hemiplegia from previous CVA.   Skin: Skin is warm and dry. No rash noted. He is not diaphoretic. No erythema. No pallor.   Psychiatric: He has a normal mood and affect. His behavior is normal. Judgment and thought content normal.   Nursing note and vitals reviewed.        Medication Review:    Current Facility-Administered Medications:   •  amiodarone (NEXTERONE) 360 mg/200 mL (1.8 mg/mL) infusion, 1  mg/min, Intravenous, Continuous, Hardy Fisher MD, Stopped at 09/05/19 1447  •  amiodarone (NEXTERONE) 360 mg/200 mL (1.8 mg/mL) infusion, 0.5 mg/min, Intravenous, Continuous, Hardy Fisher MD, Last Rate: 16.67 mL/hr at 09/06/19 0209, 0.5 mg/min at 09/06/19 0209  •  atorvastatin (LIPITOR) tablet 10 mg, 10 mg, Oral, Daily, Hardy Fisher MD, 10 mg at 09/06/19 0851  •  baclofen (LIORESAL) tablet 10 mg, 10 mg, Oral, Q12H, Hardy Fisher MD, 10 mg at 09/06/19 0849  •  famotidine (PEPCID) injection 20 mg, 20 mg, Intravenous, Daily, Hardy Fisher MD, 20 mg at 09/06/19 0851  •  finasteride (PROSCAR) tablet 5 mg, 5 mg, Oral, Daily, Hardy Fisher MD, 5 mg at 09/06/19 0851  •  fluticasone (FLONASE) 50 MCG/ACT nasal spray 2 spray, 2 spray, Each Nare, Daily, Orion Hernandez MD, 2 spray at 09/06/19 0853  •  HYDROcodone-acetaminophen (NORCO)  MG per tablet 1 tablet, 1 tablet, Oral, Q6H PRN, Hardy Fisher MD, 1 tablet at 09/06/19 0849  •  ipratropium-albuterol (DUO-NEB) nebulizer solution 3 mL, 3 mL, Nebulization, 4x Daily - RT, Orion Hernandez MD, 3 mL at 09/06/19 0734  •  methylPREDNISolone sodium succinate (SOLU-Medrol) injection 60 mg, 60 mg, Intravenous, Q8H, Hardy Fisher MD, 60 mg at 09/06/19 0545  •  morphine injection 2 mg, 2 mg, Intravenous, Q4H PRN, Hardy Fisher MD  •  nebivolol (BYSTOLIC) tablet 20 mg, 20 mg, Oral, Nightly, Hardy Fisher MD  •  norepinephrine (LEVOPHED) 8 mg/250 mL (32 mcg/mL) in sodium chloride 0.9% infusion (premix), 0.02-0.3 mcg/kg/min, Intravenous, Titrated, Hardy Fisher MD  •  Pharmacy to Dose Zosyn, , Does not apply, Continuous PRN, Spenser England MD  •  phenytoin (DILANTIN) 100 mg in sodium chloride 0.9 % 100 mL IVPB, 100 mg, Intravenous, Q12H, Hardy Fisher MD, Last Rate: 200 mL/hr at 09/06/19 1004, 100 mg at 09/06/19 1004  •  piperacillin-tazobactam (ZOSYN) 3.375 g/100 mL 0.9% NS IVPB (mbp), 3.375 g,  Intravenous, Q8H, Spenser England MD, 3.375 g at 09/06/19 0850  •  rivaroxaban (XARELTO) tablet 15 mg, 15 mg, Oral, Nightly, Hardy Fisher MD  •  sodium chloride 0.9 % flush 10 mL, 10 mL, Intravenous, PRN, Derrick Whiteside MD  •  sodium chloride 0.9 % flush 10 mL, 10 mL, Intravenous, Q12H, Spenser England MD, 10 mL at 09/06/19 0853  •  sodium chloride 0.9 % flush 10 mL, 10 mL, Intravenous, PRN, Spenser England MD  •  sodium chloride 0.9 % flush 10 mL, 10 mL, Intravenous, Q12H, Hardy Fisher MD, 10 mL at 09/06/19 0853  •  sodium chloride 0.9 % flush 10 mL, 10 mL, Intravenous, Q12H, Hardy Fisher MD, 10 mL at 09/06/19 0850  •  sodium chloride 0.9 % flush 10 mL, 10 mL, Intravenous, PRN, Hardy Fisher MD  •  sodium chloride 0.9 % infusion, 100 mL/hr, Intravenous, Continuous, Hardy Fisher MD, Last Rate: 100 mL/hr at 09/06/19 0415, 100 mL/hr at 09/06/19 0415  •  tamsulosin (FLOMAX) 24 hr capsule 0.4 mg, 0.4 mg, Oral, Nightly, Hardy Fisher MD, 0.4 mg at 09/05/19 2001    Results Review:  I have reviewed the labs, radiology results, and diagnostic studies.    Laboratory Data:   Results from last 7 days   Lab Units 09/06/19  0421 09/05/19  0046 09/04/19  0524 09/03/19  0724   SODIUM mmol/L 140 143 139 140   POTASSIUM mmol/L 5.3* 4.0 4.2 4.4   CHLORIDE mmol/L 100 102 99 94*   CO2 mmol/L 23.0 29.0 30.0* 31.0*   BUN mg/dL 41* 30* 30* 34*   CREATININE mg/dL 3.03* 2.48* 2.43* 2.23*   GLUCOSE mg/dL 181* 122* 132* 123*   CALCIUM mg/dL 8.3* 8.5* 8.4* 8.7   BILIRUBIN mg/dL  --  0.6  --  0.7   ALK PHOS U/L  --  95  --  111   ALT (SGPT) U/L  --  9  --  9   AST (SGOT) U/L  --  11  --  12   ANION GAP mmol/L 17.0* 12.0 10.0 15.0     Estimated Creatinine Clearance: 22.1 mL/min (A) (by C-G formula based on SCr of 3.03 mg/dL (H)).  Results from last 7 days   Lab Units 09/05/19  0046   MAGNESIUM mg/dL 1.9         Results from last 7 days   Lab Units 09/06/19  0421 09/05/19  0046 09/04/19  0524 09/03/19  0724    WBC 10*3/mm3 22.37* 16.55* 18.15* 15.35*   HEMOGLOBIN g/dL 13.6 12.7* 12.9* 13.2   HEMATOCRIT % 44.3 41.0 39.6 39.8   PLATELETS 10*3/mm3 242 198 192 205           Culture Data:   Blood Culture   Date Value Ref Range Status   09/03/2019 No growth at 2 days  Preliminary   09/03/2019 No growth at 2 days  Preliminary     No results found for: URINECX  No results found for: RESPCX  No results found for: WOUNDCX  No results found for: STOOLCX  No components found for: BODYFLD    Radiology Data:   Imaging Results (last 24 hours)     Procedure Component Value Units Date/Time    US Guided Vascular Access [[id number]] Resulted:  09/05/19 1123     Updated:  09/05/19 1123    Narrative:       This procedure was auto-finalized with no dictation required.          I have reviewed the patient's current medications.     Assessment/Plan     Hospital Problem List:  Principal Problem:    Ureterolithiasis: Patient is status post cystoscopy, laser lithotripsy and J stent placement.  Urologist is following.    Acute cystitis/polynephritis: Continue IV antibiotics.  Blood cultures have shown no growth and urine culture is pending.  Leukocytosis is reactive and will be monitored.     BPH (benign prostatic hyperplasia): Continue Flomax and Proscar.    Acute on chronic kidney injury: Baseline creatinine is between 1-1.2.  Creatinine has increased to 3.03 today.  Continue IV hydration, avoid nephrotoxins, monitor renal function, check renal ultrasound and consult nephrologist.    Acute on chronic respiratory failure with hypercapnia and hypoxia: Continue noninvasive respiratory therapy, bronchodilators and steroids.  Repeat ABG in a.m.    Status post CODE BLUE secondary to ventricular arrhythmia: Continue amiodarone infusion.  Elevated troponin is likely reactive and is trending downwards.  Cardiologist has been consulted.  Echocardiogram done on 9/5/2019 showed:  · The left ventricular cavity is mild-to-moderately dilated.  · Estimated EF =  24%.  · Left ventricular systolic function is severely decreased.  · Left ventricular diastolic dysfunction (grade II) consistent with pseudonormalization.  · Mild mitral valve regurgitation is present  · Moderate tricuspid valve regurgitation is present.  · The tricuspid valve is grossly normal. Moderate tricuspid valve regurgitation is present. Estimated right ventricular systolic pressure from tricuspid regurgitation is moderately elevated (45-55 mmHg). Moderate pulmonary hypertension is present.     History of atrial fibrillation: Patient is in normal sinus rhythm.  Continue rate control.  Anticoagulation is currently on hold in anticipation of further urological procedure.     History of CVA with left-sided hemiplegia: Continue statin and antiplatelet therapy.    Hyperglycemia: This may be steroid-induced as patient is not a known in diabetic.  Begin Accu-Cheks and sliding scale insulin.    Hyperkalemia: We will give a dose of Kayexalate and monitor BMP.    Seizure disorder: Continue Dilantin.    Chronic pain syndrome continue pain control.    Nutrition: Continue diet as recommended by speech-language pathologist.      Continue GI prophylaxis.      Discharge Planning: In progress.    Hardy Fisher MD   09/06/19   11:20 AM         IV discontinued, cath removed intact

## 2022-12-20 NOTE — ED PROVIDER NOTE - OBJECTIVE STATEMENT
77-year-old male with past medical history of hypertension diabetes CAD with stents recent admission to [facility] diagnosed with DVT and PE, started on Eliquis with discharge 12/16 presents to the emergency department with complaint of epistaxis this morning.  Patient states that the day after discharge he had an episode where he felt slightly dizzy and went to sit down on his bed but sat too close to the edge and fell to the floor hitting his head without LOC.  He reports he sustained abrasions to his head but was able to get up.  Since he reports he has felt some fatigue and shortness of breath which is what initially brought him to [facility].  He has been taking Eliquis as prescribed and states this morning had a spontaneous nosebleed from the left nare which lasted a few hours and stopped with pressure upon arrival to the ED.  He denies recurrent head injury, trauma to the nose, chest pain, abdominal pain, nausea, vomiting, diarrhea, numbness, weakness.

## 2022-12-20 NOTE — ED ADULT NURSE NOTE - OBJECTIVE STATEMENT
9023 77 yr old WM brought to ER by wife for further eval and tx of epistaxis. Onset this morning. Hospitalized 2 wks ago at [facility] for tx of Pulmonary embolism and bilat DVTs. On blood thinner currently. Also bumped his head on carpet at home 4 days ago. Scabs noted at front of scalp. Denies chest pain or palp + SOB when walking and dizziness. Color pale. Skin W&D. Fall risk precautions maintained. Denies fever, chills or cough. Dried blood noted at nares. No bleeding visible at present.

## 2022-12-20 NOTE — ED ADULT NURSE NOTE - NSICDXFAMILYHX_GEN_ALL_CORE_FT
FAMILY HISTORY:  Father  Still living? No  Family history of acute myocardial infarction of anterior wall, Age at diagnosis: Age Unknown

## 2022-12-20 NOTE — ED PROVIDER NOTE - ATTENDING APP SHARED VISIT CONTRIBUTION OF CARE
77-year-old male status post recent inpatient visit for bilateral DVTs and PE unprovoked, on anticoagulation, coming in with recurrent nosebleed left nares that resolved after about an hour of pressure intermittently by patient.  Patient currently denies any symptoms including no chest pain, shortness of breath, bleeding, or syncope.  On exam, patient appears well in no apparent distress, bilateral nares showing mucosal desiccation and dryness with some dried blood in the left nares, can see 1 or 2 areas of mucosal irritation in the Kiesselbach plexus.    MDM: 77-year-old male on anticoagulants with recurrent nosebleed, patient has had nosebleeds prior to the start of anticoagulation and usually gets these once or twice per season, is likely due to desiccation and mucosa in the winter months with the dry heat, advised patient to start applying Vaseline to the bilateral nares at least twice a day, we would pack the nose with Vaseline packing initially given that the bleeding is resolved with a high risk of recurrence.  Educated patient on reasons to return to the ED and management of the nosebleeds at home initially.  We will check labs including INR platelet count and to rule out anemia, and of note patient did fall and hit his head previous to this that is does not seem to be related to the nosebleed but will obtain a CT of the head to rule out intracranial hemorrhage.

## 2022-12-20 NOTE — ED PROVIDER NOTE - SHIFT CHANGE DETAILS
Attending MD Allison: 77M on AC for new DVT/PE, L ant nares bleed resolved without intervention, vaseline packing here, fell few days ago, pending CTH, Cr baseline and Hb baseline, if CT nonact, can follow up outpt

## 2022-12-20 NOTE — ED PROVIDER NOTE - PHYSICAL EXAMINATION
CONSTITUTIONAL: Patient is awake, alert and oriented x 3. Patient is well appearing and in no acute distress  HEAD: Abrasion to L frontal scalp without active bleeding, otherwise NCAT  EYES: PERRL b/l, EOMI  ENT: Airway patent, R Nasal mucosa clear, L nare with dried blood, no active bleeding. Mouth with normal mucosa. Throat with dried blood in oropharynx has no vesicles, no oropharyngeal exudates and uvula is midline  NECK: supple, FROM, no midline cervical ttp   LUNGS: CTA b/l, no wheezing or rales   HEART: RRR.+S1S2 no murmurs  ABDOMEN: Soft, non-distended, nttp,  no rebound or guarding  EXTREMITY: FROM upper and lower ext b/l  SKIN: with no rash or lesions  NEURO:  No focal deficits

## 2022-12-20 NOTE — ED PROVIDER NOTE - PATIENT PORTAL LINK FT
You can access the FollowMyHealth Patient Portal offered by [facility] by registering at the following website: [email or web address]. By joining [facility]’s FollowMyHealth portal, you will also be able to view your health information using other applications (apps) compatible with our system.

## 2022-12-20 NOTE — ED ADULT NURSE NOTE - NSICDXPASTMEDICALHX_GEN_ALL_CORE_FT
PAST MEDICAL HISTORY:  CAD (Coronary Artery Disease)     CKD (chronic kidney disease), stage IV     Depression     DM2 (diabetes mellitus, type 2)     Hyperlipidemia     Hypertension     Pulmonary embolism

## 2022-12-20 NOTE — ED PROVIDER NOTE - NS ED ATTENDING STATEMENT MOD
This was a shared visit with the EDILBERTO. I reviewed and verified the documentation and independently performed the documented:

## 2022-12-20 NOTE — ED PROVIDER NOTE - NSFOLLOWUPINSTRUCTIONS_ED_ALL_ED_FT
1. Please follow up with your Primary Care Doctor after discharge this week, bring a copy of your results to follow up appointment and please discuss blood work at follow up appointment     2. Please rest, stay hydrated and continue all at home medications as previously prescribed    3. You may remove the Vaseline gauze packing in your right nostril tomorrow afternoon. Recommend keeping nostrils moist with humidifiers at home as well as application of a small amount of Vaseline into both nostrils as advised     4. You will receive a call for any outstanding results or may call our ED Administration line at [phone] daily 11am-4pm to obtain results    5. Return to ED for any new or worsened symptoms of concern

## 2022-12-20 NOTE — ED PROVIDER NOTE - PROGRESS NOTE DETAILS
Pt Creatinine 3.43, last value viewable 2.52 from 2019. Discussed with pt wife who advised pt Cr during recent admission was around 3.4 and states they are aware of worsening renal function   Alpa Ruff PA-C Attending MD Allison: CT head nonactionable.  Labs reported at baseline.  No acute issues at  this time.  Lab and radiology tests reviewed with patient and wife.  Patient stable for discharge. Follow up instructions given, importance of follow up emphasized, return to ED parameters reviewed and patient verbalized understanding.  All questions answered, all concerns addressed.

## 2023-02-07 PROBLEM — I26.99 OTHER PULMONARY EMBOLISM WITHOUT ACUTE COR PULMONALE: Chronic | Status: ACTIVE | Noted: 2022-12-20

## 2023-02-13 ENCOUNTER — APPOINTMENT (OUTPATIENT)
Dept: PULMONOLOGY | Facility: CLINIC | Age: 78
End: 2023-02-13
Payer: MEDICARE

## 2023-02-13 ENCOUNTER — NON-APPOINTMENT (OUTPATIENT)
Age: 78
End: 2023-02-13

## 2023-02-13 VITALS
RESPIRATION RATE: 16 BRPM | BODY MASS INDEX: 28.84 KG/M2 | SYSTOLIC BLOOD PRESSURE: 105 MMHG | WEIGHT: 206 LBS | HEIGHT: 71 IN | DIASTOLIC BLOOD PRESSURE: 65 MMHG | OXYGEN SATURATION: 98 % | HEART RATE: 80 BPM | TEMPERATURE: 97.8 F

## 2023-02-13 DIAGNOSIS — I27.22 PULMONARY HYPERTENSION DUE TO LEFT HEART DISEASE: ICD-10-CM

## 2023-02-13 DIAGNOSIS — R06.02 SHORTNESS OF BREATH: ICD-10-CM

## 2023-02-13 DIAGNOSIS — I42.9 CARDIOMYOPATHY, UNSPECIFIED: ICD-10-CM

## 2023-02-13 DIAGNOSIS — N18.9 CHRONIC KIDNEY DISEASE, UNSPECIFIED: ICD-10-CM

## 2023-02-13 PROCEDURE — 36415 COLL VENOUS BLD VENIPUNCTURE: CPT

## 2023-02-13 PROCEDURE — 99215 OFFICE O/P EST HI 40 MIN: CPT | Mod: 25

## 2023-02-13 NOTE — HISTORY OF PRESENT ILLNESS
[TextBox_4] : This letter  is regarding your patient  who  attended pulmonary out patient office today.  I have reviewed  patient's  past history, social history, family history and medication list. I also  reviewed nurse practitioners/ and fellows  notes and assessment and agree with it.  \par The patient was referred by Dr.Carol Arriola\par \par 78 yo w/CAD w/stent (2019) PPM, H/O MI  SECONDARY  pulm htn [ WHO GROUP II ]  , DM, DVT/PE (on eliquis) Diagnose AT [facility] DEC 2022  HOSPITAL ADMISSION ---, CRF ( renal Dr Herrera)\par referred for 2nd opinion for pulm htn (pulm Dr Jf Anton)\par On sildenafil 40mg tid per cardiology Dr Steven Goldberg/ DR ANTON \par recent \par \par ------No history of , fever, chills , rigors, chest pain, or hemoptysis. Questionable history of Raynaud's phenomenon. No h/o significant weight loss in last few months. No history of liver dysfunction , collagen vascular disorder or chronic thromboembolic disease. I would classify the patient's dyspnea as WHO  FUNCTIONAL CLASS II--------\par \par ----Echo  date------DR GOLDBERG\par ----Pft date---------DR ANTON\par ----CATH  date---12/9/2021-------[facility]   LVEDP 18, RA17, RV 78/16, PA  86/30 MEAN PA 49. CO 4/71\par CT SCAN -N/A \par \par

## 2023-02-13 NOTE — REVIEW OF SYSTEMS
[Dyspnea] : dyspnea [SOB on Exertion] : sob on exertion [Negative] : Endocrine [TextBox_44] : as per hpi

## 2023-02-13 NOTE — DISCUSSION/SUMMARY
[FreeTextEntry1] : ---Assessment plan----------The patient has been referred here for further opinion regarding pulmonary problem,\par \par 76 yo w/CAD w/stent (2019) PPM, H/O MI  SECONDARY  pulm htn [ WHO GROUP II ]  , DM, DVT/PE (on eliquis) Diagnose AT [facility] DEC 2022  HOSPITAL ADMISSION ---, CRF ( renal Dr Herrera)\par referred for 2nd opinion for pulm htn (pulm Dr Jf Anton)\par On sildenafil 40mg tid per cardiology Dr Steven Goldberg/ DR ANTON \par \par 1 SECONDARY  PULM HTN --WHO GROUP II --IN SETTING OF AD  ---CATH SHOWS ELEVATED LV DIASTOLIS PRESSURE-----NEEDS TO KEEP HIM EUVOLEIMC--WILL OBTAIN ECHO REPORT FROM DR GOLDBERG---NEEDS  F/U WITH CARDIOLOGY ------ he has been on sildenafil which not clear to me if he is getting benefit from that medications considering he has group 2 pulmonary hypertension-----------\par 2  PE ---ON ELIQUIS\par 3  CRF --- last serum creatinine is more than 3 mg/DL I discussed with Dr. Herrera patient will hold diuretics for now we will follow-up with Dr. Hill in the next 24 hours---\par 4 REPEAT PFT-DR ANTON\par 5  CONSIDER CARDIAC REHAB\par 6 NEEDS SLEEP STUDY\par \par D/W THE PT AND WIFE IN DETAILS\par \par Patient has follow-up appointment with DrRusty Prior to seeing his nephrologist his cardiologist and also Dr. Feliciano who is his primary pulmonologist----we will try to obtain records from from Dr. Feliciano and will come back for review in 6 weeks time\par Thanks for allowing  me to participate  in the care of this patient.  Patient at this time  will follow  the above mentioned recommendations and return back for follow up visit. If you have any questions  I can be reached  at # [phone] (office).\par \par Keli Ortiz MD, [facility] \par Director, Pulmonary Hypertension Program \par [facility] \par Division of Pulmonary, Critical Care and Sleep Medicine \par  Professor of Medicine \par [facility]\par \par BUD Bustos-[facility]\par

## 2023-02-13 NOTE — PHYSICAL EXAM
[No Acute Distress] : no acute distress [IV] : Mallampati Class: IV [Normal Appearance] : normal appearance [No Neck Mass] : no neck mass [Normal Rate/Rhythm] : normal rate/rhythm [Normal S1, S2] : normal s1, s2 [No Murmurs] : no murmurs [No Resp Distress] : no resp distress [Clear to Auscultation Bilaterally] : clear to auscultation bilaterally [No Abnormalities] : no abnormalities [Benign] : benign [Normal Gait] : normal gait [No Clubbing] : no clubbing [No Cyanosis] : no cyanosis [FROM] : FROM [1+ Pitting] : 1+ pitting [Normal Color/ Pigmentation] : normal color/ pigmentation [No Focal Deficits] : no focal deficits [Oriented x3] : oriented x3 [Normal Affect] : normal affect

## 2023-02-13 NOTE — REASON FOR VISIT
[Initial] : an initial visit [Pulmonary Hypertension] : pulmonary hypertension [Shortness of Breath] : shortness of breath

## 2023-02-14 ENCOUNTER — NON-APPOINTMENT (OUTPATIENT)
Age: 78
End: 2023-02-14

## 2023-02-14 LAB
25(OH)D3 SERPL-MCNC: 57.2 NG/ML
A1AT SERPL-MCNC: 161 MG/DL
ALBUMIN SERPL ELPH-MCNC: 4 G/DL
ALP BLD-CCNC: 147 U/L
ALT SERPL-CCNC: 27 U/L
ANION GAP SERPL CALC-SCNC: 14 MMOL/L
AST SERPL-CCNC: 25 U/L
BASOPHILS # BLD AUTO: 0.05 K/UL
BASOPHILS NFR BLD AUTO: 0.9 %
BILIRUB SERPL-MCNC: 0.4 MG/DL
BUN SERPL-MCNC: 47 MG/DL
CALCIUM SERPL-MCNC: 8.9 MG/DL
CALCIUM SERPL-MCNC: 8.9 MG/DL
CHLORIDE SERPL-SCNC: 101 MMOL/L
CO2 SERPL-SCNC: 20 MMOL/L
COVID-19 NUCLEOCAPSID  GAM ANTIBODY INTERPRETATION: POSITIVE
COVID-19 SPIKE DOMAIN ANTIBODY INTERPRETATION: POSITIVE
CREAT SERPL-MCNC: 2.9 MG/DL
CRP SERPL-MCNC: 5 MG/L
DEPRECATED KAPPA LC FREE/LAMBDA SER: 1.83 RATIO
EGFR: 22 ML/MIN/1.73M2
ENA SCL70 IGG SER IA-ACNC: <0.2 AL
ENA SS-A AB SER IA-ACNC: <0.2 AL
ENA SS-B AB SER IA-ACNC: <0.2 AL
EOSINOPHIL # BLD AUTO: 0.14 K/UL
EOSINOPHIL NFR BLD AUTO: 2.5 %
ERYTHROCYTE [SEDIMENTATION RATE] IN BLOOD BY WESTERGREN METHOD: 20 MM/HR
ESTIMATED AVERAGE GLUCOSE: 166 MG/DL
FERRITIN SERPL-MCNC: 25 NG/ML
FOLATE RBC-MCNC: 2359 NG/ML
GLUCOSE SERPL-MCNC: 194 MG/DL
HBA1C MFR BLD HPLC: 7.4 %
HCT VFR BLD CALC: 37.9 %
HCT VFR BLD CALC: 37.9 %
HCYS SERPL-MCNC: 20.8 UMOL/L
HGB BLD-MCNC: 12.3 G/DL
IGA SER QL IEP: 67 MG/DL
IGG SER QL IEP: 770 MG/DL
IGM SER QL IEP: 69 MG/DL
IMM GRANULOCYTES NFR BLD AUTO: 0.4 %
INR PPP: 1.63 RATIO
KAPPA LC CSF-MCNC: 3.77 MG/DL
KAPPA LC SERPL-MCNC: 6.89 MG/DL
LYMPHOCYTES # BLD AUTO: 0.56 K/UL
LYMPHOCYTES NFR BLD AUTO: 10 %
MAN DIFF?: NORMAL
MCHC RBC-ENTMCNC: 30.6 PG
MCHC RBC-ENTMCNC: 32.5 GM/DL
MCV RBC AUTO: 94.3 FL
MONOCYTES # BLD AUTO: 0.48 K/UL
MONOCYTES NFR BLD AUTO: 8.6 %
NEUTROPHILS # BLD AUTO: 4.34 K/UL
NEUTROPHILS NFR BLD AUTO: 77.6 %
NT-PROBNP SERPL-MCNC: 5926 PG/ML
PARATHYROID HORMONE INTACT: 316 PG/ML
PHOSPHATE SERPL-MCNC: 4.9 MG/DL
PLATELET # BLD AUTO: 210 K/UL
POTASSIUM SERPL-SCNC: 4.4 MMOL/L
PROT SERPL-MCNC: 5.8 G/DL
PT BLD: 19.2 SEC
RBC # BLD: 4.02 M/UL
RBC # FLD: 15.3 %
RHEUMATOID FACT SER QL: <10 IU/ML
SARS-COV-2 AB SERPL IA-ACNC: >250 U/ML
SARS-COV-2 AB SERPL QL IA: 49.5 INDEX
SODIUM SERPL-SCNC: 136 MMOL/L
TOTAL IGE SMQN RAST: 6 KU/L
TSH SERPL-ACNC: 2.31 UIU/ML
URATE SERPL-MCNC: 7.8 MG/DL
VIT B12 SERPL-MCNC: 800 PG/ML
WBC # FLD AUTO: 5.59 K/UL

## 2023-02-15 LAB
ANACR T: NEGATIVE
CCP AB SER IA-ACNC: <8 UNITS
RF+CCP IGG SER-IMP: NEGATIVE

## 2023-02-16 LAB
CARDIOLIPIN AB SER IA-ACNC: NEGATIVE
M TB IFN-G BLD-IMP: NEGATIVE
QUANTIFERON TB PLUS MITOGEN MINUS NIL: 0.93 IU/ML
QUANTIFERON TB PLUS NIL: 0.02 IU/ML
QUANTIFERON TB PLUS TB1 MINUS NIL: 0 IU/ML
QUANTIFERON TB PLUS TB2 MINUS NIL: 0 IU/ML

## 2023-02-17 ENCOUNTER — APPOINTMENT (OUTPATIENT)
Dept: PULMONOLOGY | Facility: CLINIC | Age: 78
End: 2023-02-17

## 2023-02-17 LAB
ALBUMIN MFR SERPL ELPH: 61.3 %
ALBUMIN SERPL-MCNC: 3.6 G/DL
ALBUMIN/GLOB SERPL: 1.6 RATIO
ALPHA1 GLOB MFR SERPL ELPH: 5.4 %
ALPHA1 GLOB SERPL ELPH-MCNC: 0.3 G/DL
ALPHA2 GLOB MFR SERPL ELPH: 11.5 %
ALPHA2 GLOB SERPL ELPH-MCNC: 0.7 G/DL
B-GLOBULIN MFR SERPL ELPH: 10.1 %
B-GLOBULIN SERPL ELPH-MCNC: 0.6 G/DL
GAMMA GLOB FLD ELPH-MCNC: 0.7 G/DL
GAMMA GLOB MFR SERPL ELPH: 11.7 %
INTERPRETATION SERPL IEP-IMP: NORMAL
PROT SERPL-MCNC: 5.8 G/DL
PROT SERPL-MCNC: 5.8 G/DL

## 2023-05-31 ENCOUNTER — EMERGENCY (EMERGENCY)
Facility: HOSPITAL | Age: 78
LOS: 1 days | Discharge: HOME | End: 2023-05-31
Attending: EMERGENCY MEDICINE
Payer: MEDICARE

## 2023-05-31 VITALS
SYSTOLIC BLOOD PRESSURE: 107 MMHG | DIASTOLIC BLOOD PRESSURE: 67 MMHG | HEART RATE: 70 BPM | TEMPERATURE: 97 F | RESPIRATION RATE: 16 BRPM | OXYGEN SATURATION: 95 %

## 2023-05-31 VITALS
HEIGHT: 71 IN | OXYGEN SATURATION: 97 % | DIASTOLIC BLOOD PRESSURE: 87 MMHG | SYSTOLIC BLOOD PRESSURE: 138 MMHG | TEMPERATURE: 98 F | HEART RATE: 64 BPM | WEIGHT: 199.08 LBS | RESPIRATION RATE: 18 BRPM

## 2023-05-31 DIAGNOSIS — Z90.89 ACQUIRED ABSENCE OF OTHER ORGANS: Chronic | ICD-10-CM

## 2023-05-31 PROCEDURE — 99283 EMERGENCY DEPT VISIT LOW MDM: CPT | Mod: FS,25

## 2023-05-31 PROCEDURE — 12011 RPR F/E/E/N/L/M 2.5 CM/<: CPT

## 2023-05-31 PROCEDURE — 99282 EMERGENCY DEPT VISIT SF MDM: CPT | Mod: 25

## 2023-05-31 RX ORDER — TRANEXAMIC ACID 100 MG/ML
5 INJECTION, SOLUTION INTRAVENOUS ONCE
Refills: 0 | Status: COMPLETED | OUTPATIENT
Start: 2023-05-31 | End: 2023-05-31

## 2023-05-31 RX ADMIN — TRANEXAMIC ACID 5 MILLILITER(S): 100 INJECTION, SOLUTION INTRAVENOUS at 22:52

## 2023-05-31 NOTE — ED PROCEDURE NOTE - ATTENDING APP SHARED VISIT CONTRIBUTION OF CARE
I, EM Attending, Reginald Huber was available for consultation and supervision for the procedure that was performed by the Resident Physician or EDILBERTO.

## 2023-05-31 NOTE — ED PROCEDURE NOTE - CPROC ED LACERATION CLEANSED1
08/20/22 1600   Group Therapy Session   Group Attendance attended group session   Time Session Began 1330   Time Session Ended 1420   Total Time patient participated (minutes) 50   Total # Attendees 4   Group Type expressive therapy   Group Topic Covered balanced lifestyle   Patient Response/Contribution cooperative with task   Patient Response Detail See participated in OT clinic, where he initiated a chosen project, followed through with plan, and asked for support with supplies as needed. Talkative and expansive. Discussed his passion for foreign languages.       cleansed

## 2023-05-31 NOTE — ED PROVIDER NOTE - ATTENDING APP SHARED VISIT CONTRIBUTION OF CARE
Reginald Huber MD:   I personally saw the patient and performed a substantive portion of the visit including all aspects of the medical decision making.    HPI: 77-year-old male with history of hypertension, diabetes, CAD with stents, on aspirin, DVT/PE on Eliquis, who presents with bleeding from the right ear.  Patient states that he woke up in the morning with bleeding from the right ear, he believes he may have scratched it while he was sleeping.  Patient denies any injury, fall.     ROS: Headache, numbness, weakness, hearing changes, bleeding from inside the ear, lightheadedness, dizziness.     Exam:  GEN: In no acute distress, AAOx3  HENT: NCAT, no stridor, right ear with punctate site of bleeding over the superior aspect of the helix, no hemotympanum, normal hearing bilaterally  EYES: No icterus  RESP: No respiratory distress  CV: No tachycardia, normal perfusion  NEURO: No focal neuro deficits in all 4 extremities    MDM: Likely due to superficial wound with persistent bleeding as patient on aspirin and Eliquis.  No sign of deeper site of bleeding, no auricular hematoma.  Will place TXA and pressure, and provide wound care.    Differential includes but is not limited to: See above    Patient with new problems requiring additional work-up and treatment, following orders: see above  Discussed case with: N/A  Obtained and reviewed external records: Provider note from 12/20/2022  Additional history obtained from: Wife at bedside  Chronic conditions and social determinants of health affecting care: see above Reginald Huber MD:   I personally saw the patient and performed a substantive portion of the visit including all aspects of the medical decision making.    HPI: 77-year-old male with history of hypertension, diabetes, CAD with stents, on aspirin, DVT/PE on Eliquis, who presents with bleeding from the right ear.  Patient states that he woke up in the morning with bleeding from the right ear, he believes he may have scratched it while he was sleeping.  Patient denies any injury, fall.     ROS: Headache, numbness, weakness, hearing changes, bleeding from inside the ear, lightheadedness, dizziness.     Exam:  GEN: In no acute distress, AAOx3  HENT: NCAT, no stridor, right ear with punctate site of bleeding over the superior aspect of the helix, no hemotympanum, normal hearing bilaterally  EYES: No icterus  RESP: No respiratory distress  CV: No tachycardia, normal perfusion  NEURO: No focal neuro deficits in all 4 extremities    MDM: Likely due to superficial wound with persistent bleeding as patient on aspirin and Eliquis.  No sign of deeper site of bleeding, no auricular hematoma.  Will place TXA and pressure, and provide wound care.    Differential includes but is not limited to: See above    Wound care provided, reassess 30 minutes after procedure, hemostasis continued, stable for discharge with close follow-up and strict return precautions. Discussed the indications and side-effects of applicable medications. The patient has been informed of all concerning signs and symptoms to return to Emergency Department, the necessity to follow up with PMD within 2 days was explained, or to return to the ED if unable to follow-up appropriately, and the patient reports understanding of above with capacity and insight.    Patient with new problems requiring additional work-up and treatment, following orders: see above  Discussed case with: N/A  Obtained and reviewed external records: Provider note from 12/20/2022  Additional history obtained from: Wife at bedside  Chronic conditions and social determinants of health affecting care: see above

## 2023-05-31 NOTE — ED PROVIDER NOTE - NSFOLLOWUPINSTRUCTIONS_ED_ALL_ED_FT
Thank you for visiting our Emergency Department, it has been a pleasure taking part in your healthcare. Please follow up with your primary doctor within x48 hours.    Your discharge diagnosis is: bleeding from ear    Return precautions to the Emergency Department include but are not limited to: unrelenting nausea, vomiting, fever, chills, chest pain, shortness of breath, dizziness, abdominal pain, worsening pain, syncope, blood in urine or stool, headache that doesn't resolve, numbness or tingling, loss of sensation, loss of motor function, or any other concerning symptoms.     do not get wound wet for 48 hrs, then you may get it wet- do not use soap or lotion to area - Leave glue on  ear. It will dissolve in the next few days.      If bleeding starts again, apply direct pressure to ear for 20 min.

## 2023-05-31 NOTE — ED ADULT NURSE NOTE - OBJECTIVE STATEMENT
77 y.o. male coming in from home via private car for right ear bleeding. pt states that he had a scab on the top of his right ear's cartilage that he thinks he might have scratched it in the middle of the night. pt states he woke up to a "lot of blood" on his pillow case. PMH PE on eliquis and aspirin. A&Ox3, vss, no active bleeding from site, no other complaints at this time.

## 2023-05-31 NOTE — ED PROVIDER NOTE - PATIENT PORTAL LINK FT
You can access the FollowMyHealth Patient Portal offered by [facility] by registering at the following website: [email or web address]. By joining [facility]’s FollowMyHealth portal, you will also be able to view your health information using other applications (apps) compatible with our system.

## 2023-05-31 NOTE — ED ADULT NURSE NOTE - NSFALLUNIVINTERV_ED_ALL_ED
Bed/Stretcher in lowest position, wheels locked, appropriate side rails in place/Call bell, personal items and telephone in reach/Instruct patient to call for assistance before getting out of bed/chair/stretcher/Non-slip footwear applied when patient is off stretcher/[place] to call system/Physically safe environment - no spills, clutter or unnecessary equipment/Purposeful proactive rounding/Room/bathroom lighting operational, light cord in reach

## 2023-05-31 NOTE — ED PROCEDURE NOTE - CPROC ED TIME OUT STATEMENT1
[de-identified] : DOS: 06/18/2021\par Procedure: Left Revision Carpal Tunnel Release w/Extensive Tenosynovectomy and Hypothenar Fat Flap [de-identified] : Patient presents to the office today for postop visit after a Left Revision Carpal Tunnel Release w/Extensive Tenosynovectomy and Hypothenar Fat Flap. She tolerated the procedure well and her postop pain is well controlled. She denies fevers or chills. [de-identified] : Skin is intact with no erythema or ecchymosis. The incision is well-healing with no signs of active infection or drainage. Fingers mobile with mild stiffness. Patient able to flex and make a fist when hand is flat, but unable to flex small finger when hand is upright. Wrist range of motion fully intact. Sensation grossly intact and capillary refill is brisk.  [de-identified] : No xray images were obtained at this visit.  [de-identified] : Patient is POD 14 s/p Left Revision Carpal Tunnel Release w/Extensive Tenosynovectomy and Hypothenar Fat Flap [de-identified] : Patient doing well. She may begin returning to activities as tolerated. She should keep the incision open to air and keep clean and dry. She should continue to work on wrist and digit range of motion to prevent stiffness. She will follow back up as needed. The patient is in agreement with this plan and appreciative of her care. “Patient's name, , procedure and correct site were confirmed during the Outlook Timeout.”

## 2023-06-08 NOTE — ED ADULT NURSE NOTE - TEMPLATE
reviewed                        Neuro/Psych:   (+) depression/anxiety             GI/Hepatic/Renal:   (+) GERD:,           Endo/Other: Negative Endo/Other ROS                    Abdominal:             Vascular: negative vascular ROS. Other Findings: Abdominal exam deferred          Anesthesia Plan      MAC     ASA 2       Induction: intravenous. Anesthetic plan and risks discussed with patient. Plan discussed with CRNA.     Attending anesthesiologist reviewed and agrees with Rob Martinez MD   6/8/2023 General

## 2023-09-25 ENCOUNTER — NON-APPOINTMENT (OUTPATIENT)
Age: 78
End: 2023-09-25

## 2023-09-28 ENCOUNTER — OUTPATIENT (OUTPATIENT)
Dept: OUTPATIENT SERVICES | Facility: HOSPITAL | Age: 78
LOS: 1 days | End: 2023-09-28
Payer: MEDICARE

## 2023-09-28 ENCOUNTER — TRANSCRIPTION ENCOUNTER (OUTPATIENT)
Age: 78
End: 2023-09-28

## 2023-09-28 VITALS
WEIGHT: 201.94 LBS | OXYGEN SATURATION: 94 % | HEART RATE: 69 BPM | SYSTOLIC BLOOD PRESSURE: 156 MMHG | TEMPERATURE: 98 F | HEIGHT: 71 IN | DIASTOLIC BLOOD PRESSURE: 73 MMHG | RESPIRATION RATE: 18 BRPM

## 2023-09-28 VITALS
HEART RATE: 62 BPM | SYSTOLIC BLOOD PRESSURE: 132 MMHG | DIASTOLIC BLOOD PRESSURE: 68 MMHG | RESPIRATION RATE: 18 BRPM | OXYGEN SATURATION: 94 %

## 2023-09-28 DIAGNOSIS — Z90.89 ACQUIRED ABSENCE OF OTHER ORGANS: Chronic | ICD-10-CM

## 2023-09-28 DIAGNOSIS — I27.20 PULMONARY HYPERTENSION, UNSPECIFIED: ICD-10-CM

## 2023-09-28 LAB
ANION GAP SERPL CALC-SCNC: 15 MMOL/L — SIGNIFICANT CHANGE UP (ref 5–17)
BUN SERPL-MCNC: 62 MG/DL — HIGH (ref 7–23)
CALCIUM SERPL-MCNC: 9.3 MG/DL — SIGNIFICANT CHANGE UP (ref 8.4–10.5)
CHLORIDE SERPL-SCNC: 98 MMOL/L — SIGNIFICANT CHANGE UP (ref 96–108)
CO2 SERPL-SCNC: 19 MMOL/L — LOW (ref 22–31)
CREAT SERPL-MCNC: 2.96 MG/DL — HIGH (ref 0.5–1.3)
EGFR: 21 ML/MIN/1.73M2 — LOW
GLUCOSE BLDC GLUCOMTR-MCNC: 90 MG/DL — SIGNIFICANT CHANGE UP (ref 70–99)
GLUCOSE SERPL-MCNC: 89 MG/DL — SIGNIFICANT CHANGE UP (ref 70–99)
HCT VFR BLD CALC: 47.1 % — SIGNIFICANT CHANGE UP (ref 39–50)
HGB BLD-MCNC: 15.5 G/DL — SIGNIFICANT CHANGE UP (ref 13–17)
MCHC RBC-ENTMCNC: 32.6 PG — SIGNIFICANT CHANGE UP (ref 27–34)
MCHC RBC-ENTMCNC: 32.9 GM/DL — SIGNIFICANT CHANGE UP (ref 32–36)
MCV RBC AUTO: 98.9 FL — SIGNIFICANT CHANGE UP (ref 80–100)
NRBC # BLD: 0 /100 WBCS — SIGNIFICANT CHANGE UP (ref 0–0)
PLATELET # BLD AUTO: 179 K/UL — SIGNIFICANT CHANGE UP (ref 150–400)
POTASSIUM SERPL-MCNC: 6.1 MMOL/L — HIGH (ref 3.5–5.3)
POTASSIUM SERPL-SCNC: 6.1 MMOL/L — HIGH (ref 3.5–5.3)
RBC # BLD: 4.76 M/UL — SIGNIFICANT CHANGE UP (ref 4.2–5.8)
RBC # FLD: 15.8 % — HIGH (ref 10.3–14.5)
SODIUM SERPL-SCNC: 132 MMOL/L — LOW (ref 135–145)
WBC # BLD: 5.29 K/UL — SIGNIFICANT CHANGE UP (ref 3.8–10.5)
WBC # FLD AUTO: 5.29 K/UL — SIGNIFICANT CHANGE UP (ref 3.8–10.5)

## 2023-09-28 PROCEDURE — 93005 ELECTROCARDIOGRAM TRACING: CPT

## 2023-09-28 PROCEDURE — C1887: CPT

## 2023-09-28 PROCEDURE — 82803 BLOOD GASES ANY COMBINATION: CPT

## 2023-09-28 PROCEDURE — 80048 BASIC METABOLIC PNL TOTAL CA: CPT

## 2023-09-28 PROCEDURE — 93010 ELECTROCARDIOGRAM REPORT: CPT

## 2023-09-28 PROCEDURE — 82962 GLUCOSE BLOOD TEST: CPT

## 2023-09-28 PROCEDURE — 93451 RIGHT HEART CATH: CPT

## 2023-09-28 PROCEDURE — C1894: CPT

## 2023-09-28 PROCEDURE — C1769: CPT

## 2023-09-28 PROCEDURE — 85027 COMPLETE CBC AUTOMATED: CPT

## 2023-09-28 RX ORDER — SODIUM CHLORIDE 9 MG/ML
10 INJECTION INTRAMUSCULAR; INTRAVENOUS; SUBCUTANEOUS
Refills: 0 | Status: DISCONTINUED | OUTPATIENT
Start: 2023-09-28 | End: 2023-10-12

## 2023-09-28 RX ORDER — APIXABAN 2.5 MG/1
1 TABLET, FILM COATED ORAL
Qty: 0 | Refills: 0 | DISCHARGE

## 2023-09-28 RX ORDER — FEXOFENADINE HCL 30 MG
1 TABLET ORAL
Qty: 0 | Refills: 0 | DISCHARGE

## 2023-09-28 RX ORDER — ERGOCALCIFEROL 1.25 MG/1
1 CAPSULE ORAL
Qty: 0 | Refills: 0 | DISCHARGE

## 2023-09-28 RX ORDER — PIOGLITAZONE HYDROCHLORIDE 15 MG/1
1 TABLET ORAL
Qty: 0 | Refills: 0 | DISCHARGE

## 2023-09-28 RX ORDER — MULTIVIT-MIN/FERROUS GLUCONATE 9 MG/15 ML
1 LIQUID (ML) ORAL
Qty: 0 | Refills: 0 | DISCHARGE

## 2023-09-28 RX ORDER — INSULIN GLARGINE 100 [IU]/ML
14 INJECTION, SOLUTION SUBCUTANEOUS
Qty: 0 | Refills: 0 | DISCHARGE

## 2023-09-28 RX ORDER — ERGOCALCIFEROL 1.25 MG/1
1 CAPSULE ORAL
Refills: 0 | DISCHARGE

## 2023-09-28 RX ORDER — ASPIRIN/CALCIUM CARB/MAGNESIUM 324 MG
1 TABLET ORAL
Qty: 0 | Refills: 0 | DISCHARGE

## 2023-09-28 RX ORDER — FUROSEMIDE 40 MG
40 TABLET ORAL ONCE
Refills: 0 | Status: COMPLETED | OUTPATIENT
Start: 2023-09-28 | End: 2023-09-28

## 2023-09-28 RX ADMIN — Medication 40 MILLIGRAM(S): at 15:38

## 2023-09-28 NOTE — ASU DISCHARGE PLAN (ADULT/PEDIATRIC) - CARE PROVIDER_API CALL
Jose Singleton  Adv Heart [facility] Cardio  [address] [place] [address]  Phone: [phone]  Fax: [phone]  Follow Up Time:    Goldberg, Richard Steven  Family Medicine  Established Patient  Follow Up Time:     Viral Wakefield  Interventional Cardiology  Established Patient  Follow Up Time:

## 2023-09-28 NOTE — ASU PATIENT PROFILE, ADULT - FALL HARM RISK - HARM RISK INTERVENTIONS

## 2023-09-28 NOTE — ASU DISCHARGE PLAN (ADULT/PEDIATRIC) - PROVIDER TOKENS
PROVIDER:[TOKEN:[[id number]]] PROVIDER:[TOKEN:[[id number]],ESTABLISHEDPATIENT:[T]],PROVIDER:[TOKEN:[[id number]],ESTABLISHEDPATIENT:[T]]

## 2023-09-28 NOTE — CHART NOTE - NSCHARTNOTEFT_GEN_A_CORE
79 y/o male s/p RHC offering c/o wheezing.  Patient held his home dose of furosemide this AM for procedure.  Lungs with scattered end expiratory wheezes b/l, improved with rescue inhaler use.  VSS, O2 sats 94% on RA, no tachypnea.  EDP on RHC 20, case discussed with Dr. Lion Wren  -Lasix 40 IV x 1   -monitor and reassess  - discharge home today if condition remains stable    Heather De La Cruz NP

## 2023-09-28 NOTE — ASU DISCHARGE PLAN (ADULT/PEDIATRIC) - ASU DC SPECIAL INSTRUCTIONSFT
Wound Care:   the day AFTER your procedure remove bandage GENTLY, and clean using  mild soap and gentle warm, water stream, pat dry. leave OPEN to air. YOU MAY SHOWER   DO NOT apply lotions, creams, ointments, powder, perfumes to your incision site  DO NOT SOAK your site for 1 week ( no baths, no pools, no tubs, etc...)  Check  your groin and /or wrist daily.A small amount of bruising, and soarness are normal    ACTIVITY: for 24 hours   - DO NOT DRIVE  - DO NOT make any important decisions or sign legal documents   - DO NOT operate heavy machineries   - you may resume sexual activity in 48 hours, unless otherwise instructed by your cardiologist     If your procedure was done through the WRIST: for the NEXT 3DAYS:  - avoid pushing, pulling, with that affected wrist   - avoid repeated movement of that hand and wrist ( eg: typing, hammering)  - DO NOT LIFT anything more than 5 lbs     If your procedure was done through the GROIN: for the NEXT 5 DAYS  - Limit climbing stairs, DO NOT soak in bathtub or pool  - no strenous activities, pushing, pulling, straining  - Do not lift anything 10lbs or heavier     MEDICATION:   take your medications as explained ( see discharge paperwork)   If you received a STENT, you will be taking antiplatelet medications to KEEP YOUR STENT OPEN ( eg: Aspirin, Plavix, Brilinta, Effient, etc).  Take as prescribed DO NOT STOP taking them without consulting with your cardiologist first.     Follow heart healthy diet recommended by your doctor, , if you smoke STOP SMOKING ( may call [phone] for center of tobacco control if you need assistance)     CALL your doctor to make appointment in 2 WEEKS     ***CALL YOUR DOCTOR***  if you experience: fever, chills, body aches, or severe pain, swelling, redness, heat or yellow discharge at incision site  If you experience Bleeding or excruciating pain at the procedural site, swelling ( golf ball size) at your procedural site  If you experience CHEST PAIN  If you experience extremity numbness, tingling, temperature change ( of your procedural site)   If you are unable to reach your doctor, you may contact:   -Cardiology Office at [facility] at [phone] or   - [facility] [phone]  - [facility] [phone] Wound Care:   the day AFTER your procedure remove bandage GENTLY, and clean using  mild soap and gentle warm, water stream, pat dry. leave OPEN to air. YOU MAY SHOWER   DO NOT apply lotions, creams, ointments, powder, perfumes to your incision site  DO NOT SOAK your site for 1 week ( no baths, no pools, no tubs, etc...)  Check  your groin and /or wrist daily. A small amount of bruising, and soreness are normal    ACTIVITY: for 24 hours   - DO NOT DRIVE  - DO NOT make any important decisions or sign legal documents   - DO NOT operate heavy machineries   - you may resume sexual activity in 48 hours, unless otherwise instructed by your cardiologist     If your procedure was done through the WRIST: for the NEXT 3DAYS:  - avoid pushing, pulling, with that affected wrist   - avoid repeated movement of that hand and wrist ( eg: typing, hammering)  - DO NOT LIFT anything more than 5 lbs     If your procedure was done through the GROIN: for the NEXT 5 DAYS  - Limit climbing stairs, DO NOT soak in bathtub or pool  - no strenuous activities, pushing, pulling, straining  - Do not lift anything 10lbs or heavier     MEDICATION:   take your medications as explained ( see discharge paperwork)   If you received a STENT, you will be taking antiplatelet medications to KEEP YOUR STENT OPEN ( eg: Aspirin, Plavix, Brilinta, Effient, etc).  Take as prescribed DO NOT STOP taking them without consulting with your cardiologist first.     Follow heart healthy diet recommended by your doctor, , if you smoke STOP SMOKING ( may call [phone] for center of tobacco control if you need assistance)     CALL your doctor to make appointment in 2 WEEKS     ***CALL YOUR DOCTOR***  if you experience: fever, chills, body aches, or severe pain, swelling, redness, heat or yellow discharge at incision site  If you experience Bleeding or excruciating pain at the procedural site, swelling ( golf ball size) at your procedural site  If you experience CHEST PAIN  If you experience extremity numbness, tingling, temperature change ( of your procedural site)   If you are unable to reach your doctor, you may contact:   -Cardiology Office at [facility] at [phone] or   - [facility] [phone]  - [facility] [phone]

## 2023-09-28 NOTE — ASU DISCHARGE PLAN (ADULT/PEDIATRIC) - NS MD DC FALL RISK RISK
For information on Fall & Injury Prevention, visit: [email or web address] OR  [email or web address] OR  https://www.cdc.gov/steadi/patient.html

## 2023-10-02 LAB
HGB FLD-MCNC: 14.2 G/DL — SIGNIFICANT CHANGE UP (ref 12.6–17.4)
OXYHGB MFR BLDMV: 59 % — LOW (ref 90–95)
SAO2 % BLD: 59 % — LOW (ref 60–90)

## 2023-11-19 ENCOUNTER — INPATIENT (INPATIENT)
Facility: HOSPITAL | Age: 78
LOS: 2 days | Discharge: HOME HEALTH | DRG: 291 | End: 2023-11-22
Attending: INTERNAL MEDICINE | Admitting: INTERNAL MEDICINE
Payer: MEDICARE

## 2023-11-19 VITALS
HEIGHT: 71 IN | RESPIRATION RATE: 16 BRPM | DIASTOLIC BLOOD PRESSURE: 62 MMHG | HEART RATE: 65 BPM | TEMPERATURE: 98 F | SYSTOLIC BLOOD PRESSURE: 102 MMHG | WEIGHT: 214.07 LBS | OXYGEN SATURATION: 92 %

## 2023-11-19 DIAGNOSIS — I82.409 ACUTE EMBOLISM AND THROMBOSIS OF UNSPECIFIED DEEP VEINS OF UNSPECIFIED LOWER EXTREMITY: ICD-10-CM

## 2023-11-19 DIAGNOSIS — I50.9 HEART FAILURE, UNSPECIFIED: ICD-10-CM

## 2023-11-19 DIAGNOSIS — E11.9 TYPE 2 DIABETES MELLITUS WITHOUT COMPLICATIONS: ICD-10-CM

## 2023-11-19 DIAGNOSIS — I25.10 ATHEROSCLEROTIC HEART DISEASE OF NATIVE CORONARY ARTERY WITHOUT ANGINA PECTORIS: ICD-10-CM

## 2023-11-19 DIAGNOSIS — N18.4 CHRONIC KIDNEY DISEASE, STAGE 4 (SEVERE): ICD-10-CM

## 2023-11-19 DIAGNOSIS — I26.99 OTHER PULMONARY EMBOLISM WITHOUT ACUTE COR PULMONALE: ICD-10-CM

## 2023-11-19 DIAGNOSIS — I27.20 PULMONARY HYPERTENSION, UNSPECIFIED: ICD-10-CM

## 2023-11-19 DIAGNOSIS — R06.09 OTHER FORMS OF DYSPNEA: ICD-10-CM

## 2023-11-19 DIAGNOSIS — Z90.89 ACQUIRED ABSENCE OF OTHER ORGANS: Chronic | ICD-10-CM

## 2023-11-19 DIAGNOSIS — R55 SYNCOPE AND COLLAPSE: ICD-10-CM

## 2023-11-19 DIAGNOSIS — I10 ESSENTIAL (PRIMARY) HYPERTENSION: ICD-10-CM

## 2023-11-19 LAB
ALBUMIN SERPL ELPH-MCNC: 3.8 G/DL — SIGNIFICANT CHANGE UP (ref 3.3–5)
ALBUMIN SERPL ELPH-MCNC: 3.8 G/DL — SIGNIFICANT CHANGE UP (ref 3.3–5)
ALP SERPL-CCNC: 92 U/L — SIGNIFICANT CHANGE UP (ref 40–120)
ALP SERPL-CCNC: 92 U/L — SIGNIFICANT CHANGE UP (ref 40–120)
ALT FLD-CCNC: 30 U/L — SIGNIFICANT CHANGE UP (ref 10–45)
ALT FLD-CCNC: 30 U/L — SIGNIFICANT CHANGE UP (ref 10–45)
ANION GAP SERPL CALC-SCNC: 15 MMOL/L — SIGNIFICANT CHANGE UP (ref 5–17)
ANION GAP SERPL CALC-SCNC: 15 MMOL/L — SIGNIFICANT CHANGE UP (ref 5–17)
ANISOCYTOSIS BLD QL: SLIGHT — SIGNIFICANT CHANGE UP
ANISOCYTOSIS BLD QL: SLIGHT — SIGNIFICANT CHANGE UP
AST SERPL-CCNC: 26 U/L — SIGNIFICANT CHANGE UP (ref 10–40)
AST SERPL-CCNC: 26 U/L — SIGNIFICANT CHANGE UP (ref 10–40)
BASE EXCESS BLDV CALC-SCNC: -2.8 MMOL/L — LOW (ref -2–3)
BASE EXCESS BLDV CALC-SCNC: -2.8 MMOL/L — LOW (ref -2–3)
BASOPHILS # BLD AUTO: 0 K/UL — SIGNIFICANT CHANGE UP (ref 0–0.2)
BASOPHILS # BLD AUTO: 0 K/UL — SIGNIFICANT CHANGE UP (ref 0–0.2)
BASOPHILS NFR BLD AUTO: 0 % — SIGNIFICANT CHANGE UP (ref 0–2)
BASOPHILS NFR BLD AUTO: 0 % — SIGNIFICANT CHANGE UP (ref 0–2)
BILIRUB SERPL-MCNC: 0.7 MG/DL — SIGNIFICANT CHANGE UP (ref 0.2–1.2)
BILIRUB SERPL-MCNC: 0.7 MG/DL — SIGNIFICANT CHANGE UP (ref 0.2–1.2)
BUN SERPL-MCNC: 59 MG/DL — HIGH (ref 7–23)
BUN SERPL-MCNC: 59 MG/DL — HIGH (ref 7–23)
BURR CELLS BLD QL SMEAR: PRESENT — SIGNIFICANT CHANGE UP
BURR CELLS BLD QL SMEAR: PRESENT — SIGNIFICANT CHANGE UP
CA-I SERPL-SCNC: 1.16 MMOL/L — SIGNIFICANT CHANGE UP (ref 1.15–1.33)
CA-I SERPL-SCNC: 1.16 MMOL/L — SIGNIFICANT CHANGE UP (ref 1.15–1.33)
CALCIUM SERPL-MCNC: 8.8 MG/DL — SIGNIFICANT CHANGE UP (ref 8.4–10.5)
CALCIUM SERPL-MCNC: 8.8 MG/DL — SIGNIFICANT CHANGE UP (ref 8.4–10.5)
CHLORIDE BLDV-SCNC: 100 MMOL/L — SIGNIFICANT CHANGE UP (ref 96–108)
CHLORIDE BLDV-SCNC: 100 MMOL/L — SIGNIFICANT CHANGE UP (ref 96–108)
CHLORIDE SERPL-SCNC: 100 MMOL/L — SIGNIFICANT CHANGE UP (ref 96–108)
CHLORIDE SERPL-SCNC: 100 MMOL/L — SIGNIFICANT CHANGE UP (ref 96–108)
CO2 BLDV-SCNC: 25 MMOL/L — SIGNIFICANT CHANGE UP (ref 22–26)
CO2 BLDV-SCNC: 25 MMOL/L — SIGNIFICANT CHANGE UP (ref 22–26)
CO2 SERPL-SCNC: 20 MMOL/L — LOW (ref 22–31)
CO2 SERPL-SCNC: 20 MMOL/L — LOW (ref 22–31)
CREAT SERPL-MCNC: 3.43 MG/DL — HIGH (ref 0.5–1.3)
CREAT SERPL-MCNC: 3.43 MG/DL — HIGH (ref 0.5–1.3)
EGFR: 18 ML/MIN/1.73M2 — LOW
EGFR: 18 ML/MIN/1.73M2 — LOW
ELLIPTOCYTES BLD QL SMEAR: SLIGHT — SIGNIFICANT CHANGE UP
ELLIPTOCYTES BLD QL SMEAR: SLIGHT — SIGNIFICANT CHANGE UP
EOSINOPHIL # BLD AUTO: 0.05 K/UL — SIGNIFICANT CHANGE UP (ref 0–0.5)
EOSINOPHIL # BLD AUTO: 0.05 K/UL — SIGNIFICANT CHANGE UP (ref 0–0.5)
EOSINOPHIL NFR BLD AUTO: 0.9 % — SIGNIFICANT CHANGE UP (ref 0–6)
EOSINOPHIL NFR BLD AUTO: 0.9 % — SIGNIFICANT CHANGE UP (ref 0–6)
GAS PNL BLDV: 130 MMOL/L — LOW (ref 136–145)
GAS PNL BLDV: 130 MMOL/L — LOW (ref 136–145)
GAS PNL BLDV: SIGNIFICANT CHANGE UP
GLUCOSE BLDC GLUCOMTR-MCNC: 112 MG/DL — HIGH (ref 70–99)
GLUCOSE BLDC GLUCOMTR-MCNC: 112 MG/DL — HIGH (ref 70–99)
GLUCOSE BLDC GLUCOMTR-MCNC: 60 MG/DL — LOW (ref 70–99)
GLUCOSE BLDC GLUCOMTR-MCNC: 60 MG/DL — LOW (ref 70–99)
GLUCOSE BLDC GLUCOMTR-MCNC: 61 MG/DL — LOW (ref 70–99)
GLUCOSE BLDC GLUCOMTR-MCNC: 61 MG/DL — LOW (ref 70–99)
GLUCOSE BLDV-MCNC: 187 MG/DL — HIGH (ref 70–99)
GLUCOSE BLDV-MCNC: 187 MG/DL — HIGH (ref 70–99)
GLUCOSE SERPL-MCNC: 181 MG/DL — HIGH (ref 70–99)
GLUCOSE SERPL-MCNC: 181 MG/DL — HIGH (ref 70–99)
HCO3 BLDV-SCNC: 23 MMOL/L — SIGNIFICANT CHANGE UP (ref 22–29)
HCO3 BLDV-SCNC: 23 MMOL/L — SIGNIFICANT CHANGE UP (ref 22–29)
HCT VFR BLD CALC: 36.4 % — LOW (ref 39–50)
HCT VFR BLD CALC: 36.4 % — LOW (ref 39–50)
HCT VFR BLDA CALC: 37 % — LOW (ref 39–51)
HCT VFR BLDA CALC: 37 % — LOW (ref 39–51)
HGB BLD CALC-MCNC: 12.3 G/DL — LOW (ref 12.6–17.4)
HGB BLD CALC-MCNC: 12.3 G/DL — LOW (ref 12.6–17.4)
HGB BLD-MCNC: 12.1 G/DL — LOW (ref 13–17)
HGB BLD-MCNC: 12.1 G/DL — LOW (ref 13–17)
LACTATE BLDV-MCNC: 1.1 MMOL/L — SIGNIFICANT CHANGE UP (ref 0.5–2)
LACTATE BLDV-MCNC: 1.1 MMOL/L — SIGNIFICANT CHANGE UP (ref 0.5–2)
LYMPHOCYTES # BLD AUTO: 0.23 K/UL — LOW (ref 1–3.3)
LYMPHOCYTES # BLD AUTO: 0.23 K/UL — LOW (ref 1–3.3)
LYMPHOCYTES # BLD AUTO: 4.4 % — LOW (ref 13–44)
LYMPHOCYTES # BLD AUTO: 4.4 % — LOW (ref 13–44)
MACROCYTES BLD QL: SLIGHT — SIGNIFICANT CHANGE UP
MACROCYTES BLD QL: SLIGHT — SIGNIFICANT CHANGE UP
MANUAL SMEAR VERIFICATION: SIGNIFICANT CHANGE UP
MANUAL SMEAR VERIFICATION: SIGNIFICANT CHANGE UP
MCHC RBC-ENTMCNC: 33.2 GM/DL — SIGNIFICANT CHANGE UP (ref 32–36)
MCHC RBC-ENTMCNC: 33.2 GM/DL — SIGNIFICANT CHANGE UP (ref 32–36)
MCHC RBC-ENTMCNC: 34.3 PG — HIGH (ref 27–34)
MCHC RBC-ENTMCNC: 34.3 PG — HIGH (ref 27–34)
MCV RBC AUTO: 103.1 FL — HIGH (ref 80–100)
MCV RBC AUTO: 103.1 FL — HIGH (ref 80–100)
MONOCYTES # BLD AUTO: 0.46 K/UL — SIGNIFICANT CHANGE UP (ref 0–0.9)
MONOCYTES # BLD AUTO: 0.46 K/UL — SIGNIFICANT CHANGE UP (ref 0–0.9)
MONOCYTES NFR BLD AUTO: 8.8 % — SIGNIFICANT CHANGE UP (ref 2–14)
MONOCYTES NFR BLD AUTO: 8.8 % — SIGNIFICANT CHANGE UP (ref 2–14)
NEUTROPHILS # BLD AUTO: 4.48 K/UL — SIGNIFICANT CHANGE UP (ref 1.8–7.4)
NEUTROPHILS # BLD AUTO: 4.48 K/UL — SIGNIFICANT CHANGE UP (ref 1.8–7.4)
NEUTROPHILS NFR BLD AUTO: 85.9 % — HIGH (ref 43–77)
NEUTROPHILS NFR BLD AUTO: 85.9 % — HIGH (ref 43–77)
OVALOCYTES BLD QL SMEAR: SLIGHT — SIGNIFICANT CHANGE UP
OVALOCYTES BLD QL SMEAR: SLIGHT — SIGNIFICANT CHANGE UP
PCO2 BLDV: 44 MMHG — SIGNIFICANT CHANGE UP (ref 42–55)
PCO2 BLDV: 44 MMHG — SIGNIFICANT CHANGE UP (ref 42–55)
PH BLDV: 7.33 — SIGNIFICANT CHANGE UP (ref 7.32–7.43)
PH BLDV: 7.33 — SIGNIFICANT CHANGE UP (ref 7.32–7.43)
PLAT MORPH BLD: NORMAL — SIGNIFICANT CHANGE UP
PLAT MORPH BLD: NORMAL — SIGNIFICANT CHANGE UP
PLATELET # BLD AUTO: 116 K/UL — LOW (ref 150–400)
PLATELET # BLD AUTO: 116 K/UL — LOW (ref 150–400)
PO2 BLDV: 33 MMHG — SIGNIFICANT CHANGE UP (ref 25–45)
PO2 BLDV: 33 MMHG — SIGNIFICANT CHANGE UP (ref 25–45)
POIKILOCYTOSIS BLD QL AUTO: SIGNIFICANT CHANGE UP
POIKILOCYTOSIS BLD QL AUTO: SIGNIFICANT CHANGE UP
POTASSIUM BLDV-SCNC: 4.4 MMOL/L — SIGNIFICANT CHANGE UP (ref 3.5–5.1)
POTASSIUM BLDV-SCNC: 4.4 MMOL/L — SIGNIFICANT CHANGE UP (ref 3.5–5.1)
POTASSIUM SERPL-MCNC: 4.2 MMOL/L — SIGNIFICANT CHANGE UP (ref 3.5–5.3)
POTASSIUM SERPL-MCNC: 4.2 MMOL/L — SIGNIFICANT CHANGE UP (ref 3.5–5.3)
POTASSIUM SERPL-SCNC: 4.2 MMOL/L — SIGNIFICANT CHANGE UP (ref 3.5–5.3)
POTASSIUM SERPL-SCNC: 4.2 MMOL/L — SIGNIFICANT CHANGE UP (ref 3.5–5.3)
PROT SERPL-MCNC: 5.9 G/DL — LOW (ref 6–8.3)
PROT SERPL-MCNC: 5.9 G/DL — LOW (ref 6–8.3)
RBC # BLD: 3.53 M/UL — LOW (ref 4.2–5.8)
RBC # BLD: 3.53 M/UL — LOW (ref 4.2–5.8)
RBC # FLD: 15.8 % — HIGH (ref 10.3–14.5)
RBC # FLD: 15.8 % — HIGH (ref 10.3–14.5)
RBC BLD AUTO: ABNORMAL
RBC BLD AUTO: ABNORMAL
SAO2 % BLDV: 37.1 % — LOW (ref 67–88)
SAO2 % BLDV: 37.1 % — LOW (ref 67–88)
SCHISTOCYTES BLD QL AUTO: SLIGHT — SIGNIFICANT CHANGE UP
SCHISTOCYTES BLD QL AUTO: SLIGHT — SIGNIFICANT CHANGE UP
SODIUM SERPL-SCNC: 135 MMOL/L — SIGNIFICANT CHANGE UP (ref 135–145)
SODIUM SERPL-SCNC: 135 MMOL/L — SIGNIFICANT CHANGE UP (ref 135–145)
TROPONIN T, HIGH SENSITIVITY RESULT: 85 NG/L — HIGH (ref 0–51)
TROPONIN T, HIGH SENSITIVITY RESULT: 85 NG/L — HIGH (ref 0–51)
TROPONIN T, HIGH SENSITIVITY RESULT: 90 NG/L — HIGH (ref 0–51)
TROPONIN T, HIGH SENSITIVITY RESULT: 90 NG/L — HIGH (ref 0–51)
WBC # BLD: 5.21 K/UL — SIGNIFICANT CHANGE UP (ref 3.8–10.5)
WBC # BLD: 5.21 K/UL — SIGNIFICANT CHANGE UP (ref 3.8–10.5)
WBC # FLD AUTO: 5.21 K/UL — SIGNIFICANT CHANGE UP (ref 3.8–10.5)
WBC # FLD AUTO: 5.21 K/UL — SIGNIFICANT CHANGE UP (ref 3.8–10.5)

## 2023-11-19 PROCEDURE — 73130 X-RAY EXAM OF HAND: CPT | Mod: 26,RT,77

## 2023-11-19 PROCEDURE — 73070 X-RAY EXAM OF ELBOW: CPT | Mod: 26,RT

## 2023-11-19 PROCEDURE — 70450 CT HEAD/BRAIN W/O DYE: CPT | Mod: 26,MA

## 2023-11-19 PROCEDURE — 99285 EMERGENCY DEPT VISIT HI MDM: CPT | Mod: FS

## 2023-11-19 PROCEDURE — 71045 X-RAY EXAM CHEST 1 VIEW: CPT | Mod: 26

## 2023-11-19 PROCEDURE — 73130 X-RAY EXAM OF HAND: CPT | Mod: 26,RT

## 2023-11-19 PROCEDURE — 99223 1ST HOSP IP/OBS HIGH 75: CPT

## 2023-11-19 RX ORDER — TAMSULOSIN HYDROCHLORIDE 0.4 MG/1
1 CAPSULE ORAL
Refills: 0 | DISCHARGE

## 2023-11-19 RX ORDER — ACETAMINOPHEN 500 MG
650 TABLET ORAL EVERY 6 HOURS
Refills: 0 | Status: DISCONTINUED | OUTPATIENT
Start: 2023-11-19 | End: 2023-11-19

## 2023-11-19 RX ORDER — ESCITALOPRAM OXALATE 10 MG/1
10 TABLET, FILM COATED ORAL DAILY
Refills: 0 | Status: DISCONTINUED | OUTPATIENT
Start: 2023-11-19 | End: 2023-11-22

## 2023-11-19 RX ORDER — FEXOFENADINE HCL 30 MG
1 TABLET ORAL
Refills: 0 | DISCHARGE

## 2023-11-19 RX ORDER — DEXTROSE 50 % IN WATER 50 %
25 SYRINGE (ML) INTRAVENOUS ONCE
Refills: 0 | Status: DISCONTINUED | OUTPATIENT
Start: 2023-11-19 | End: 2023-11-22

## 2023-11-19 RX ORDER — GLUCAGON INJECTION, SOLUTION 0.5 MG/.1ML
1 INJECTION, SOLUTION SUBCUTANEOUS ONCE
Refills: 0 | Status: DISCONTINUED | OUTPATIENT
Start: 2023-11-19 | End: 2023-11-22

## 2023-11-19 RX ORDER — TAMSULOSIN HYDROCHLORIDE 0.4 MG/1
0.4 CAPSULE ORAL AT BEDTIME
Refills: 0 | Status: DISCONTINUED | OUTPATIENT
Start: 2023-11-19 | End: 2023-11-22

## 2023-11-19 RX ORDER — ACETAMINOPHEN 500 MG
975 TABLET ORAL EVERY 6 HOURS
Refills: 0 | Status: DISCONTINUED | OUTPATIENT
Start: 2023-11-19 | End: 2023-11-22

## 2023-11-19 RX ORDER — FERROUS SULFATE 325(65) MG
1 TABLET ORAL
Refills: 0 | DISCHARGE

## 2023-11-19 RX ORDER — ASPIRIN/CALCIUM CARB/MAGNESIUM 324 MG
81 TABLET ORAL DAILY
Refills: 0 | Status: DISCONTINUED | OUTPATIENT
Start: 2023-11-19 | End: 2023-11-22

## 2023-11-19 RX ORDER — IPRATROPIUM BROMIDE 0.2 MG/ML
1 SOLUTION, NON-ORAL INHALATION EVERY 6 HOURS
Refills: 0 | Status: DISCONTINUED | OUTPATIENT
Start: 2023-11-19 | End: 2023-11-22

## 2023-11-19 RX ORDER — FERROUS SULFATE 325(65) MG
325 TABLET ORAL DAILY
Refills: 0 | Status: DISCONTINUED | OUTPATIENT
Start: 2023-11-19 | End: 2023-11-22

## 2023-11-19 RX ORDER — DIPHENHYDRAMINE HCL 50 MG
1 CAPSULE ORAL
Refills: 0 | DISCHARGE

## 2023-11-19 RX ORDER — LORATADINE 10 MG/1
10 TABLET ORAL DAILY
Refills: 0 | Status: DISCONTINUED | OUTPATIENT
Start: 2023-11-19 | End: 2023-11-22

## 2023-11-19 RX ORDER — EMPAGLIFLOZIN 10 MG/1
1 TABLET, FILM COATED ORAL
Refills: 0 | DISCHARGE

## 2023-11-19 RX ORDER — ATORVASTATIN CALCIUM 80 MG/1
40 TABLET, FILM COATED ORAL AT BEDTIME
Refills: 0 | Status: DISCONTINUED | OUTPATIENT
Start: 2023-11-19 | End: 2023-11-22

## 2023-11-19 RX ORDER — FERROUS FUMARATE 350(115)MG
1 TABLET ORAL
Refills: 0 | DISCHARGE

## 2023-11-19 RX ORDER — ROSUVASTATIN CALCIUM 5 MG/1
1 TABLET ORAL
Refills: 0 | DISCHARGE

## 2023-11-19 RX ORDER — INSULIN GLARGINE 100 [IU]/ML
42 INJECTION, SOLUTION SUBCUTANEOUS
Refills: 0 | DISCHARGE

## 2023-11-19 RX ORDER — FUROSEMIDE 40 MG
40 TABLET ORAL
Refills: 0 | Status: DISCONTINUED | OUTPATIENT
Start: 2023-11-19 | End: 2023-11-22

## 2023-11-19 RX ORDER — RANOLAZINE 500 MG/1
1000 TABLET, FILM COATED, EXTENDED RELEASE ORAL
Refills: 0 | DISCHARGE

## 2023-11-19 RX ORDER — AMBRISENTAN 10 MG/1
10 TABLET, FILM COATED ORAL DAILY
Refills: 0 | Status: DISCONTINUED | OUTPATIENT
Start: 2023-11-19 | End: 2023-11-22

## 2023-11-19 RX ORDER — CARVEDILOL PHOSPHATE 80 MG/1
1 CAPSULE, EXTENDED RELEASE ORAL
Refills: 0 | DISCHARGE

## 2023-11-19 RX ORDER — FUROSEMIDE 40 MG
1 TABLET ORAL
Refills: 0 | DISCHARGE

## 2023-11-19 RX ORDER — INSULIN GLARGINE 100 [IU]/ML
34 INJECTION, SOLUTION SUBCUTANEOUS AT BEDTIME
Refills: 0 | Status: DISCONTINUED | OUTPATIENT
Start: 2023-11-19 | End: 2023-11-22

## 2023-11-19 RX ORDER — INSULIN GLARGINE 100 [IU]/ML
17 INJECTION, SOLUTION SUBCUTANEOUS ONCE
Refills: 0 | Status: COMPLETED | OUTPATIENT
Start: 2023-11-19 | End: 2023-11-19

## 2023-11-19 RX ORDER — APIXABAN 2.5 MG/1
5 TABLET, FILM COATED ORAL
Refills: 0 | Status: DISCONTINUED | OUTPATIENT
Start: 2023-11-19 | End: 2023-11-22

## 2023-11-19 RX ORDER — ESCITALOPRAM OXALATE 10 MG/1
1 TABLET, FILM COATED ORAL
Refills: 0 | DISCHARGE

## 2023-11-19 RX ORDER — DIPHENHYDRAMINE HCL 50 MG
25 CAPSULE ORAL
Refills: 0 | Status: DISCONTINUED | OUTPATIENT
Start: 2023-11-19 | End: 2023-11-22

## 2023-11-19 RX ORDER — CARVEDILOL PHOSPHATE 80 MG/1
3.12 CAPSULE, EXTENDED RELEASE ORAL EVERY 12 HOURS
Refills: 0 | Status: DISCONTINUED | OUTPATIENT
Start: 2023-11-19 | End: 2023-11-21

## 2023-11-19 RX ORDER — TETANUS TOXOID, REDUCED DIPHTHERIA TOXOID AND ACELLULAR PERTUSSIS VACCINE, ADSORBED 5; 2.5; 8; 8; 2.5 [IU]/.5ML; [IU]/.5ML; UG/.5ML; UG/.5ML; UG/.5ML
0.5 SUSPENSION INTRAMUSCULAR ONCE
Refills: 0 | Status: COMPLETED | OUTPATIENT
Start: 2023-11-19 | End: 2023-11-19

## 2023-11-19 RX ORDER — AMBRISENTAN 10 MG/1
1 TABLET, FILM COATED ORAL
Refills: 0 | DISCHARGE

## 2023-11-19 RX ORDER — DEXTROSE 50 % IN WATER 50 %
12.5 SYRINGE (ML) INTRAVENOUS ONCE
Refills: 0 | Status: DISCONTINUED | OUTPATIENT
Start: 2023-11-19 | End: 2023-11-22

## 2023-11-19 RX ORDER — LANOLIN ALCOHOL/MO/W.PET/CERES
3 CREAM (GRAM) TOPICAL AT BEDTIME
Refills: 0 | Status: DISCONTINUED | OUTPATIENT
Start: 2023-11-19 | End: 2023-11-22

## 2023-11-19 RX ORDER — ASPIRIN/CALCIUM CARB/MAGNESIUM 324 MG
1 TABLET ORAL
Refills: 0 | DISCHARGE

## 2023-11-19 RX ORDER — INSULIN LISPRO 100/ML
VIAL (ML) SUBCUTANEOUS AT BEDTIME
Refills: 0 | Status: DISCONTINUED | OUTPATIENT
Start: 2023-11-19 | End: 2023-11-22

## 2023-11-19 RX ORDER — RANOLAZINE 500 MG/1
1000 TABLET, FILM COATED, EXTENDED RELEASE ORAL
Refills: 0 | Status: DISCONTINUED | OUTPATIENT
Start: 2023-11-19 | End: 2023-11-22

## 2023-11-19 RX ORDER — APIXABAN 2.5 MG/1
1 TABLET, FILM COATED ORAL
Qty: 0 | Refills: 0 | DISCHARGE

## 2023-11-19 RX ORDER — CHOLECALCIFEROL (VITAMIN D3) 125 MCG
1 CAPSULE ORAL
Refills: 0 | DISCHARGE

## 2023-11-19 RX ORDER — INSULIN LISPRO 100/ML
VIAL (ML) SUBCUTANEOUS
Refills: 0 | Status: DISCONTINUED | OUTPATIENT
Start: 2023-11-19 | End: 2023-11-22

## 2023-11-19 RX ORDER — IPRATROPIUM BROMIDE 0.2 MG/ML
2 SOLUTION, NON-ORAL INHALATION
Refills: 0 | DISCHARGE

## 2023-11-19 RX ORDER — DEXTROSE 50 % IN WATER 50 %
15 SYRINGE (ML) INTRAVENOUS ONCE
Refills: 0 | Status: DISCONTINUED | OUTPATIENT
Start: 2023-11-19 | End: 2023-11-22

## 2023-11-19 RX ORDER — RANOLAZINE 500 MG/1
1 TABLET, FILM COATED, EXTENDED RELEASE ORAL
Refills: 0 | DISCHARGE

## 2023-11-19 RX ORDER — CALCITRIOL 0.5 UG/1
0.25 CAPSULE ORAL DAILY
Refills: 0 | Status: DISCONTINUED | OUTPATIENT
Start: 2023-11-19 | End: 2023-11-22

## 2023-11-19 RX ADMIN — TAMSULOSIN HYDROCHLORIDE 0.4 MILLIGRAM(S): 0.4 CAPSULE ORAL at 22:22

## 2023-11-19 RX ADMIN — Medication 40 MILLIGRAM(S): at 19:26

## 2023-11-19 RX ADMIN — CARVEDILOL PHOSPHATE 3.12 MILLIGRAM(S): 80 CAPSULE, EXTENDED RELEASE ORAL at 19:25

## 2023-11-19 RX ADMIN — ATORVASTATIN CALCIUM 40 MILLIGRAM(S): 80 TABLET, FILM COATED ORAL at 22:22

## 2023-11-19 RX ADMIN — APIXABAN 5 MILLIGRAM(S): 2.5 TABLET, FILM COATED ORAL at 19:25

## 2023-11-19 RX ADMIN — TETANUS TOXOID, REDUCED DIPHTHERIA TOXOID AND ACELLULAR PERTUSSIS VACCINE, ADSORBED 0.5 MILLILITER(S): 5; 2.5; 8; 8; 2.5 SUSPENSION INTRAMUSCULAR at 17:18

## 2023-11-19 NOTE — H&P ADULT - NSHPPHYSICALEXAM_GEN_ALL_CORE
Vital Signs Last 24 Hrs  T(C): 36.7 (19 Nov 2023 16:22), Max: 36.7 (19 Nov 2023 16:22)  T(F): 98 (19 Nov 2023 16:22), Max: 98 (19 Nov 2023 16:22)  HR: 63 (19 Nov 2023 16:22) (63 - 65)  BP: 106/67 (19 Nov 2023 16:22) (102/62 - 106/67)  BP(mean): 80 (19 Nov 2023 16:22) (80 - 80)  RR: 18 (19 Nov 2023 16:22) (16 - 18)  SpO2: 98% (19 Nov 2023 16:22) (92% - 98%)    Parameters below as of 19 Nov 2023 16:22  Patient On (Oxygen Delivery Method): nasal cannula  O2 Flow (L/min): 2

## 2023-11-19 NOTE — ED PROVIDER NOTE - ATTENDING APP SHARED VISIT CONTRIBUTION OF CARE
pt is a 77 y/o male With history of PEs on Eliquis permanent pacemaker secondary to syncope in March CAD with stents prior CABG CHF presenting with brief LOC syncopal fall while standing in the parking lot states he was feeling short of breath which is his baseline due to his pulmonary hypertension does not use oxygen normally sats in the low 90s around 9192% as per patient.  Patient denies any URI symptoms mild chronic cough no fevers or chills or otherwise well-appearing with fall hit his head on AC abrasions to right forehead GCS 15, and some abrasions also to the elbow and shoulder full range of motion of both the ability to reduce films ordered of elbow full range of motion shoulder nontender.  In light of his syncopal episode EKG showing sinus rhythm EP eval with pacemaker check labs CT head.

## 2023-11-19 NOTE — ED ADULT NURSE NOTE - NSFALLHARMRISKINTERV_ED_ALL_ED

## 2023-11-19 NOTE — H&P ADULT - ASSESSMENT
78M hypertension, CAD s/p stent, HFrEF, severe pulmonary hypertension, diabetes, DVT/PE on Eliquis, CKD, s/p PPM ([employer]) presenting with fall in setting of acute on chronic dyspnea on exertion 2/2 HF/pHTN exacerbation.     # Syncope  - unclear etiology  - r/o arrythmia - device supposedly interrogated in ED with no arrhythmias according to patient, f/u official report  - monitor on telemetry  - f/u orthostatics  - repeat trop pending  - PT evaluation    # HFrEF exacerbation  Pt with HFrEF; last EF ?. Home lasix 20 mg daily  - GDMT: [X]beta blocker [] ARNI/ACE/ARB []MRA [X] SGLT-i  - lasix 40 IV BID  - echo  - maintain strict I&Os  - daily weights (standing if tolerating)    # R 4th finger dislocation  - s/p reduction in ED  - pain control tylenol 975 mg q6 PRN mild pain    # CAD s/p stent, last stent over a year ago  - c/w aspirin, statin    # pHTN  - c/w home sildenafil, ambrisentan    # T2DM  Home DM meds: jardiance, lantus 42u qhs  - Inpatient FS goal 100-180  - C/w Basal 34u qhs  - FS/ARELY qac/qhs  - CC diet  - F/u A1c     # DVT/PE  Hx of DVT/PE diagnosed 2022, has been on continual AC since then  - c/w eliquis    # CKD  - baseline Cr around 3?  - Avoid NSAIDs, ACEI/ARBS, RCA and nephrotoxins.  - Renally dose medications  - c/w calcitriol     # HTN  - c/w coreg    # Depression  - c/w lexapro    # ARIANNA  - c/w iron supplement    # BPH  - c/w flomax    # PPx  - DVT - on eliquis  - Diet - DASH/TLC/CC, fluid restrict  - Dispo - PT eval       The necessity of the time spent during the encounter on this date of service was due to:   - Ordering, reviewing, and interpreting labs, testing, and imaging  - Independently obtaining a review of systems and performing a physical exam  - Reviewing prior hospitalization and where necessary, outpatient records  - Reviewing consultant recommendations/communicating with consultants  - Counselling and educating patient and family regarding interpretation of aforementioned items and plan of care    Time-based billing (NON-critical care). Total minutes spent: 90 78M hypertension, CAD s/p stent, HFrEF, severe pulmonary hypertension, diabetes, DVT/PE on Eliquis, CKD, s/p PPM ([employer]) presenting with fall in setting of acute on chronic dyspnea on exertion 2/2 HF/pHTN exacerbation.     # Syncope  - unclear etiology  - r/o arrythmia - device supposedly interrogated in ED with no arrhythmias according to patient, f/u official report  - monitor on telemetry  - f/u orthostatics  - repeat trop pending  - PT evaluation    # HFrEF exacerbation  Pt with HFrEF; last EF ?. Home lasix 20 mg daily  - GDMT: [X]beta blocker [] ARNI/ACE/ARB []MRA [X] SGLT-i  - lasix 40 IV BID  - echo  - maintain strict I&Os  - daily weights (standing if tolerating)    # R 4th finger dislocation  - s/p reduction in ED  - pain control tylenol 975 mg q6 PRN mild pain    # CAD s/p stent, last stent over a year ago  - c/w aspirin, statin    # pHTN  - c/w home sildenafil, ambrisentan    # T2DM  Home DM meds: jardiance, lantus 42u qhs  - Inpatient FS goal 100-180  - C/w Basal 34u qhs  - FS/ARELY qac/qhs  - CC diet  - F/u A1c     # DVT/PE  Hx of DVT/PE diagnosed 2022, has been on continual AC since then  - c/w eliquis    # CKD  - baseline Cr around 3?  - Avoid NSAIDs, ACEI/ARBS, RCA and nephrotoxins.  - Renally dose medications  - c/w calcitriol     # HTN  - c/w coreg    # Depression  - c/w lexapro    # ARIANNA  - c/w iron supplement    # BPH  - c/w flomax    # PPx  - DVT - on eliquis  - Diet - DASH/TLC/CC, fluid restrict  - Dispo - PT eval

## 2023-11-19 NOTE — ED PROVIDER NOTE - OBJECTIVE STATEMENT
78-year-old male with past medical history of hypertension, CAD s/p stent, HFrEF, severe pulmonary hypertension, diabetes, DVT/PE on Eliquis, CKD, s/p PPM ([employer]) presenting with fall.  Patient reports that he got out of the car to walk to the store.  After taking 10-15 steps patient felt short of breath and lightheaded.  Patient does not recall exactly what happened but remembers waking up on the ground.  Patient called out to his wife and was helped to his feet by bystanders.  Family states that this was over the course of seconds.  Patient endorses hitting his head.  States he has pain in his right hand and abrasions over the right elbow, right shoulder and right forehead, but otherwise has no complaints currently.  Patient states that he has been having worsening shortness of breath with exertion over the past few weeks.  Patient otherwise denies fever/chills, chest pain, abdominal pain, nausea, vomiting, diarrhea

## 2023-11-19 NOTE — H&P ADULT - NSHPREVIEWOFSYSTEMS_GEN_ALL_CORE
REVIEW OF SYSTEMS:  CONSTITUTIONAL: No fever, chills  EYES: No eye pain, visual disturbances  ENMT:  No difficulty hearing, tinnitus  NECK: No pain or stiffness  RESPIRATORY: +dyspnea on exertion, wheezing. No shortness of breath  CARDIOVASCULAR: +leg swelling. No chest pain, palpitations  GASTROINTESTINAL: No abdominal pain. No N/V/D/C  GENITOURINARY: No dysuria, frequency  NEUROLOGICAL: No headaches, memory loss  SKIN: +abrasion on R elbow, shoulder  LYMPH NODES: No enlarged glands  ENDOCRINE: No heat or cold intolerance; No hair loss  MUSCULOSKELETAL: + R hand pain, occasional back pain  HEME/LYMPH: No easy bruising, or bleeding gums  ALLERGY AND IMMUNOLOGIC: No hives or eczema

## 2023-11-19 NOTE — H&P ADULT - NSICDXFAMILYHX_GEN_ALL_CORE_FT
FAMILY HISTORY:  FH: type 2 diabetes    Father  Still living? No  Family history of acute myocardial infarction of anterior wall, Age at diagnosis: Age Unknown    Mother  Still living? Unknown  FH: colon cancer, Age at diagnosis: Age Unknown

## 2023-11-19 NOTE — ED PROVIDER NOTE - MUSCULOSKELETAL [+], MLM
If provider orders tests at today's visit, patient would like to be contacted via Telephone.  If to contact patient by phone, patient's preferred phone # is [phone] (Cell) and it is OK to leave message on voice mail or with family member.  If medications are ordered at today's visit, the pharmacy name/location patient would like them to be sent to is   [id number] - [place], [place] - [facility] [address] AT [facility] & [place] [address]  [facility] [address]  Phone: [phone] Fax: [phone]    [facility] - [place], [place] - [address]   [address]  [facility] [address]  Phone: [phone] Fax: [phone]        
right hand and right elbow pain

## 2023-11-19 NOTE — ED PROVIDER NOTE - PHYSICAL EXAMINATION
Gen: AAO x 3, NAD  Skin: +abrasion right elbow,m right shoulder and right forehead  HEENT: PERRLA, EOMI, MMM  Resp: unlabored CTAB  Cardiac: rrr s1s2  GI: ND, +BS, Soft, NT  Ext: no pedal edema, FROM in all extremities  MSK: No midline cervical/thoracic/lumbar TTP, No snuff box TTP, right 4th digit TTP with deformity at PIP, FROM at the elbow and shoulder, compartments soft, FROM in all other extremities   Neuro: no focal deficits, Strength 5/5 BUE and BLE, sensation intact, clear speech

## 2023-11-19 NOTE — ED ADULT NURSE NOTE - OBJECTIVE STATEMENT
Patient presents to Ed with c/o of fall via amb. Patient with extensive medical hx on Eliquis reports he was walking to a store and   was sob then lightheaded and woke up on the ground with his wife and people assisting him. Patient reports he has been increasingly  sob on exertion and does not recall when he fell onto the ground. Patient c/o rt hand pain has abrasions to rt elbow rt shoulder and  rt forehead. Patient denies chest pain +sob on exertion no nausea vomiting cough fever chills headache or dizziness.

## 2023-11-19 NOTE — H&P ADULT - HISTORY OF PRESENT ILLNESS
78M hypertension, CAD s/p stent, HFrEF, severe pulmonary hypertension, diabetes, DVT/PE on Eliquis, CKD, s/p PPM ([employer]) presenting with fall Patient reports that he got out of the car to walk to the store.  After taking 10-15 steps patient felt short of breath and lightheaded.  Patient does not recall exactly what happened but remembers waking up on the ground.  Patient called out to his wife and was helped to his feet by bystanders.  Family states that this was over the course of seconds.  Patient endorses hitting his head.  States he has pain in his right hand and abrasions over the right elbow, right shoulder and right forehead, but otherwise has no complaints currently.  Patient states that he has been having worsening shortness of breath with exertion over the past few weeks.  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 Pt was getting out of the car to walk to the store and took a few steps when he yelled out for his wife. He doesn't remember falling, but woke up on the ground. By the time his wife, who was in the car, turned, he was already on the ground. He hit his head and R shoulder/elbow/hand and was bleeding. He had a rapid return to consciousness with no drowsiness afterwards. Family states that this was over the course of seconds.  Pt states in recent days he has been having worsening dyspnea on exertion, but denies any other symptoms.     In the ED, pt hemodynamically stable. Found to have R 4th digit PIP dislocation, reduced by ED. Pt was started on oxygen 2L NC for SaO2 92%. Does not use oxygen at home.

## 2023-11-19 NOTE — ED ADULT NURSE NOTE - NS ED NURSE TRANSPORT WITH
2L NC, full 02 tank under stretcher/oxygen 2L NC, full 02 tank under stretcher/Cardiac Monitor/Defib/ACLS/Rescue Kit/O2/BVM/oxygen 2L NC, full 02 tank under stretcher, off tele per ACP [place]/oxygen

## 2023-11-19 NOTE — PATIENT PROFILE ADULT - FALL HARM RISK - HARM RISK INTERVENTIONS
Assistance with ambulation/Assistance OOB with selected safe patient handling equipment/Communicate Risk of Fall with Harm to all staff/Discuss with provider need for PT consult/Monitor gait and stability/Reinforce activity limits and safety measures with patient and family/Tailored Fall Risk Interventions/Visual Cue: Yellow wristband and red socks/Bed in lowest position, wheels locked, appropriate side rails in place/Call bell, personal items and telephone in reach/Instruct patient to call for assistance before getting out of bed or chair/Non-slip footwear when patient is out of bed/[place] to call system/Physically safe environment - no spills, clutter or unnecessary equipment/Purposeful Proactive Rounding/Room/bathroom lighting operational, light cord in reach

## 2023-11-19 NOTE — H&P ADULT - NSHPADDITIONALINFOADULT_GEN_ALL_CORE
The necessity of the time spent during the encounter on this date of service was due to:   - Ordering, reviewing, and interpreting labs, testing, and imaging  - Independently obtaining a review of systems and performing a physical exam  - Reviewing prior hospitalization and where necessary, outpatient records  - Reviewing consultant recommendations/communicating with consultants  - Counselling and educating patient and family regarding interpretation of aforementioned items and plan of care    Time-based billing (NON-critical care). Total minutes spent: 100

## 2023-11-19 NOTE — ED ADULT NURSE REASSESSMENT NOTE - NS ED NURSE REASSESS COMMENT FT1
received report from DI Gonsales @1900. pt appears comfortable resting in stretcher with appropriate side rails up for safety. pt is A&Ox3, breathing even and unlabored, VSS, NAD noted at this time. pt admitted to Telemetry, CCM in place- NSR on telemetry. Overdue home medications not yet given by day shift RN administered to pt per orders. Labs drawn and sent per pending orders not yet completed by day shift RN. pt pending bed assignment. plan of care discussed.

## 2023-11-19 NOTE — H&P ADULT - NSHPLABSRESULTS_GEN_ALL_CORE
12.1   5.21  )-----------( 116      ( 19 Nov 2023 13:20 )             36.4     11-19    135  |  100  |  59<H>  ----------------------------<  181<H>  4.2   |  20<L>  |  3.43<H>    Ca    8.8      19 Nov 2023 13:20    TPro  5.9<L>  /  Alb  3.8  /  TBili  0.7  /  DBili  x   /  AST  26  /  ALT  30  /  AlkPhos  92  11-19          Venous: 11-19-23 @ 13:20 FiO2: -- Oxygen Sat% 37.1    Urinalysis Basic - ( 19 Nov 2023 13:20 )    Color: x / Appearance: x / SG: x / pH: x  Gluc: 181 mg/dL / Ketone: x  / Bili: x / Urobili: x   Blood: x / Protein: x / Nitrite: x   Leuk Esterase: x / RBC: x / WBC x   Sq Epi: x / Non Sq Epi: x / Bacteria: x    CAPILLARY BLOOD GLUCOSE    Xray R hand personally interpreted - dislocation of 4th digit, normal alignment s/p reduction

## 2023-11-20 LAB
A1C WITH ESTIMATED AVERAGE GLUCOSE RESULT: 7 % — HIGH (ref 4–5.6)
A1C WITH ESTIMATED AVERAGE GLUCOSE RESULT: 7 % — HIGH (ref 4–5.6)
ANION GAP SERPL CALC-SCNC: 15 MMOL/L — SIGNIFICANT CHANGE UP (ref 5–17)
ANION GAP SERPL CALC-SCNC: 15 MMOL/L — SIGNIFICANT CHANGE UP (ref 5–17)
BUN SERPL-MCNC: 56 MG/DL — HIGH (ref 7–23)
BUN SERPL-MCNC: 56 MG/DL — HIGH (ref 7–23)
CALCIUM SERPL-MCNC: 8.8 MG/DL — SIGNIFICANT CHANGE UP (ref 8.4–10.5)
CALCIUM SERPL-MCNC: 8.8 MG/DL — SIGNIFICANT CHANGE UP (ref 8.4–10.5)
CHLORIDE SERPL-SCNC: 104 MMOL/L — SIGNIFICANT CHANGE UP (ref 96–108)
CHLORIDE SERPL-SCNC: 104 MMOL/L — SIGNIFICANT CHANGE UP (ref 96–108)
CO2 SERPL-SCNC: 20 MMOL/L — LOW (ref 22–31)
CO2 SERPL-SCNC: 20 MMOL/L — LOW (ref 22–31)
CREAT SERPL-MCNC: 3.36 MG/DL — HIGH (ref 0.5–1.3)
CREAT SERPL-MCNC: 3.36 MG/DL — HIGH (ref 0.5–1.3)
EGFR: 18 ML/MIN/1.73M2 — LOW
EGFR: 18 ML/MIN/1.73M2 — LOW
ESTIMATED AVERAGE GLUCOSE: 154 MG/DL — HIGH (ref 68–114)
ESTIMATED AVERAGE GLUCOSE: 154 MG/DL — HIGH (ref 68–114)
GLUCOSE BLDC GLUCOMTR-MCNC: 105 MG/DL — HIGH (ref 70–99)
GLUCOSE BLDC GLUCOMTR-MCNC: 105 MG/DL — HIGH (ref 70–99)
GLUCOSE BLDC GLUCOMTR-MCNC: 132 MG/DL — HIGH (ref 70–99)
GLUCOSE BLDC GLUCOMTR-MCNC: 132 MG/DL — HIGH (ref 70–99)
GLUCOSE BLDC GLUCOMTR-MCNC: 165 MG/DL — HIGH (ref 70–99)
GLUCOSE BLDC GLUCOMTR-MCNC: 165 MG/DL — HIGH (ref 70–99)
GLUCOSE BLDC GLUCOMTR-MCNC: 175 MG/DL — HIGH (ref 70–99)
GLUCOSE BLDC GLUCOMTR-MCNC: 175 MG/DL — HIGH (ref 70–99)
GLUCOSE BLDC GLUCOMTR-MCNC: 222 MG/DL — HIGH (ref 70–99)
GLUCOSE BLDC GLUCOMTR-MCNC: 222 MG/DL — HIGH (ref 70–99)
GLUCOSE SERPL-MCNC: 176 MG/DL — HIGH (ref 70–99)
GLUCOSE SERPL-MCNC: 176 MG/DL — HIGH (ref 70–99)
HCT VFR BLD CALC: 36.6 % — LOW (ref 39–50)
HCT VFR BLD CALC: 36.6 % — LOW (ref 39–50)
HCV AB S/CO SERPL IA: 0.12 S/CO — SIGNIFICANT CHANGE UP (ref 0–0.99)
HCV AB S/CO SERPL IA: 0.12 S/CO — SIGNIFICANT CHANGE UP (ref 0–0.99)
HCV AB SERPL-IMP: SIGNIFICANT CHANGE UP
HCV AB SERPL-IMP: SIGNIFICANT CHANGE UP
HGB BLD-MCNC: 12.1 G/DL — LOW (ref 13–17)
HGB BLD-MCNC: 12.1 G/DL — LOW (ref 13–17)
MAGNESIUM SERPL-MCNC: 2.2 MG/DL — SIGNIFICANT CHANGE UP (ref 1.6–2.6)
MAGNESIUM SERPL-MCNC: 2.2 MG/DL — SIGNIFICANT CHANGE UP (ref 1.6–2.6)
MCHC RBC-ENTMCNC: 33.1 GM/DL — SIGNIFICANT CHANGE UP (ref 32–36)
MCHC RBC-ENTMCNC: 33.1 GM/DL — SIGNIFICANT CHANGE UP (ref 32–36)
MCHC RBC-ENTMCNC: 33.6 PG — SIGNIFICANT CHANGE UP (ref 27–34)
MCHC RBC-ENTMCNC: 33.6 PG — SIGNIFICANT CHANGE UP (ref 27–34)
MCV RBC AUTO: 101.7 FL — HIGH (ref 80–100)
MCV RBC AUTO: 101.7 FL — HIGH (ref 80–100)
NRBC # BLD: 0 /100 WBCS — SIGNIFICANT CHANGE UP (ref 0–0)
NRBC # BLD: 0 /100 WBCS — SIGNIFICANT CHANGE UP (ref 0–0)
PHOSPHATE SERPL-MCNC: 5.1 MG/DL — HIGH (ref 2.5–4.5)
PHOSPHATE SERPL-MCNC: 5.1 MG/DL — HIGH (ref 2.5–4.5)
PLATELET # BLD AUTO: 132 K/UL — LOW (ref 150–400)
PLATELET # BLD AUTO: 132 K/UL — LOW (ref 150–400)
POTASSIUM SERPL-MCNC: 3.8 MMOL/L — SIGNIFICANT CHANGE UP (ref 3.5–5.3)
POTASSIUM SERPL-MCNC: 3.8 MMOL/L — SIGNIFICANT CHANGE UP (ref 3.5–5.3)
POTASSIUM SERPL-SCNC: 3.8 MMOL/L — SIGNIFICANT CHANGE UP (ref 3.5–5.3)
POTASSIUM SERPL-SCNC: 3.8 MMOL/L — SIGNIFICANT CHANGE UP (ref 3.5–5.3)
RBC # BLD: 3.6 M/UL — LOW (ref 4.2–5.8)
RBC # BLD: 3.6 M/UL — LOW (ref 4.2–5.8)
RBC # FLD: 15.6 % — HIGH (ref 10.3–14.5)
RBC # FLD: 15.6 % — HIGH (ref 10.3–14.5)
SODIUM SERPL-SCNC: 139 MMOL/L — SIGNIFICANT CHANGE UP (ref 135–145)
SODIUM SERPL-SCNC: 139 MMOL/L — SIGNIFICANT CHANGE UP (ref 135–145)
WBC # BLD: 5.3 K/UL — SIGNIFICANT CHANGE UP (ref 3.8–10.5)
WBC # BLD: 5.3 K/UL — SIGNIFICANT CHANGE UP (ref 3.8–10.5)
WBC # FLD AUTO: 5.3 K/UL — SIGNIFICANT CHANGE UP (ref 3.8–10.5)
WBC # FLD AUTO: 5.3 K/UL — SIGNIFICANT CHANGE UP (ref 3.8–10.5)

## 2023-11-20 PROCEDURE — 93306 TTE W/DOPPLER COMPLETE: CPT | Mod: 26

## 2023-11-20 RX ORDER — CHLORHEXIDINE GLUCONATE 213 G/1000ML
1 SOLUTION TOPICAL
Refills: 0 | Status: DISCONTINUED | OUTPATIENT
Start: 2023-11-20 | End: 2023-11-22

## 2023-11-20 RX ADMIN — AMBRISENTAN 10 MILLIGRAM(S): 10 TABLET, FILM COATED ORAL at 14:18

## 2023-11-20 RX ADMIN — CARVEDILOL PHOSPHATE 3.12 MILLIGRAM(S): 80 CAPSULE, EXTENDED RELEASE ORAL at 18:02

## 2023-11-20 RX ADMIN — RANOLAZINE 1000 MILLIGRAM(S): 500 TABLET, FILM COATED, EXTENDED RELEASE ORAL at 06:36

## 2023-11-20 RX ADMIN — Medication 1 PUFF(S): at 06:14

## 2023-11-20 RX ADMIN — APIXABAN 5 MILLIGRAM(S): 2.5 TABLET, FILM COATED ORAL at 06:15

## 2023-11-20 RX ADMIN — CARVEDILOL PHOSPHATE 3.12 MILLIGRAM(S): 80 CAPSULE, EXTENDED RELEASE ORAL at 06:15

## 2023-11-20 RX ADMIN — Medication 20 MILLIGRAM(S): at 21:39

## 2023-11-20 RX ADMIN — Medication 20 MILLIGRAM(S): at 06:14

## 2023-11-20 RX ADMIN — ATORVASTATIN CALCIUM 40 MILLIGRAM(S): 80 TABLET, FILM COATED ORAL at 21:38

## 2023-11-20 RX ADMIN — Medication 40 MILLIGRAM(S): at 14:18

## 2023-11-20 RX ADMIN — RANOLAZINE 1000 MILLIGRAM(S): 500 TABLET, FILM COATED, EXTENDED RELEASE ORAL at 18:03

## 2023-11-20 RX ADMIN — APIXABAN 5 MILLIGRAM(S): 2.5 TABLET, FILM COATED ORAL at 18:03

## 2023-11-20 RX ADMIN — LORATADINE 10 MILLIGRAM(S): 10 TABLET ORAL at 11:30

## 2023-11-20 RX ADMIN — Medication 20 MILLIGRAM(S): at 06:29

## 2023-11-20 RX ADMIN — TAMSULOSIN HYDROCHLORIDE 0.4 MILLIGRAM(S): 0.4 CAPSULE ORAL at 21:38

## 2023-11-20 RX ADMIN — Medication 1 PUFF(S): at 14:18

## 2023-11-20 RX ADMIN — Medication 1 PUFF(S): at 18:14

## 2023-11-20 RX ADMIN — Medication 325 MILLIGRAM(S): at 11:30

## 2023-11-20 RX ADMIN — ESCITALOPRAM OXALATE 10 MILLIGRAM(S): 10 TABLET, FILM COATED ORAL at 11:30

## 2023-11-20 RX ADMIN — INSULIN GLARGINE 34 UNIT(S): 100 INJECTION, SOLUTION SUBCUTANEOUS at 21:38

## 2023-11-20 RX ADMIN — Medication 1 TABLET(S): at 11:30

## 2023-11-20 RX ADMIN — CALCITRIOL 0.25 MICROGRAM(S): 0.5 CAPSULE ORAL at 11:30

## 2023-11-20 RX ADMIN — Medication 20 MILLIGRAM(S): at 14:21

## 2023-11-20 RX ADMIN — Medication 1: at 17:09

## 2023-11-20 RX ADMIN — Medication 81 MILLIGRAM(S): at 11:30

## 2023-11-20 RX ADMIN — INSULIN GLARGINE 17 UNIT(S): 100 INJECTION, SOLUTION SUBCUTANEOUS at 00:16

## 2023-11-20 RX ADMIN — Medication 40 MILLIGRAM(S): at 06:14

## 2023-11-20 NOTE — PHYSICAL THERAPY INITIAL EVALUATION ADULT - PERTINENT HX OF CURRENT PROBLEM, REHAB EVAL
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  Pt presents with fall. Pt was getting out of the car to walk to the store and took a few steps when he yelled out for his wife. He doesn't remember falling, but woke up on the ground. He hit his head and R shoulder /elbow/hand and was bleeding. In the ED, pt hemodynamically stable. Found to have R 4th digit PIP dislocation, reduced by ED. Pt was started on oxygen 2L NC for SaO2 92%. Does not use oxygen at home. Pt being followed by cardiology, on telemetry.  XR Hand Right(11/19): Acute dislocation with associated avulsion fracture involving the right fourth PIP joint as described above.  CXR(11/19): No focal consolidations  XR Elbow Right(11/19): No acute fracture or dislocation. No elbow joint effusion.  CT Head(11/19): No acute intracranial hemorrhage, mass effect or CT evidence of an acute infarct. Periventricular white matter attenuation changes consistent with chronic microvascular ischemia as seen on the previous exam.  XR Hand(Post Reduction)(11/19): Status post reduction with improved anatomical alignment. Redemonstrated tiny ossific flecks in the palmar aspect of the right ring finger PIP joint which likely represents acute avulsion injury.

## 2023-11-20 NOTE — CONSULT NOTE ADULT - TIME BILLING
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     #Fall  -Etiology unclear   -CTH no significant findings  -cont tele   -Interrogate ppm  -Check orthostatics  -echo with normal lv fxn, same sev pulmo htn, TR    #hx of HFrEF s/p ppm, RHF, PHTN  -echo with preserved ED  -sev PHTN, TR  -Overloaded on exam, RHF  -Continue 40mg iv lasix bid  -Continue coreg  -Off ace/arb likely i/s/o ckd        #CAD s/p stent  -Cont asa, statin, ranexa    #Severe pHTN  -s/p recent RHC  -Continue ambrisentan, sildenafil  -F/u pulm    #Hx DVT/PE  -Continue eliquis     #CKD   -Renal f/u

## 2023-11-20 NOTE — PHYSICAL THERAPY INITIAL EVALUATION ADULT - ADDITIONAL COMMENTS
Pt lives in a house with wife with 1 JULIETTE (no HR), and bedroom on the second floor, with a chairlift.  Pt is independent with ambulation and ADLs, owns a cane and rolling walker but does not use it.

## 2023-11-20 NOTE — CONSULT NOTE ADULT - SUBJECTIVE AND OBJECTIVE BOX
Pt arrived for Venofer infusion. Vitals obtained and PIV started in L [facility]. Infusion started at 11:13 and ended at 12:24. Pt tolerated well. No s/s adverse reaction noted. Vitals remained stable as charted. PIV removed. Pt discharged home, via wheelchair with familly. CARDIOLOGY CONSULT - Dr. 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 Pt was getting out of the car to walk to the store and took a few steps when he yelled out for his wife. He doesn't remember falling, but woke up on the ground. By the time his wife, who was in the car, turned, he was already on the ground. He hit his head and R shoulder/elbow/hand and was bleeding. He had a rapid return to consciousness with no drowsiness afterwards. Family states that this was over the course of seconds.  Pt states in recent days he has been having worsening dyspnea on exertion, but denies any other symptoms.     In the ED, pt hemodynamically stable. Found to have R 4th digit PIP dislocation, reduced by ED. Pt was started on oxygen 2L NC for SaO2 92%. Does not use oxygen at home.  (19 Nov 2023 17:44)      PAST MEDICAL & SURGICAL HISTORY:  Depression      CAD (Coronary Artery Disease)      Hypertension      Hyperlipidemia      DM2 (diabetes mellitus, type 2)      CKD (chronic kidney disease), stage IV      Pulmonary embolism      Fracture of Humerus      History of tonsillectomy      PREVIOUS DIAGNOSTIC TESTING:    [x] Echocardiogram:  < from: TTE with Doppler (w/Cont) (02.11.19 @ 18:47) >  Conclusions:  1. Mitral annular calcification, otherwise normal mitral  valve.  2. Calcified trileaflet aortic valve with decreased  opening.  3. Moderate global LV dysfunction with severe segmental  left ventricular systolic dysfunction. Akinesis and  atrophy/scarring of the base to mid inferolateral and  inferior walls.  4. Mild diastolic dysfunction (Stage I).  5. The right ventricle is not well visualized; grossly  normal right ventricular systolic function.  *** No recent ECHO for comparison.    < end of copied text >    [x]  Catheterization:  < from: Cardiac Catheterization (09.28.23 @ 13:31) >    Diagnostic Conclusions:     Successful ultrasound guided access of right upper extremity to  perform right heart catheterization.    Succesful right heart catheterizationfrom right brachial venous  approach.    RA 17   RV 64/20   PA 75/25, 42   PCW 26     CO 4.07 L/min, CI 1.92 L/min/m2     Findings are consistent with pulmonary hypertension and elevated PCWP.      < end of copied text >    [ ] Stress Test:  	    MEDICATIONS:  Home Medications:  Allegra 60 mg oral tablet: 1 tab(s) orally once a day (19 Nov 2023 17:35)  ambrisentan 10 mg oral tablet: 1 tab(s) orally once a day (19 Nov 2023 17:37)  aspirin 81 mg oral delayed release tablet: 1 tab(s) orally once a day (19 Nov 2023 17:35)  Atrovent HFA 17 mcg/inh inhalation aerosol: 2 puff(s) by metered dose inhaler 3 times a day as needed (19 Nov 2023 17:37)  Benadryl 25 mg oral tablet: 2 tab(s) orally once a day (19 Nov 2023 17:35)  calcitriol 0.25 mcg oral capsule: 1 cap(s) orally 2 times a week (19 Nov 2023 17:37)  carvedilol 3.125 mg oral tablet: 1 tab(s) orally 2 times a day (19 Nov 2023 17:37)  cholecalciferol 1250 mcg (50,000 intl units) oral capsule: 1 cap(s) orally 2 times a week (Tues &amp; Thurs) (19 Nov 2023 17:37)  Eliquis 5 mg oral tablet: 1 tab(s) orally once a day (19 Nov 2023 17:37)  escitalopram 10 mg oral tablet: 1 tab(s) orally once a day (19 Nov 2023 17:37)  ferrous sulfate 325 mg (65 mg elemental iron) oral delayed release tablet: 1 tab(s) orally once a day (19 Nov 2023 17:37)  furosemide 20 mg oral tablet: 1 tab(s) orally once a day (19 Nov 2023 17:37)  Jardiance 25 mg oral tablet: 1 tab(s) orally once a day (19 Nov 2023 17:37)  Lantus 100 units/mL subcutaneous solution: 42 unit(s) subcutaneous once a day (19 Nov 2023 17:37)  Nephro-Reinier oral tablet: 1 tab(s) orally once a day (19 Nov 2023 17:37)  ranolazine 1000 mg oral tablet, extended release: 1 tab(s) orally 2 times a day (19 Nov 2023 17:37)  rosuvastatin 10 mg oral tablet: 1 tab(s) orally once a day (at bedtime) (19 Nov 2023 17:37)  sildenafil 20 mg oral tablet: 3 tab(s) orally 3 times a day (19 Nov 2023 17:37)  tamsulosin 0.4 mg oral capsule: 1 cap(s) orally once a day (19 Nov 2023 17:37)      MEDICATIONS  (STANDING):  ambrisentan 10 milliGRAM(s) Oral daily  apixaban 5 milliGRAM(s) Oral two times a day  aspirin enteric coated 81 milliGRAM(s) Oral daily  atorvastatin 40 milliGRAM(s) Oral at bedtime  calcitriol   Capsule 0.25 MICROGram(s) Oral daily  carvedilol 3.125 milliGRAM(s) Oral every 12 hours  dextrose 50% Injectable 25 Gram(s) IV Push once  dextrose 50% Injectable 25 Gram(s) IV Push once  dextrose 50% Injectable 12.5 Gram(s) IV Push once  dextrose Oral Gel 15 Gram(s) Oral once  escitalopram 10 milliGRAM(s) Oral daily  ferrous    sulfate 325 milliGRAM(s) Oral daily  furosemide   Injectable 40 milliGRAM(s) IV Push two times a day  glucagon  Injectable 1 milliGRAM(s) IntraMuscular once  insulin glargine Injectable (LANTUS) 34 Unit(s) SubCutaneous at bedtime  insulin lispro (ADMELOG) corrective regimen sliding scale   SubCutaneous at bedtime  insulin lispro (ADMELOG) corrective regimen sliding scale   SubCutaneous three times a day before meals  ipratropium 17 MICROgram(s) HFA Inhaler 1 Puff(s) Inhalation every 6 hours  loratadine 10 milliGRAM(s) Oral daily  Nephro-reinier 1 Tablet(s) Oral daily  ranolazine 1000 milliGRAM(s) Oral two times a day  sildenafil (REVATIO) 20 milliGRAM(s) Oral three times a day  tamsulosin 0.4 milliGRAM(s) Oral at bedtime      FAMILY HISTORY:  Family history of acute myocardial infarction of anterior wall (Father)    FH: type 2 diabetes    FH: colon cancer (Mother)        SOCIAL HISTORY:    [x] Non-smoker  [ ] Smoker  [ ] Alcohol    Allergies    penicillins (Hives; Rash)  sulfa drugs (Rash)  rye bread (Rash)  pork (Rash)    Intolerances      REVIEW OF SYSTEMS:  CONSTITUTIONAL: No fever, weight loss, or fatigue  EYES: No eye pain, visual disturbances, or discharge  ENMT:  No difficulty hearing, tinnitus, vertigo; No sinus or throat pain  NECK: No pain or stiffness  RESPIRATORY: No cough, wheezing, chills or hemoptysis; No Shortness of Breath  CARDIOVASCULAR: No chest pain, palpitations, dizziness, or leg swelling. +Syncope  GASTROINTESTINAL: No abdominal or epigastric pain. No nausea, vomiting, or hematemesis; No diarrhea or constipation. No melena or hematochezia.  GENITOURINARY: No dysuria, frequency, hematuria, or incontinence  NEUROLOGICAL: No headaches, memory loss, loss of strength, numbness, or tremors  SKIN: No itching, burning, rashes, or lesions   	    [x] All others negative	  [ ] Unable to obtain    PHYSICAL EXAM:  T(C): 36.6 (11-20-23 @ 05:45), Max: 36.8 (11-19-23 @ 21:26)  HR: 65 (11-20-23 @ 05:45) (63 - 71)  BP: 118/70 (11-20-23 @ 05:45) (102/62 - 137/72)  RR: 20 (11-20-23 @ 05:45) (16 - 20)  SpO2: 92% (11-20-23 @ 05:45) (92% - 99%)  Wt(kg): --  I&O's Summary    19 Nov 2023 07:01  -  20 Nov 2023 07:00  --------------------------------------------------------  IN: 0 mL / OUT: 1350 mL / NET: -1350 mL        Appearance: Normal	  Psychiatry: A & O x 3, Mood & affect appropriate  HEENT:   Normal oral mucosa, PERRL, EOMI	  Lymphatic: No lymphadenopathy  Cardiovascular: Normal S1 S2,RRR, No JVD, No murmurs  Respiratory: Rhonchi b/l   Gastrointestinal:  Soft, Non-tender, + BS	  Skin: No rashes, No ecchymoses, No cyanosis	  Neurologic: Non-focal  Extremities: Normal range of motion, No clubbing, cyanosis. +B/l le edema  Vascular: Peripheral pulses palpable 2+ bilaterally    TELEMETRY: NSR 65 	    ECG:  SR 65bpm, RBBB , prolonged qtc 	  RADIOLOGY:  < from: Xray Chest 1 View AP/PA (11.19.23 @ 13:38) >  IMPRESSION:  No focal consolidations.    --- End of Report ---    < end of copied text >    < from: Xray Hand 3 Views, Right (11.19.23 @ 16:59) >  IMPRESSION:  Status post reduction with improved anatomical alignment. Redemonstrated   tiny ossific flecks in the palmar aspect of the right ring finger PIP   joint which likely represents acute avulsion injury.    --- End of Report ---    < end of copied text >    < from: CT Head No Cont (11.19.23 @ 14:44) >  IMPRESSION:    No acute intracranial hemorrhage, mass effect or CT evidence of an acute   infarct.    Periventricular white matter attenuation changes consistent with chronic   microvascular ischemia as seen on the previous exam.    < end of copied text >      OTHER: 	  	  LABS:	 	    CARDIAC MARKERS:  Troponin T, High Sensitivity Result: 85 ng/L (11-19 @ 19:40)  Troponin T, High Sensitivity Result: 90 ng/L (11-19 @ 13:20)                                  12.1   5.30  )-----------( 132      ( 20 Nov 2023 06:24 )             36.6     11-20    139  |  104  |  56<H>  ----------------------------<  176<H>  3.8   |  20<L>  |  3.36<H>    Ca    8.8      20 Nov 2023 06:22  Phos  5.1     11-20  Mg     2.2     11-20    TPro  5.9<L>  /  Alb  3.8  /  TBili  0.7  /  DBili  x   /  AST  26  /  ALT  30  /  AlkPhos  92  11-19      proBNP:   Lipid Profile:   HgA1c:   TSH:        CARDIOLOGY CONSULT - Dr. 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 Pt was getting out of the car to walk to the store and took a few steps when he yelled out for his wife. He doesn't remember falling, but woke up on the ground. By the time his wife, who was in the car, turned, he was already on the ground. He hit his head and R shoulder/elbow/hand and was bleeding. He had a rapid return to consciousness with no drowsiness afterwards. Family states that this was over the course of seconds.  Pt states in recent days he has been having worsening dyspnea on exertion, but denies any other symptoms.     In the ED, pt hemodynamically stable. Found to have R 4th digit PIP dislocation, reduced by ED. Pt was started on oxygen 2L NC for SaO2 92%. Does not use oxygen at home.  (19 Nov 2023 17:44)      PAST MEDICAL & SURGICAL HISTORY:  Depression      CAD (Coronary Artery Disease)      Hypertension      Hyperlipidemia      DM2 (diabetes mellitus, type 2)      CKD (chronic kidney disease), stage IV      Pulmonary embolism      Fracture of Humerus      History of tonsillectomy      PREVIOUS DIAGNOSTIC TESTING:    [x] Echocardiogram:  < from: TTE with Doppler (w/Cont) (02.11.19 @ 18:47) >  Conclusions:  1. Mitral annular calcification, otherwise normal mitral  valve.  2. Calcified trileaflet aortic valve with decreased  opening.  3. Moderate global LV dysfunction with severe segmental  left ventricular systolic dysfunction. Akinesis and  atrophy/scarring of the base to mid inferolateral and  inferior walls.  4. Mild diastolic dysfunction (Stage I).  5. The right ventricle is not well visualized; grossly  normal right ventricular systolic function.  *** No recent ECHO for comparison.    < end of copied text >    [x]  Catheterization:  < from: Cardiac Catheterization (09.28.23 @ 13:31) >    Diagnostic Conclusions:     Successful ultrasound guided access of right upper extremity to  perform right heart catheterization.    Succesful right heart catheterizationfrom right brachial venous  approach.    RA 17   RV 64/20   PA 75/25, 42   PCW 26     CO 4.07 L/min, CI 1.92 L/min/m2     Findings are consistent with pulmonary hypertension and elevated PCWP.      < end of copied text >    < from: Cardiac Cath Lab - Adult (02.14.19 @ 14:33) >  CORONARY VESSELS: The coronary circulation is right dominant.  CX:   --  Proximal circumflex: There was a 100 % stenosis.  COMPLICATIONS: There were no complications.  INTERVENTIONAL RECOMMENDATIONS: ASA and Plavix for 3 mths    < end of copied text >      [ ] Stress Test:  	    MEDICATIONS:  Home Medications:  Allegra 60 mg oral tablet: 1 tab(s) orally once a day (19 Nov 2023 17:35)  ambrisentan 10 mg oral tablet: 1 tab(s) orally once a day (19 Nov 2023 17:37)  aspirin 81 mg oral delayed release tablet: 1 tab(s) orally once a day (19 Nov 2023 17:35)  Atrovent HFA 17 mcg/inh inhalation aerosol: 2 puff(s) by metered dose inhaler 3 times a day as needed (19 Nov 2023 17:37)  Benadryl 25 mg oral tablet: 2 tab(s) orally once a day (19 Nov 2023 17:35)  calcitriol 0.25 mcg oral capsule: 1 cap(s) orally 2 times a week (19 Nov 2023 17:37)  carvedilol 3.125 mg oral tablet: 1 tab(s) orally 2 times a day (19 Nov 2023 17:37)  cholecalciferol 1250 mcg (50,000 intl units) oral capsule: 1 cap(s) orally 2 times a week (Tues &amp; Thurs) (19 Nov 2023 17:37)  Eliquis 5 mg oral tablet: 1 tab(s) orally once a day (19 Nov 2023 17:37)  escitalopram 10 mg oral tablet: 1 tab(s) orally once a day (19 Nov 2023 17:37)  ferrous sulfate 325 mg (65 mg elemental iron) oral delayed release tablet: 1 tab(s) orally once a day (19 Nov 2023 17:37)  furosemide 20 mg oral tablet: 1 tab(s) orally once a day (19 Nov 2023 17:37)  Jardiance 25 mg oral tablet: 1 tab(s) orally once a day (19 Nov 2023 17:37)  Lantus 100 units/mL subcutaneous solution: 42 unit(s) subcutaneous once a day (19 Nov 2023 17:37)  Nephro-Reinier oral tablet: 1 tab(s) orally once a day (19 Nov 2023 17:37)  ranolazine 1000 mg oral tablet, extended release: 1 tab(s) orally 2 times a day (19 Nov 2023 17:37)  rosuvastatin 10 mg oral tablet: 1 tab(s) orally once a day (at bedtime) (19 Nov 2023 17:37)  sildenafil 20 mg oral tablet: 3 tab(s) orally 3 times a day (19 Nov 2023 17:37)  tamsulosin 0.4 mg oral capsule: 1 cap(s) orally once a day (19 Nov 2023 17:37)      MEDICATIONS  (STANDING):  ambrisentan 10 milliGRAM(s) Oral daily  apixaban 5 milliGRAM(s) Oral two times a day  aspirin enteric coated 81 milliGRAM(s) Oral daily  atorvastatin 40 milliGRAM(s) Oral at bedtime  calcitriol   Capsule 0.25 MICROGram(s) Oral daily  carvedilol 3.125 milliGRAM(s) Oral every 12 hours  dextrose 50% Injectable 25 Gram(s) IV Push once  dextrose 50% Injectable 25 Gram(s) IV Push once  dextrose 50% Injectable 12.5 Gram(s) IV Push once  dextrose Oral Gel 15 Gram(s) Oral once  escitalopram 10 milliGRAM(s) Oral daily  ferrous    sulfate 325 milliGRAM(s) Oral daily  furosemide   Injectable 40 milliGRAM(s) IV Push two times a day  glucagon  Injectable 1 milliGRAM(s) IntraMuscular once  insulin glargine Injectable (LANTUS) 34 Unit(s) SubCutaneous at bedtime  insulin lispro (ADMELOG) corrective regimen sliding scale   SubCutaneous at bedtime  insulin lispro (ADMELOG) corrective regimen sliding scale   SubCutaneous three times a day before meals  ipratropium 17 MICROgram(s) HFA Inhaler 1 Puff(s) Inhalation every 6 hours  loratadine 10 milliGRAM(s) Oral daily  Nephro-reinier 1 Tablet(s) Oral daily  ranolazine 1000 milliGRAM(s) Oral two times a day  sildenafil (REVATIO) 20 milliGRAM(s) Oral three times a day  tamsulosin 0.4 milliGRAM(s) Oral at bedtime      FAMILY HISTORY:  Family history of acute myocardial infarction of anterior wall (Father)    FH: type 2 diabetes    FH: colon cancer (Mother)        SOCIAL HISTORY:    [x] Non-smoker  [ ] Smoker  [ ] Alcohol    Allergies    penicillins (Hives; Rash)  sulfa drugs (Rash)  rye bread (Rash)  pork (Rash)    Intolerances      REVIEW OF SYSTEMS:  CONSTITUTIONAL: No fever, weight loss, or fatigue  EYES: No eye pain, visual disturbances, or discharge  ENMT:  No difficulty hearing, tinnitus, vertigo; No sinus or throat pain  NECK: No pain or stiffness  RESPIRATORY: No cough, wheezing, chills or hemoptysis; No Shortness of Breath  CARDIOVASCULAR: No chest pain, palpitations, dizziness, or leg swelling. +Syncope  GASTROINTESTINAL: No abdominal or epigastric pain. No nausea, vomiting, or hematemesis; No diarrhea or constipation. No melena or hematochezia.  GENITOURINARY: No dysuria, frequency, hematuria, or incontinence  NEUROLOGICAL: No headaches, memory loss, loss of strength, numbness, or tremors  SKIN: No itching, burning, rashes, or lesions   	    [x] All others negative	  [ ] Unable to obtain    PHYSICAL EXAM:  T(C): 36.6 (11-20-23 @ 05:45), Max: 36.8 (11-19-23 @ 21:26)  HR: 65 (11-20-23 @ 05:45) (63 - 71)  BP: 118/70 (11-20-23 @ 05:45) (102/62 - 137/72)  RR: 20 (11-20-23 @ 05:45) (16 - 20)  SpO2: 92% (11-20-23 @ 05:45) (92% - 99%)  Wt(kg): --  I&O's Summary    19 Nov 2023 07:01  -  20 Nov 2023 07:00  --------------------------------------------------------  IN: 0 mL / OUT: 1350 mL / NET: -1350 mL        Appearance: Normal	  Psychiatry: A & O x 3, Mood & affect appropriate  HEENT:   Normal oral mucosa, PERRL, EOMI	  Lymphatic: No lymphadenopathy  Cardiovascular: Normal S1 S2,RRR, No JVD, No murmurs  Respiratory: Rhonchi b/l   Gastrointestinal:  Soft, Non-tender, + BS	  Skin: No rashes, No ecchymoses, No cyanosis	  Neurologic: Non-focal  Extremities: Normal range of motion, No clubbing, cyanosis. +B/l le edema  Vascular: Peripheral pulses palpable 2+ bilaterally    TELEMETRY: NSR 65 	    ECG:  SR 65bpm, RBBB , prolonged qtc 	  RADIOLOGY:  < from: Xray Chest 1 View AP/PA (11.19.23 @ 13:38) >  IMPRESSION:  No focal consolidations.    --- End of Report ---    < end of copied text >    < from: Xray Hand 3 Views, Right (11.19.23 @ 16:59) >  IMPRESSION:  Status post reduction with improved anatomical alignment. Redemonstrated   tiny ossific flecks in the palmar aspect of the right ring finger PIP   joint which likely represents acute avulsion injury.    --- End of Report ---    < end of copied text >    < from: CT Head No Cont (11.19.23 @ 14:44) >  IMPRESSION:    No acute intracranial hemorrhage, mass effect or CT evidence of an acute   infarct.    Periventricular white matter attenuation changes consistent with chronic   microvascular ischemia as seen on the previous exam.    < end of copied text >      OTHER: 	  	  LABS:	 	    CARDIAC MARKERS:  Troponin T, High Sensitivity Result: 85 ng/L (11-19 @ 19:40)  Troponin T, High Sensitivity Result: 90 ng/L (11-19 @ 13:20)                                  12.1   5.30  )-----------( 132      ( 20 Nov 2023 06:24 )             36.6     11-20    139  |  104  |  56<H>  ----------------------------<  176<H>  3.8   |  20<L>  |  3.36<H>    Ca    8.8      20 Nov 2023 06:22  Phos  5.1     11-20  Mg     2.2     11-20    TPro  5.9<L>  /  Alb  3.8  /  TBili  0.7  /  DBili  x   /  AST  26  /  ALT  30  /  AlkPhos  92  11-19      proBNP:   Lipid Profile:   HgA1c:   TSH:

## 2023-11-20 NOTE — CONSULT NOTE ADULT - ASSESSMENT
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 Pt was getting out of the car to walk to the store and took a few steps when he yelled out for his wife. He doesn't remember falling, but woke up on the ground. By the time his wife, who was in the car, turned, he was already on the ground. He hit his head and R shoulder/elbow/hand and was bleeding. He had a rapid return to consciousness with no drowsiness afterwards. Family states that this was over the course of seconds.  Pt states in recent days he has been having worsening dyspnea on exertion, but denies any other symptoms. In the ED, pt hemodynamically stable. Found to have R 4th digit PIP dislocation, reduced by ED. Pt was started on oxygen 2L NC for SaO2 92%. Renal consult called       1- CKDIV  2- CHF  3- syncope  4- shpt  5- bph      baseline CKDIV, creatinine range ~3-3.5  check U/A and urine protein/creatinine ratio  continue lasix 40 mg iv bid  check orthostatics  check pth, continue calcitroil 0.25 mcg daily  continue flomax 0.4 mg daily  strict I/O  daily standing weight  trend creatinine and electrolytes daily

## 2023-11-20 NOTE — CONSULT NOTE ADULT - NS ATTEND AMEND GEN_ALL_CORE FT
Seen, examined with, formulated plan with and  agree with above as scribed by NP Sirisha [[place]]     admission after syncope/fall   lungs no rales but decrease bs   heart RRR  ext 2+ edema       pulm thn   chf   ckd IV   shpt    lasix 40 mg Iv bid   to check orthostatics   keep O> I   calcitriol 0.25mcg daily   tele monitor and ppm interrogation   d/w pt wife   d/w cards

## 2023-11-20 NOTE — CONSULT NOTE ADULT - SUBJECTIVE AND OBJECTIVE BOX
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 Pt was getting out of the car to walk to the store and took a few steps when he yelled out for his wife. He doesn't remember falling, but woke up on the ground. By the time his wife, who was in the car, turned, he was already on the ground. He hit his head and R shoulder/elbow/hand and was bleeding. He had a rapid return to consciousness with no drowsiness afterwards. Family states that this was over the course of seconds.  Pt states in recent days he has been having worsening dyspnea on exertion, but denies any other symptoms.     In the ED, pt hemodynamically stable. Found to have R 4th digit PIP dislocation, reduced by ED. Pt was started on oxygen 2L NC for SaO2 92%. Does not use oxygen at home.     PAST HISTORY  --------------------------------------------------------------------------------  PAST MEDICAL & SURGICAL HISTORY:  Depression      CAD (Coronary Artery Disease)      Hypertension      Hyperlipidemia      DM2 (diabetes mellitus, type 2)      CKD (chronic kidney disease), stage IV      Pulmonary embolism      Fracture of Humerus      History of tonsillectomy        FAMILY HISTORY:  Family history of acute myocardial infarction of anterior wall (Father)    FH: type 2 diabetes    FH: colon cancer (Mother)      PAST SOCIAL HISTORY:    ALLERGIES & MEDICATIONS  --------------------------------------------------------------------------------  Allergies    penicillins (Hives; Rash)  sulfa drugs (Rash)  rye bread (Rash)  pork (Rash)    Intolerances      Standing Inpatient Medications  ambrisentan 10 milliGRAM(s) Oral daily  apixaban 5 milliGRAM(s) Oral two times a day  aspirin enteric coated 81 milliGRAM(s) Oral daily  atorvastatin 40 milliGRAM(s) Oral at bedtime  calcitriol   Capsule 0.25 MICROGram(s) Oral daily  carvedilol 3.125 milliGRAM(s) Oral every 12 hours  chlorhexidine 2% Cloths 1 Application(s) Topical <User Schedule>  dextrose 50% Injectable 12.5 Gram(s) IV Push once  dextrose 50% Injectable 25 Gram(s) IV Push once  dextrose 50% Injectable 25 Gram(s) IV Push once  dextrose Oral Gel 15 Gram(s) Oral once  escitalopram 10 milliGRAM(s) Oral daily  ferrous    sulfate 325 milliGRAM(s) Oral daily  furosemide   Injectable 40 milliGRAM(s) IV Push two times a day  glucagon  Injectable 1 milliGRAM(s) IntraMuscular once  insulin glargine Injectable (LANTUS) 34 Unit(s) SubCutaneous at bedtime  insulin lispro (ADMELOG) corrective regimen sliding scale   SubCutaneous three times a day before meals  insulin lispro (ADMELOG) corrective regimen sliding scale   SubCutaneous at bedtime  ipratropium 17 MICROgram(s) HFA Inhaler 1 Puff(s) Inhalation every 6 hours  loratadine 10 milliGRAM(s) Oral daily  Nephro-reinier 1 Tablet(s) Oral daily  ranolazine 1000 milliGRAM(s) Oral two times a day  sildenafil (REVATIO) 20 milliGRAM(s) Oral three times a day  tamsulosin 0.4 milliGRAM(s) Oral at bedtime    PRN Inpatient Medications  acetaminophen     Tablet .. 975 milliGRAM(s) Oral every 6 hours PRN  diphenhydrAMINE 25 milliGRAM(s) Oral two times a day PRN  melatonin 3 milliGRAM(s) Oral at bedtime PRN      REVIEW OF SYSTEMS  --------------------------------------------------------------------------------  Gen: No fevers/chills   Head/Eyes/Ears/Mouth: No headache; Normal hearing;  Respiratory: +dyspnea, denies cough, wheezing,  CV: No chest pain, orthopnea  GI: No abdominal pain, diarrhea, nausea, vomiting  : No dysuria, decrease urination  MSK: No joint pain/swelling; no back pain  Neuro: No dizziness +weakness,  also with edema       VITALS/PHYSICAL EXAM  --------------------------------------------------------------------------------  T(C): 36.7 (11-20-23 @ 11:15), Max: 36.8 (11-19-23 @ 21:26)  HR: 63 (11-20-23 @ 14:29) (63 - 71)  BP: 124/76 (11-20-23 @ 14:29) (106/67 - 137/72)  RR: 18 (11-20-23 @ 11:15) (18 - 20)  SpO2: 96% (11-20-23 @ 11:15) (92% - 99%)  Wt(kg): --  Height (cm): 180.3 (11-19-23 @ 12:22)  Weight (kg): 97.1 (11-19-23 @ 12:26)  BMI (kg/m2): 29.9 (11-19-23 @ 12:26)  BSA (m2): 2.17 (11-19-23 @ 12:26)      11-19-23 @ 07:01  -  11-20-23 @ 07:00  --------------------------------------------------------  IN: 0 mL / OUT: 1350 mL / NET: -1350 mL    11-20-23 @ 07:01  -  11-20-23 @ 15:14  --------------------------------------------------------  IN: 240 mL / OUT: 700 mL / NET: -460 mL      Physical Exam:  	Gen: Non toxic comfortable appearing, on O2, scalp abbrasion   	Pulm: decrease bs  no rales or ronchi or wheezing  	CV: +JVD. RRR, S1S2; no rub  	Abd: +BS, soft, nontender/nondistended  	: No suprapubic tenderness  	UE: Warm, no cyanosis  no clubbing,  no edema;   	LE: Warm, no cyanosis  no clubbing,B/L 2+ edema  	Neuro: alert and oriented. speech coherent   	Skin: Warm, no decrease skin turgor     LABS/STUDIES  --------------------------------------------------------------------------------              12.1   5.30  >-----------<  132      [11-20-23 @ 06:24]              36.6     139  |  104  |  56  ----------------------------<  176      [11-20-23 @ 06:22]  3.8   |  20  |  3.36        Ca     8.8     [11-20-23 @ 06:22]      Mg     2.2     [11-20-23 @ 06:22]      Phos  5.1     [11-20-23 @ 06:22]    TPro  5.9  /  Alb  3.8  /  TBili  0.7  /  DBili  x   /  AST  26  /  ALT  30  /  AlkPhos  92  [11-19-23 @ 13:20]          Creatinine Trend:  SCr 3.36 [11-20 @ 06:22]  SCr 3.43 [11-19 @ 13:20]    Urinalysis - [11-20-23 @ 06:22]      Color  / Appearance  / SG  / pH       Gluc 176 / Ketone   / Bili  / Urobili        Blood  / Protein  / Leuk Est  / Nitrite       RBC  / WBC  / Hyaline  / Gran  / Sq Epi  / Non Sq Epi  / Bacteria       HbA1c 7.3      [02-12-19 @ 07:27]    HCV 0.12, Nonreact      [11-20-23 @ 06:23]     [facility]  [phone]  NEPHROLOGY      INITIAL CONSULT NOTE  --------------------------------------------------------------------------------  HPI:    78M hypertension, CAD s/p stent, HFrEF, severe pHTN, T2DM on insulin, 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 Pt was getting out of the car to walk to the store and took a few steps when he yelled out for his wife. He doesn't remember falling, but woke up on the ground. By the time his wife, who was in the car, turned, he was already on the ground. He hit his head and R shoulder/elbow/hand and was bleeding. He had a rapid return to consciousness with no drowsiness afterwards. Family states that this was over the course of seconds.  Pt states in recent days he has been having worsening dyspnea on exertion, but denies any other symptoms. In the ED, pt hemodynamically stable. Found to have R 4th digit PIP dislocation, reduced by ED. Pt was started on oxygen 2L NC for SaO2 92%. Renal consult called     PAST HISTORY  --------------------------------------------------------------------------------  PAST MEDICAL & SURGICAL HISTORY:  Depression      CAD (Coronary Artery Disease)      Hypertension      Hyperlipidemia      DM2 (diabetes mellitus, type 2)      CKD (chronic kidney disease), stage IV      Pulmonary embolism      Fracture of Humerus      History of tonsillectomy        FAMILY HISTORY:  Family history of acute myocardial infarction of anterior wall (Father)    FH: type 2 diabetes    FH: colon cancer (Mother)      PAST SOCIAL HISTORY:    ALLERGIES & MEDICATIONS  --------------------------------------------------------------------------------  Allergies    penicillins (Hives; Rash)  sulfa drugs (Rash)  rye bread (Rash)  pork (Rash)    Intolerances      Standing Inpatient Medications  ambrisentan 10 milliGRAM(s) Oral daily  apixaban 5 milliGRAM(s) Oral two times a day  aspirin enteric coated 81 milliGRAM(s) Oral daily  atorvastatin 40 milliGRAM(s) Oral at bedtime  calcitriol   Capsule 0.25 MICROGram(s) Oral daily  carvedilol 3.125 milliGRAM(s) Oral every 12 hours  chlorhexidine 2% Cloths 1 Application(s) Topical <User Schedule>  dextrose 50% Injectable 12.5 Gram(s) IV Push once  dextrose 50% Injectable 25 Gram(s) IV Push once  dextrose 50% Injectable 25 Gram(s) IV Push once  dextrose Oral Gel 15 Gram(s) Oral once  escitalopram 10 milliGRAM(s) Oral daily  ferrous    sulfate 325 milliGRAM(s) Oral daily  furosemide   Injectable 40 milliGRAM(s) IV Push two times a day  glucagon  Injectable 1 milliGRAM(s) IntraMuscular once  insulin glargine Injectable (LANTUS) 34 Unit(s) SubCutaneous at bedtime  insulin lispro (ADMELOG) corrective regimen sliding scale   SubCutaneous three times a day before meals  insulin lispro (ADMELOG) corrective regimen sliding scale   SubCutaneous at bedtime  ipratropium 17 MICROgram(s) HFA Inhaler 1 Puff(s) Inhalation every 6 hours  loratadine 10 milliGRAM(s) Oral daily  Nephro-reinier 1 Tablet(s) Oral daily  ranolazine 1000 milliGRAM(s) Oral two times a day  sildenafil (REVATIO) 20 milliGRAM(s) Oral three times a day  tamsulosin 0.4 milliGRAM(s) Oral at bedtime    PRN Inpatient Medications  acetaminophen     Tablet .. 975 milliGRAM(s) Oral every 6 hours PRN  diphenhydrAMINE 25 milliGRAM(s) Oral two times a day PRN  melatonin 3 milliGRAM(s) Oral at bedtime PRN      REVIEW OF SYSTEMS  --------------------------------------------------------------------------------  Gen: No fevers/chills   Head/Eyes/Ears/Mouth: No headache; Normal hearing;  Respiratory: +dyspnea, denies cough, wheezing,  CV: No chest pain, orthopnea  GI: No abdominal pain, diarrhea, nausea, vomiting  : No dysuria, decrease urination  MSK: No joint pain/swelling; no back pain  Neuro: No dizziness +weakness,  also with edema       VITALS/PHYSICAL EXAM  --------------------------------------------------------------------------------  T(C): 36.7 (11-20-23 @ 11:15), Max: 36.8 (11-19-23 @ 21:26)  HR: 63 (11-20-23 @ 14:29) (63 - 71)  BP: 124/76 (11-20-23 @ 14:29) (106/67 - 137/72)  RR: 18 (11-20-23 @ 11:15) (18 - 20)  SpO2: 96% (11-20-23 @ 11:15) (92% - 99%)  Wt(kg): --  Height (cm): 180.3 (11-19-23 @ 12:22)  Weight (kg): 97.1 (11-19-23 @ 12:26)  BMI (kg/m2): 29.9 (11-19-23 @ 12:26)  BSA (m2): 2.17 (11-19-23 @ 12:26)      11-19-23 @ 07:01  -  11-20-23 @ 07:00  --------------------------------------------------------  IN: 0 mL / OUT: 1350 mL / NET: -1350 mL    11-20-23 @ 07:01  -  11-20-23 @ 15:14  --------------------------------------------------------  IN: 240 mL / OUT: 700 mL / NET: -460 mL      Physical Exam:  	Gen: Non toxic comfortable appearing, on O2, scalp abbrasion   	Pulm: decrease bs  no rales or ronchi or wheezing  	CV: +JVD. RRR, S1S2; no rub  	Abd: +BS, soft, nontender/nondistended  	: No suprapubic tenderness  	UE: Warm, no cyanosis  no clubbing,  no edema;   	LE: Warm, no cyanosis  no clubbing, B/L 3+ edema  	Neuro: alert and oriented. speech coherent   	Skin: Warm, no decrease skin turgor     LABS/STUDIES  --------------------------------------------------------------------------------              12.1   5.30  >-----------<  132      [11-20-23 @ 06:24]              36.6     139  |  104  |  56  ----------------------------<  176      [11-20-23 @ 06:22]  3.8   |  20  |  3.36        Ca     8.8     [11-20-23 @ 06:22]      Mg     2.2     [11-20-23 @ 06:22]      Phos  5.1     [11-20-23 @ 06:22]    TPro  5.9  /  Alb  3.8  /  TBili  0.7  /  DBili  x   /  AST  26  /  ALT  30  /  AlkPhos  92  [11-19-23 @ 13:20]          Creatinine Trend:  SCr 3.36 [11-20 @ 06:22]  SCr 3.43 [11-19 @ 13:20]        HbA1c 7.3      [02-12-19 @ 07:27]    HCV 0.12, Nonreact      [11-20-23 @ 06:23]      < from: TTE W or WO Ultrasound Enhancing Agent (11.20.23 @ 12:19) >  TRANSTHORACIC ECHOCARDIOGRAM REPORT  ________________________________________________________________________________                                      _______       Pt. Name:       NATALY ZAVALA Study Date:    11/20/2023  MRN:            [id number]       YOB: 1945  Accession #:    [id number]      Age:           78 years  Account#:       [id number]   Gender:        M  Heart Rate:                    Height:        70.00 in (177.80 cm)  Rhythm:                        Weight:   213.00 lb (96.62 kg)  Blood Pressure: 118/70 mmHg    BSA/BMI:       2.14 m² / 30.56 kg/m²  ________________________________________________________________________________________  Referring Physician:    [id number] Claudy Mock  Interpreting Physician: Chon Sharp M.D.  Primary Sonographer:    Navya Silver RDCS    CPT:                ECHO TTE WITH CON COMP W DOPP - .m;DEFINITY ECHO                      CONTRAST PER ML - .m;DEFINITY ECHO CONTRAST PER ML                      WASTED - .m  Indication(s):      Heart failure, unspecified - I50.9  Procedure:          Transthoracic echocardiogram with 2-D, M-mode and complete                      spectral and color flow Doppler.  Ordering Location:  [facility]  Admission Status:Inpatient  Contrast Injection: Verbal consent was obtained for injection of Ultrasonic                      Enhancing Agent following a discussion of risks and                      benefits.                      Endocardial visualization enhanced with 2 ml of Definity                      Ultrasound enhancing agent (Lot#:[id number] Exp.Date:October 2024                      Discarded Dose:8ml).  UEA Reaction:       Patient had no adverse reaction after injection of                      Ultrasound Enhancing Agent.  Study Information:  Image quality for this study is technically difficult.    _______________________________________________________________________________________     CONCLUSIONS:      1. The interventricular septum is flattened in systole and diastole consistent with right ventricular pressure and volume overload. Left ventricular systolic function is normal.   2. Severely enlarged right ventricular cavity size, wall thickness, and moderately reduced systolic function. Tricuspid annular plane systolic excursion (TAPSE) is 1.2 cm (normal >=1.7 cm).   3. The right atrium is severely dilated in size.   4. No pericardial effusion seen.   5. Moderate to severe tricuspid regurgitation.   6. Estimated pulmonary artery systolic pressure is 74 mmHg, consistent with severe pulmonary hypertension.   7. No prior echocardiogram is available for comparison.   8. Technically difficult image quality.   9. The inferior vena cava is dilated measuring 2.30 cm in diameter, (dilated >2.1cm) with abnormal inspiratory collapse (abnormal <50%) consistent with elevated right atrial pressure (~15, range 10-20mmHg).  10. There is normal LV mass and normal geometry.    ________________________________________________________________________________________  FINDINGS:     Left Ventricle:  The interventricular septum is flattened in systole and diastole consistent with right ventricular pressure and volume overload. Left ventricular systolic function is normal with a calculated ejection fraction of 54 % by the Montoya's biplane method of disks. The left ventricular diastolic function is indeterminate, with normal filling pressure. There is normal LV mass and normal geometry. There is no evidence of a left ventricular thrombus.     Right Ventricle:  The right ventricular cavity is severely enlarged in size, normal wall thickness and moderately reduced systolic function. Tricuspid annular plane systolic excursion (TAPSE) is 1.2 cm (normal >=1.7 cm).     Left Atrium:  The left atrium is normal with an indexed volume of 14.69 ml/m².     Right Atrium:  The right atrium is severely dilated in size with an indexed volume of 53.17 ml/m². There is a pacer lead seen in the right atrium.     Interatrial Septum:  The interatrial septum is stretched and displaced to the left, consistent with right atrial volume overload.     Aortic Valve:  The aortic valve appears trileaflet. There is fibrocalcific aortic valve sclerosis without stenosis.     Mitral Valve:  There is calcification of the mitral valve annulus. There is symmetric leaflet tethering. There is trace mitral regurgitation.     Tricuspid Valve:  Structurally normal tricuspid valve with normal leaflet excursion. There is moderate to severe tricuspid regurgitation. Estimated pulmonary artery systolic pressure is 74 mmHg, consistent with severe pulmonary hypertension.     Pulmonic Valve:  Structurally normal pulmonic valve with normal leaflet excursion. There is trace pulmonic regurgitation.     Pericardium:  No pericardial effusion seen.     Systemic Veins:  The inferior vena cava is dilated measuring 2.30 cm in diameter, (dilated >2.1cm) with abnormal inspiratory collapse (abnormal <50%) consistent with elevated right atrial pressure (~15, range 10-20mmHg).  ____________________________________________________________________  QUANTITATIVE DATA:  Left Ventricle Measurements: (Indexed to BSA)     IVSd (2D):   1.1 cm  LVPWd (2D):  0.8 cm  LVIDd (2D):  5.9 cm  LVIDs (2D):  4.7 cm  LV Mass:     219 g  101.9 g/m²  BiPlane LV EF%: 54 %     MV E Vmax:    0.68 m/s  MV A Vmax:    0.85 m/s  MV E/A:       0.80  e' lateral:   7.40 cm/s  e' medial:    4.68 cm/s  E/e' lateral: 9.20  E/e' medial:  14.55  E/e' Average: 11.27    Aorta Measurements: (normal range) (Indexed to BSA)    Sinuses of Valsalva: 3.50 cm (3.1 - 3.7 cm)       Left Atrium Measurements: (Indexed to BSA)  LA Diam 2D: 4.40 cm    Right Ventricle Measurements: Right Atrial Measurements:     TAPSE:           1.2 cm       RA Vol:       114.00 ml  TV Rosemary. S': 11.20 cm/s   RA Vol Index: 53.17 ml/m²  RV Base (RVID1): 5.4 cm  RV Mid (RVID2):  4.7 cm       LVOT / RVOT/ Qp/Qs Data: (Indexed to BSA)  LVOT Diameter: 2.20 cm  LVOT Vmax:     0.85 m/s  LVOT VTI:      17.80 cm  LVOT SV:       67.7 ml  31.56 ml/m²    Mitral Valve Measurements:     MV E Vmax: 0.7 m/s  MV A Vmax: 0.8 m/s  MV E/A:    0.8       Tricuspid Valve Measurements:     TR Vmax:          3.8 m/s  TR Peak Gradient: 58.7 mmHg  RA Pressure:      15 mmHg  PASP:             74 mmHg    ________________________________________________________________________________________  Electronically signed on 11/20/2023 at 2:08:49 PM by Chon Sharp M.D.         *** Final ***    < end of copied text >

## 2023-11-20 NOTE — PROGRESS NOTE ADULT - ASSESSMENT
78M hypertension, CAD s/p stent, HFrEF, severe pulmonary hypertension, diabetes, DVT/PE on Eliquis, CKD, s/p PPM ([employer]) presenting with fall in setting of acute on chronic dyspnea on exertion 2/2 HF/pHTN exacerbation.     Plan:    #Syncope: unclear etiology  - CT Head negative  - Device interrogated in ED with no arrhythmias according to patient, f/u official report  - Monitor on tele  - Monitor orthostatic's  - Fall precautions  - PT eval    # HFrEF exacerbation: BNP 6411  - Echo pending  - Monitor I/O's. Daily weights  - Fluid restiction  - C/w diuresis per Cards  - Cards eval pending    # R 4th finger dislocation s/p fall:  - XR w/ Acute dislocation with associated avulsion fracture involving the right   fourth PIP joint   - S/p reduction in ED  - Pain control    # CAD/ HTN/ HLD:  - Trend BP's  - C/w current meds    # CKD:  - Monitor I/O's  - Serial Cr  - Avoid nephrotoxins  - Renally dose medications  - Renal consult pending    # DM2:   - HgbA1c  - Continue to monitor blood glucose levels  - Sliding Scale    # Depression  - C/w current meds    # Anemia:  - Serial cbc's  - Transfuse prn    # BPH:  - C/w current meds    # GI ppx:  - Bowel regimen prn    # DVT ppx:  - IPC's  - Eliquis    [facility]  [phone]

## 2023-11-20 NOTE — CONSULT NOTE ADULT - ASSESSMENT
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     #Fall  -Etiology unclear   -Per pt, no cardiac prodrome prior  -EKG SR  -CTH no significant findings  -Tele monitor remains SR - cont to monitor  -Interrogate ppm  -Check orthostatics  -Check echo    #HFrEF s/p ppm  -Overloaded on exam  -Continue 40mg iv lasix bid  -Daily weight/I&Os  -Wean supplemental O2 as able  -Continue coreg  -Off ace/arb likely i/s/o ckd   -Check echo   -Interrogate device     #CAD s/p stent  -Cont asa, statin, ranexa    #Severe pHTN  -s/p recent RHC  -Continue ambrisentan, sildenafil  -F/u pulm    #Hx DVT/PE  -Continue eliquis     #CKD   -Renal f/u     #R 4th finger dislocation s/p fall  -S/p reduction in ED

## 2023-11-20 NOTE — PHYSICAL THERAPY INITIAL EVALUATION ADULT - PLANNED THERAPY INTERVENTIONS, PT EVAL
Stairs: GOAL: Pt will ascend/descend 5 stairs independently using unilateral rail in order to return home safety in 2 weeks./balance training/gait training/strengthening/transfer training

## 2023-11-20 NOTE — PHYSICAL THERAPY INITIAL EVALUATION ADULT - NSPTDISCHREC_GEN_A_CORE
HPT, to improve all bed mobility, transfers and ambulation, improve pt strength and endurance, and optimize safety, and decreased falls risk./Home PT

## 2023-11-21 ENCOUNTER — TRANSCRIPTION ENCOUNTER (OUTPATIENT)
Age: 78
End: 2023-11-21

## 2023-11-21 LAB
ALBUMIN SERPL ELPH-MCNC: 3.4 G/DL — SIGNIFICANT CHANGE UP (ref 3.3–5)
ALBUMIN SERPL ELPH-MCNC: 3.4 G/DL — SIGNIFICANT CHANGE UP (ref 3.3–5)
ALP SERPL-CCNC: 90 U/L — SIGNIFICANT CHANGE UP (ref 40–120)
ALP SERPL-CCNC: 90 U/L — SIGNIFICANT CHANGE UP (ref 40–120)
ALT FLD-CCNC: 26 U/L — SIGNIFICANT CHANGE UP (ref 10–45)
ALT FLD-CCNC: 26 U/L — SIGNIFICANT CHANGE UP (ref 10–45)
ANION GAP SERPL CALC-SCNC: 13 MMOL/L — SIGNIFICANT CHANGE UP (ref 5–17)
ANION GAP SERPL CALC-SCNC: 13 MMOL/L — SIGNIFICANT CHANGE UP (ref 5–17)
APPEARANCE UR: CLEAR — SIGNIFICANT CHANGE UP
APPEARANCE UR: CLEAR — SIGNIFICANT CHANGE UP
AST SERPL-CCNC: 21 U/L — SIGNIFICANT CHANGE UP (ref 10–40)
AST SERPL-CCNC: 21 U/L — SIGNIFICANT CHANGE UP (ref 10–40)
BACTERIA # UR AUTO: NEGATIVE /HPF — SIGNIFICANT CHANGE UP
BACTERIA # UR AUTO: NEGATIVE /HPF — SIGNIFICANT CHANGE UP
BASOPHILS # BLD AUTO: 0.03 K/UL — SIGNIFICANT CHANGE UP (ref 0–0.2)
BASOPHILS # BLD AUTO: 0.03 K/UL — SIGNIFICANT CHANGE UP (ref 0–0.2)
BASOPHILS NFR BLD AUTO: 0.6 % — SIGNIFICANT CHANGE UP (ref 0–2)
BASOPHILS NFR BLD AUTO: 0.6 % — SIGNIFICANT CHANGE UP (ref 0–2)
BILIRUB SERPL-MCNC: 0.7 MG/DL — SIGNIFICANT CHANGE UP (ref 0.2–1.2)
BILIRUB SERPL-MCNC: 0.7 MG/DL — SIGNIFICANT CHANGE UP (ref 0.2–1.2)
BILIRUB UR-MCNC: NEGATIVE — SIGNIFICANT CHANGE UP
BILIRUB UR-MCNC: NEGATIVE — SIGNIFICANT CHANGE UP
BUN SERPL-MCNC: 57 MG/DL — HIGH (ref 7–23)
BUN SERPL-MCNC: 57 MG/DL — HIGH (ref 7–23)
CALCIUM SERPL-MCNC: 9 MG/DL — SIGNIFICANT CHANGE UP (ref 8.4–10.5)
CALCIUM SERPL-MCNC: 9 MG/DL — SIGNIFICANT CHANGE UP (ref 8.4–10.5)
CAST: 1 /LPF — SIGNIFICANT CHANGE UP (ref 0–4)
CAST: 1 /LPF — SIGNIFICANT CHANGE UP (ref 0–4)
CHLORIDE SERPL-SCNC: 104 MMOL/L — SIGNIFICANT CHANGE UP (ref 96–108)
CHLORIDE SERPL-SCNC: 104 MMOL/L — SIGNIFICANT CHANGE UP (ref 96–108)
CO2 SERPL-SCNC: 22 MMOL/L — SIGNIFICANT CHANGE UP (ref 22–31)
CO2 SERPL-SCNC: 22 MMOL/L — SIGNIFICANT CHANGE UP (ref 22–31)
COLOR SPEC: YELLOW — SIGNIFICANT CHANGE UP
COLOR SPEC: YELLOW — SIGNIFICANT CHANGE UP
CREAT ?TM UR-MCNC: 38 MG/DL — SIGNIFICANT CHANGE UP
CREAT ?TM UR-MCNC: 38 MG/DL — SIGNIFICANT CHANGE UP
CREAT SERPL-MCNC: 3.35 MG/DL — HIGH (ref 0.5–1.3)
CREAT SERPL-MCNC: 3.35 MG/DL — HIGH (ref 0.5–1.3)
DIFF PNL FLD: NEGATIVE — SIGNIFICANT CHANGE UP
DIFF PNL FLD: NEGATIVE — SIGNIFICANT CHANGE UP
EGFR: 18 ML/MIN/1.73M2 — LOW
EGFR: 18 ML/MIN/1.73M2 — LOW
EOSINOPHIL # BLD AUTO: 0.18 K/UL — SIGNIFICANT CHANGE UP (ref 0–0.5)
EOSINOPHIL # BLD AUTO: 0.18 K/UL — SIGNIFICANT CHANGE UP (ref 0–0.5)
EOSINOPHIL NFR BLD AUTO: 3.6 % — SIGNIFICANT CHANGE UP (ref 0–6)
EOSINOPHIL NFR BLD AUTO: 3.6 % — SIGNIFICANT CHANGE UP (ref 0–6)
GLUCOSE BLDC GLUCOMTR-MCNC: 179 MG/DL — HIGH (ref 70–99)
GLUCOSE BLDC GLUCOMTR-MCNC: 179 MG/DL — HIGH (ref 70–99)
GLUCOSE BLDC GLUCOMTR-MCNC: 186 MG/DL — HIGH (ref 70–99)
GLUCOSE BLDC GLUCOMTR-MCNC: 186 MG/DL — HIGH (ref 70–99)
GLUCOSE BLDC GLUCOMTR-MCNC: 97 MG/DL — SIGNIFICANT CHANGE UP (ref 70–99)
GLUCOSE SERPL-MCNC: 101 MG/DL — HIGH (ref 70–99)
GLUCOSE SERPL-MCNC: 101 MG/DL — HIGH (ref 70–99)
GLUCOSE UR QL: >=1000 MG/DL
GLUCOSE UR QL: >=1000 MG/DL
HCT VFR BLD CALC: 37.8 % — LOW (ref 39–50)
HCT VFR BLD CALC: 37.8 % — LOW (ref 39–50)
HGB BLD-MCNC: 12.5 G/DL — LOW (ref 13–17)
HGB BLD-MCNC: 12.5 G/DL — LOW (ref 13–17)
IMM GRANULOCYTES NFR BLD AUTO: 0.2 % — SIGNIFICANT CHANGE UP (ref 0–0.9)
IMM GRANULOCYTES NFR BLD AUTO: 0.2 % — SIGNIFICANT CHANGE UP (ref 0–0.9)
KETONES UR-MCNC: NEGATIVE MG/DL — SIGNIFICANT CHANGE UP
KETONES UR-MCNC: NEGATIVE MG/DL — SIGNIFICANT CHANGE UP
LEUKOCYTE ESTERASE UR-ACNC: NEGATIVE — SIGNIFICANT CHANGE UP
LEUKOCYTE ESTERASE UR-ACNC: NEGATIVE — SIGNIFICANT CHANGE UP
LYMPHOCYTES # BLD AUTO: 0.54 K/UL — LOW (ref 1–3.3)
LYMPHOCYTES # BLD AUTO: 0.54 K/UL — LOW (ref 1–3.3)
LYMPHOCYTES # BLD AUTO: 10.8 % — LOW (ref 13–44)
LYMPHOCYTES # BLD AUTO: 10.8 % — LOW (ref 13–44)
MAGNESIUM SERPL-MCNC: 2.2 MG/DL — SIGNIFICANT CHANGE UP (ref 1.6–2.6)
MAGNESIUM SERPL-MCNC: 2.2 MG/DL — SIGNIFICANT CHANGE UP (ref 1.6–2.6)
MCHC RBC-ENTMCNC: 33.1 GM/DL — SIGNIFICANT CHANGE UP (ref 32–36)
MCHC RBC-ENTMCNC: 33.1 GM/DL — SIGNIFICANT CHANGE UP (ref 32–36)
MCHC RBC-ENTMCNC: 33.8 PG — SIGNIFICANT CHANGE UP (ref 27–34)
MCHC RBC-ENTMCNC: 33.8 PG — SIGNIFICANT CHANGE UP (ref 27–34)
MCV RBC AUTO: 102.2 FL — HIGH (ref 80–100)
MCV RBC AUTO: 102.2 FL — HIGH (ref 80–100)
MONOCYTES # BLD AUTO: 0.62 K/UL — SIGNIFICANT CHANGE UP (ref 0–0.9)
MONOCYTES # BLD AUTO: 0.62 K/UL — SIGNIFICANT CHANGE UP (ref 0–0.9)
MONOCYTES NFR BLD AUTO: 12.4 % — SIGNIFICANT CHANGE UP (ref 2–14)
MONOCYTES NFR BLD AUTO: 12.4 % — SIGNIFICANT CHANGE UP (ref 2–14)
MRSA PCR RESULT.: SIGNIFICANT CHANGE UP
MRSA PCR RESULT.: SIGNIFICANT CHANGE UP
NEUTROPHILS # BLD AUTO: 3.61 K/UL — SIGNIFICANT CHANGE UP (ref 1.8–7.4)
NEUTROPHILS # BLD AUTO: 3.61 K/UL — SIGNIFICANT CHANGE UP (ref 1.8–7.4)
NEUTROPHILS NFR BLD AUTO: 72.4 % — SIGNIFICANT CHANGE UP (ref 43–77)
NEUTROPHILS NFR BLD AUTO: 72.4 % — SIGNIFICANT CHANGE UP (ref 43–77)
NITRITE UR-MCNC: NEGATIVE — SIGNIFICANT CHANGE UP
NITRITE UR-MCNC: NEGATIVE — SIGNIFICANT CHANGE UP
NRBC # BLD: 0 /100 WBCS — SIGNIFICANT CHANGE UP (ref 0–0)
NRBC # BLD: 0 /100 WBCS — SIGNIFICANT CHANGE UP (ref 0–0)
PH UR: 5.5 — SIGNIFICANT CHANGE UP (ref 5–8)
PH UR: 5.5 — SIGNIFICANT CHANGE UP (ref 5–8)
PHOSPHATE SERPL-MCNC: 4.5 MG/DL — SIGNIFICANT CHANGE UP (ref 2.5–4.5)
PHOSPHATE SERPL-MCNC: 4.5 MG/DL — SIGNIFICANT CHANGE UP (ref 2.5–4.5)
PLATELET # BLD AUTO: 135 K/UL — LOW (ref 150–400)
PLATELET # BLD AUTO: 135 K/UL — LOW (ref 150–400)
POTASSIUM SERPL-MCNC: 4.1 MMOL/L — SIGNIFICANT CHANGE UP (ref 3.5–5.3)
POTASSIUM SERPL-MCNC: 4.1 MMOL/L — SIGNIFICANT CHANGE UP (ref 3.5–5.3)
POTASSIUM SERPL-SCNC: 4.1 MMOL/L — SIGNIFICANT CHANGE UP (ref 3.5–5.3)
POTASSIUM SERPL-SCNC: 4.1 MMOL/L — SIGNIFICANT CHANGE UP (ref 3.5–5.3)
PROT ?TM UR-MCNC: 43 MG/DL — HIGH (ref 0–12)
PROT ?TM UR-MCNC: 43 MG/DL — HIGH (ref 0–12)
PROT SERPL-MCNC: 5.6 G/DL — LOW (ref 6–8.3)
PROT SERPL-MCNC: 5.6 G/DL — LOW (ref 6–8.3)
PROT UR-MCNC: 30 MG/DL
PROT UR-MCNC: 30 MG/DL
PROT/CREAT UR-RTO: 1.1 RATIO — HIGH (ref 0–0.2)
PROT/CREAT UR-RTO: 1.1 RATIO — HIGH (ref 0–0.2)
RBC # BLD: 3.7 M/UL — LOW (ref 4.2–5.8)
RBC # BLD: 3.7 M/UL — LOW (ref 4.2–5.8)
RBC # FLD: 15.6 % — HIGH (ref 10.3–14.5)
RBC # FLD: 15.6 % — HIGH (ref 10.3–14.5)
RBC CASTS # UR COMP ASSIST: 0 /HPF — SIGNIFICANT CHANGE UP (ref 0–4)
RBC CASTS # UR COMP ASSIST: 0 /HPF — SIGNIFICANT CHANGE UP (ref 0–4)
S AUREUS DNA NOSE QL NAA+PROBE: SIGNIFICANT CHANGE UP
S AUREUS DNA NOSE QL NAA+PROBE: SIGNIFICANT CHANGE UP
SODIUM SERPL-SCNC: 139 MMOL/L — SIGNIFICANT CHANGE UP (ref 135–145)
SODIUM SERPL-SCNC: 139 MMOL/L — SIGNIFICANT CHANGE UP (ref 135–145)
SP GR SPEC: 1.01 — SIGNIFICANT CHANGE UP (ref 1–1.03)
SP GR SPEC: 1.01 — SIGNIFICANT CHANGE UP (ref 1–1.03)
SQUAMOUS # UR AUTO: 0 /HPF — SIGNIFICANT CHANGE UP (ref 0–5)
SQUAMOUS # UR AUTO: 0 /HPF — SIGNIFICANT CHANGE UP (ref 0–5)
UROBILINOGEN FLD QL: 1 MG/DL — SIGNIFICANT CHANGE UP (ref 0.2–1)
UROBILINOGEN FLD QL: 1 MG/DL — SIGNIFICANT CHANGE UP (ref 0.2–1)
WBC # BLD: 4.99 K/UL — SIGNIFICANT CHANGE UP (ref 3.8–10.5)
WBC # BLD: 4.99 K/UL — SIGNIFICANT CHANGE UP (ref 3.8–10.5)
WBC # FLD AUTO: 4.99 K/UL — SIGNIFICANT CHANGE UP (ref 3.8–10.5)
WBC # FLD AUTO: 4.99 K/UL — SIGNIFICANT CHANGE UP (ref 3.8–10.5)
WBC UR QL: 0 /HPF — SIGNIFICANT CHANGE UP (ref 0–5)
WBC UR QL: 0 /HPF — SIGNIFICANT CHANGE UP (ref 0–5)

## 2023-11-21 PROCEDURE — 93280 PM DEVICE PROGR EVAL DUAL: CPT | Mod: 26

## 2023-11-21 RX ORDER — METOPROLOL TARTRATE 50 MG
12.5 TABLET ORAL
Refills: 0 | Status: DISCONTINUED | OUTPATIENT
Start: 2023-11-21 | End: 2023-11-22

## 2023-11-21 RX ADMIN — ESCITALOPRAM OXALATE 10 MILLIGRAM(S): 10 TABLET, FILM COATED ORAL at 12:09

## 2023-11-21 RX ADMIN — Medication 20 MILLIGRAM(S): at 21:19

## 2023-11-21 RX ADMIN — LORATADINE 10 MILLIGRAM(S): 10 TABLET ORAL at 12:09

## 2023-11-21 RX ADMIN — Medication 1 PUFF(S): at 23:22

## 2023-11-21 RX ADMIN — Medication 3 MILLIGRAM(S): at 00:30

## 2023-11-21 RX ADMIN — RANOLAZINE 1000 MILLIGRAM(S): 500 TABLET, FILM COATED, EXTENDED RELEASE ORAL at 18:26

## 2023-11-21 RX ADMIN — Medication 12.5 MILLIGRAM(S): at 18:26

## 2023-11-21 RX ADMIN — Medication 40 MILLIGRAM(S): at 05:09

## 2023-11-21 RX ADMIN — CALCITRIOL 0.25 MICROGRAM(S): 0.5 CAPSULE ORAL at 12:08

## 2023-11-21 RX ADMIN — Medication 81 MILLIGRAM(S): at 12:08

## 2023-11-21 RX ADMIN — Medication 1: at 17:15

## 2023-11-21 RX ADMIN — TAMSULOSIN HYDROCHLORIDE 0.4 MILLIGRAM(S): 0.4 CAPSULE ORAL at 21:18

## 2023-11-21 RX ADMIN — Medication 1 TABLET(S): at 12:09

## 2023-11-21 RX ADMIN — CARVEDILOL PHOSPHATE 3.12 MILLIGRAM(S): 80 CAPSULE, EXTENDED RELEASE ORAL at 05:09

## 2023-11-21 RX ADMIN — APIXABAN 5 MILLIGRAM(S): 2.5 TABLET, FILM COATED ORAL at 05:09

## 2023-11-21 RX ADMIN — Medication 1 PUFF(S): at 05:09

## 2023-11-21 RX ADMIN — Medication 20 MILLIGRAM(S): at 05:09

## 2023-11-21 RX ADMIN — APIXABAN 5 MILLIGRAM(S): 2.5 TABLET, FILM COATED ORAL at 18:26

## 2023-11-21 RX ADMIN — Medication 325 MILLIGRAM(S): at 12:08

## 2023-11-21 RX ADMIN — Medication 1 PUFF(S): at 12:09

## 2023-11-21 RX ADMIN — INSULIN GLARGINE 34 UNIT(S): 100 INJECTION, SOLUTION SUBCUTANEOUS at 21:51

## 2023-11-21 RX ADMIN — Medication 1 PUFF(S): at 00:29

## 2023-11-21 RX ADMIN — Medication 3 MILLIGRAM(S): at 23:22

## 2023-11-21 RX ADMIN — ATORVASTATIN CALCIUM 40 MILLIGRAM(S): 80 TABLET, FILM COATED ORAL at 21:18

## 2023-11-21 RX ADMIN — Medication 20 MILLIGRAM(S): at 15:35

## 2023-11-21 RX ADMIN — Medication 1 PUFF(S): at 18:26

## 2023-11-21 RX ADMIN — RANOLAZINE 1000 MILLIGRAM(S): 500 TABLET, FILM COATED, EXTENDED RELEASE ORAL at 05:12

## 2023-11-21 RX ADMIN — AMBRISENTAN 10 MILLIGRAM(S): 10 TABLET, FILM COATED ORAL at 12:08

## 2023-11-21 RX ADMIN — CHLORHEXIDINE GLUCONATE 1 APPLICATION(S): 213 SOLUTION TOPICAL at 05:14

## 2023-11-21 NOTE — PROGRESS NOTE ADULT - ASSESSMENT
78M hypertension, CAD s/p stent, HFrEF, severe pulmonary hypertension, diabetes, DVT/PE on Eliquis, CKD, s/p PPM ([employer]) presenting with fall in setting of acute on chronic dyspnea on exertion 2/2 HF/pHTN exacerbation.     Plan:    #Syncope: unclear etiology  - CT Head negative  - Device interrogated in ED with no arrhythmias according to patient, f/u official report  - Monitor on tele  - Orthostatics positive   - Fall precautions  - PT eval    # Orthostatic Hypotension:  - Compression socks  - Avoid IVF as he is overloaded    # HFrEF exacerbation: BNP 6411  - Echo w/ moderately reduced RV fxn, nml LV fxn, Severe TR, severe pHTN  - Monitor I/O's. Daily weights  - Fluid restriction  - C/w diuresis per Cards  - Cards following    # R 4th finger dislocation s/p fall:  - XR w/ Acute dislocation with associated avulsion fracture involving the right   fourth PIP joint   - S/p reduction in ED  - Pain control    # CAD/ HTN/ HLD:  - Trend BP's  - C/w current meds    # CKD:  - Baseline Cr 3-3.5  - Monitor I/O's  - Serial Cr  - Avoid nephrotoxins  - Renally dose medications  - Renal following    # DM2:   - HgbA1c 7.0%  - Continue to monitor blood glucose levels  - Sliding Scale    # Depression  - C/w current meds    # Anemia:  - Serial cbc's  - Transfuse prn    # BPH:  - C/w current meds    # GI ppx:  - Bowel regimen prn    # DVT ppx:  - IPC's  - Eliquis    [facility]

## 2023-11-21 NOTE — DISCHARGE NOTE PROVIDER - CARE PROVIDER_API CALL
Nohemy Sandra  Internal Medicine  [address] [place] [address]  Phone: [phone]  Fax: [phone]  Follow Up Time:     Iglesia Head  Cardiovascular Disease  [address], [address]  [place] [address]  Phone: [phone]  Fax: [phone]  Follow Up Time:     Abdoul Nelson  Nephrology  [address], [address]  [place] [address]  Phone: [phone]  Fax: [phone]  Follow Up Time:

## 2023-11-21 NOTE — PROGRESS NOTE ADULT - ASSESSMENT
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 Pt was getting out of the car to walk to the store and took a few steps when he yelled out for his wife. He doesn't remember falling, but woke up on the ground. By the time his wife, who was in the car, turned, he was already on the ground. He hit his head and R shoulder/elbow/hand and was bleeding. He had a rapid return to consciousness with no drowsiness afterwards. Family states that this was over the course of seconds.  Pt states in recent days he has been having worsening dyspnea on exertion, but denies any other symptoms. In the ED, pt hemodynamically stable. Found to have R 4th digit PIP dislocation, reduced by ED. Pt was started on oxygen 2L NC for SaO2 92%. Renal consult called       1- CKDIV  2- CHF  3- syncope  4- shpt  5- bph      baseline CKDIV, creatinine range ~3-3.5  check U/A and urine protein/creatinine ratio  continue lasix 40 mg iv bid today  orthostatics +  check pth, continue calcitroil 0.25 mcg daily  continue flomax 0.4 mg daily  strict I/O  daily standing weight  trend creatinine and electrolytes daily

## 2023-11-21 NOTE — PROCEDURE NOTE - ADDITIONAL PROCEDURE DETAILS
1. Battery longevity 15 years  2. Lead impedances WNL  3. Sensing/pacing thresholds WNL  4. No arrhythmia episodes to correlate with syncopal episode on 11/20/23 around 12 noon  5. Apaced <1%, Vpaced <1%, AF burden 0  6. Atrial output adjusted to provide safety margin  7. Normal pacemaker function    #[id number]

## 2023-11-21 NOTE — OCCUPATIONAL THERAPY INITIAL EVALUATION ADULT - BALANCE TRAINING, PT EVAL
GOAL: Pt will demonstrate improved balance while completing grooming task at sink independently in 2 weeks.

## 2023-11-21 NOTE — DISCHARGE NOTE PROVIDER - NSDCMRMEDTOKEN_GEN_ALL_CORE_FT
Allegra 60 mg oral tablet: 1 tab(s) orally once a day  ambrisentan 10 mg oral tablet: 1 tab(s) orally once a day  aspirin 81 mg oral delayed release tablet: 1 tab(s) orally once a day  Atrovent HFA 17 mcg/inh inhalation aerosol: 2 puff(s) by metered dose inhaler 3 times a day as needed  Benadryl 25 mg oral tablet: 2 tab(s) orally once a day  calcitriol 0.25 mcg oral capsule: 1 cap(s) orally 2 times a week  carvedilol 3.125 mg oral tablet: 1 tab(s) orally 2 times a day  cholecalciferol 1250 mcg (50,000 intl units) oral capsule: 1 cap(s) orally 2 times a week (Tues &amp; Thurs)  Eliquis 5 mg oral tablet: 1 tab(s) orally once a day  escitalopram 10 mg oral tablet: 1 tab(s) orally once a day  ferrous sulfate 325 mg (65 mg elemental iron) oral delayed release tablet: 1 tab(s) orally once a day  furosemide 20 mg oral tablet: 1 tab(s) orally once a day  Jardiance 25 mg oral tablet: 1 tab(s) orally once a day  Lantus 100 units/mL subcutaneous solution: 42 unit(s) subcutaneous once a day  Nephro-Stacy oral tablet: 1 tab(s) orally once a day  ranolazine 1000 mg oral tablet, extended release: 1 tab(s) orally 2 times a day  rosuvastatin 10 mg oral tablet: 1 tab(s) orally once a day (at bedtime)  sildenafil 20 mg oral tablet: 3 tab(s) orally 3 times a day  tamsulosin 0.4 mg oral capsule: 1 cap(s) orally once a day   Allegra 60 mg oral tablet: 1 tab(s) orally once a day  ambrisentan 10 mg oral tablet: 1 tab(s) orally once a day  aspirin 81 mg oral delayed release tablet: 1 tab(s) orally once a day  Atrovent HFA 17 mcg/inh inhalation aerosol: 2 puff(s) by metered dose inhaler 3 times a day as needed  Benadryl 25 mg oral tablet: 2 tab(s) orally once a day as needed for  itching  calcitriol 0.25 mcg oral capsule: 1 cap(s) orally once a day  cholecalciferol 1250 mcg (50,000 intl units) oral capsule: 1 cap(s) orally 2 times a week (Tues &amp; Thurs)  Eliquis 5 mg oral tablet: 1 tab(s) orally 2 times a day  escitalopram 10 mg oral tablet: 1 tab(s) orally once a day  ferrous sulfate 325 mg (65 mg elemental iron) oral delayed release tablet: 1 tab(s) orally once a day  furosemide 40 mg oral tablet: 1 tab(s) orally once a day  Jardiance 25 mg oral tablet: 1 tab(s) orally once a day  Lantus 100 units/mL subcutaneous solution: 42 unit(s) subcutaneous once a day  Metoprolol Tartrate 25 mg oral tablet: 0.5 tab(s) orally 2 times a day  Nephro-Stacy oral tablet: 1 tab(s) orally once a day  Physical Therapy: 3 times a week  ranolazine 1000 mg oral tablet, extended release: 1 tab(s) orally 2 times a day  rosuvastatin 10 mg oral tablet: 1 tab(s) orally once a day (at bedtime)  sildenafil 20 mg oral tablet: 1 tab(s) orally 3 times a day  tamsulosin 0.4 mg oral capsule: 1 cap(s) orally once a day   Allegra 60 mg oral tablet: 1 tab(s) orally once a day  ambrisentan 10 mg oral tablet: 1 tab(s) orally once a day  aspirin 81 mg oral delayed release tablet: 1 tab(s) orally once a day  Atrovent HFA 17 mcg/inh inhalation aerosol: 2 puff(s) by metered dose inhaler 3 times a day as needed  Benadryl 25 mg oral tablet: 2 tab(s) orally once a day as needed for  itching  calcitriol 0.25 mcg oral capsule: 1 cap(s) orally once a day  cholecalciferol 1250 mcg (50,000 intl units) oral capsule: 1 cap(s) orally 2 times a week (Tues &amp; Thurs)  Eliquis 5 mg oral tablet: 1 tab(s) orally 2 times a day  escitalopram 10 mg oral tablet: 1 tab(s) orally once a day  ferrous sulfate 325 mg (65 mg elemental iron) oral delayed release tablet: 1 tab(s) orally once a day  furosemide 40 mg oral tablet: 1 tab(s) orally once a day  Jardiance 25 mg oral tablet: 1 tab(s) orally once a day  Lantus 100 units/mL subcutaneous solution: 42 unit(s) subcutaneous once a day  Metoprolol Tartrate 25 mg oral tablet: 0.5 tab(s) orally 2 times a day  Nephro-Stacy oral tablet: 1 tab(s) orally once a day  ranolazine 1000 mg oral tablet, extended release: 1 tab(s) orally 2 times a day  rosuvastatin 10 mg oral tablet: 1 tab(s) orally once a day (at bedtime)  sildenafil 20 mg oral tablet: 1 tab(s) orally 3 times a day  tamsulosin 0.4 mg oral capsule: 1 cap(s) orally once a day

## 2023-11-21 NOTE — OCCUPATIONAL THERAPY INITIAL EVALUATION ADULT - PATIENT/FAMILY/SIGNIFICANT OTHER GOALS STATEMENT, OT EVAL
ALVARO WHYTE Key: [id number] - PA Case ID: [id number] - Rx #: [id number] help? Call us at [phone]  Outcome  Approvedon June 1  PA Case: [id number], Status: Approved, Coverage Starts on: 1/1/2022 12:00:00 AM, Coverage Ends on: 12/31/2022 12:00:00 AM. Questions? Contact [phone].  Drug  Contour Next Test strips  Form  [employer] Electronic PA Form  Original Claim Info  [id number] Provide Notice: Medicare Prescription Drug Coverage and Your Rights  
To get better

## 2023-11-21 NOTE — OCCUPATIONAL THERAPY INITIAL EVALUATION ADULT - LIVES WITH, PROFILE
Pt reports living with wife + PH + 1STE  + 1 flight of steps with chair lift+walk-in shower with shower chair and grab bars. Reports beinf independent with all ADLs and mobility PTA. Owns a RW+ cane but does not use./spouse

## 2023-11-21 NOTE — DISCHARGE NOTE PROVIDER - CARE PROVIDERS DIRECT ADDRESSES
[email or web address],DirectAddress_Unknown

## 2023-11-21 NOTE — PROGRESS NOTE ADULT - ASSESSMENT
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     #Fall  -Etiology unclear   -Per pt, no cardiac prodrome prior  -EKG SR  -CTH no significant findings  -Tele monitor remains SR - cont to monitor  -Interrogate ppm  -Echo w/ moderately reduced RV fxn, nml LV fxn, Severe TR, severe pHTN  -Orthostatics positive    #Orthostatic Hypotension  -Allow permissive htn  -PT for mobility  -Compression socks  -Avoid IVF as he is overloaded on exam    #HFrEF s/p ppm  -Overloaded on exam - improving with diuresis  -Continue 40mg iv lasix bid  -Likely transition to PO lasix tomorrow  -Daily weight/I&Os  -Wean supplemental O2  -Continue coreg  -Off ace/arb likely i/s/o ckd   -Echo as above   -Interrogate device     #CAD s/p stent  -Cont asa, statin, ranexa    #Severe pHTN  -s/p recent RHC  -Continue ambrisentan, sildenafil  -F/u pulm    #Hx DVT/PE  -Continue eliquis     #CKD   -Renal f/u     #R 4th finger dislocation s/p fall  -S/p reduction in ED

## 2023-11-21 NOTE — DISCHARGE NOTE PROVIDER - PROVIDER TOKENS
PROVIDER:[TOKEN:[[id number]]],PROVIDER:[TOKEN:[[id number]]],PROVIDER:[TOKEN:[[id number]]]

## 2023-11-21 NOTE — DISCHARGE NOTE PROVIDER - DISCHARGE DATE
Anemia improving  Blood sugar uncontrolled  Check blood sugar regularly  Make appointment 1 week
Pt notified and verbalized understanding, follow up scheduled for 11/14/19
22-Nov-2023

## 2023-11-21 NOTE — OCCUPATIONAL THERAPY INITIAL EVALUATION ADULT - PERTINENT HX OF CURRENT PROBLEM, REHAB EVAL
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 Pt was getting out of the car to walk to the store and took a few steps when he yelled out for his wife. He doesn't remember falling, but woke up on the ground. By the time his wife, who was in the car, turned, he was already on the ground. He hit his head and R shoulder/elbow/hand and was bleeding. He had a rapid return to consciousness with no drowsiness afterwards. Family states that this was over the course of seconds.  Pt states in recent days he has been having worsening dyspnea on exertion, but denies any other symptoms. In the ED, pt hemodynamically stable. Found to have R 4th digit PIP dislocation, reduced by ED. Pt was started on oxygen 2L NC for SaO2 92%. Does not use oxygen at home.  CT HEAD: No acute intracranial hemorrhage, mass effect or CT evidence of an acute infarct.Periventricular white matter attenuation changes consistent with chronic   microvascular ischemia as seen on the previous exam. XRAY R HAND: Status post reduction with improved anatomical alignment. Redemonstrated tiny ossific flecks in the palmar aspect of the right ring finger PIP joint which likely represents acute avulsion injury. XRAY ELBOW : No acute fracture or dislocation. No elbow joint effusion. XRAY CHEST: No focal consolidations.

## 2023-11-21 NOTE — DISCHARGE NOTE PROVIDER - NSDCCPCAREPLAN_GEN_ALL_CORE_FT
PRINCIPAL DISCHARGE DIAGNOSIS  Diagnosis: Syncope  Assessment and Plan of Treatment: You were admitted for syncope. CT head was negative. Device was interrogated which showed normal pacemaker function. Orthostatics were positive. Cardiology and nephrology were consulted. You were given some IV fluids and orthostatics normalized. You were placed on IV lasix, but transitioned to oral lasix. PT recommended home PT. Please follow up with your primary care physician, cardiologist and nephrologist after discharge for continued monitoring and management.

## 2023-11-21 NOTE — DISCHARGE NOTE PROVIDER - HOSPITAL COURSE
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 Pt was getting out of the car to walk to the store and took a few steps when he yelled out for his wife. He doesn't remember falling, but woke up on the ground. By the time his wife, who was in the car, turned, he was already on the ground. He hit his head and R shoulder/elbow/hand and was bleeding. He had a rapid return to consciousness with no drowsiness afterwards. Family states that this was over the course of seconds.  Pt states in recent days he has been having worsening dyspnea on exertion, but denies any other symptoms. In the ED, pt hemodynamically stable. Found to have R 4th digit PIP dislocation, reduced by ED. Pt was started on oxygen 2L NC for SaO2 92%. Does not use oxygen at home.      Patient admitted for syncope. CT head was negative. Device was interrogated which showed normal pacemaker function. Orthostatics were positive. Cardiology and nephrology were consulted. Patient was given some IV fluids and orthostatics normalized. Patient was placed on IV lasix, but transitioned to PO lasix. PT recommended home PT.     On 11/22/23 this case was reviewed with Dr. Webster, the patient is medically stable and optimized for discharge. All medications were reviewed and prescriptions were sent to mutually agreed upon pharmacy.

## 2023-11-22 ENCOUNTER — TRANSCRIPTION ENCOUNTER (OUTPATIENT)
Age: 78
End: 2023-11-22

## 2023-11-22 VITALS — OXYGEN SATURATION: 92 % | RESPIRATION RATE: 18 BRPM

## 2023-11-22 LAB
ANION GAP SERPL CALC-SCNC: 13 MMOL/L — SIGNIFICANT CHANGE UP (ref 5–17)
ANION GAP SERPL CALC-SCNC: 13 MMOL/L — SIGNIFICANT CHANGE UP (ref 5–17)
BUN SERPL-MCNC: 56 MG/DL — HIGH (ref 7–23)
BUN SERPL-MCNC: 56 MG/DL — HIGH (ref 7–23)
CALCIUM SERPL-MCNC: 8.5 MG/DL — SIGNIFICANT CHANGE UP (ref 8.4–10.5)
CALCIUM SERPL-MCNC: 8.5 MG/DL — SIGNIFICANT CHANGE UP (ref 8.4–10.5)
CALCIUM SERPL-MCNC: 8.7 MG/DL — SIGNIFICANT CHANGE UP (ref 8.4–10.5)
CALCIUM SERPL-MCNC: 8.7 MG/DL — SIGNIFICANT CHANGE UP (ref 8.4–10.5)
CHLORIDE SERPL-SCNC: 103 MMOL/L — SIGNIFICANT CHANGE UP (ref 96–108)
CHLORIDE SERPL-SCNC: 103 MMOL/L — SIGNIFICANT CHANGE UP (ref 96–108)
CO2 SERPL-SCNC: 22 MMOL/L — SIGNIFICANT CHANGE UP (ref 22–31)
CO2 SERPL-SCNC: 22 MMOL/L — SIGNIFICANT CHANGE UP (ref 22–31)
CREAT SERPL-MCNC: 3.5 MG/DL — HIGH (ref 0.5–1.3)
CREAT SERPL-MCNC: 3.5 MG/DL — HIGH (ref 0.5–1.3)
EGFR: 17 ML/MIN/1.73M2 — LOW
EGFR: 17 ML/MIN/1.73M2 — LOW
GLUCOSE BLDC GLUCOMTR-MCNC: 150 MG/DL — HIGH (ref 70–99)
GLUCOSE BLDC GLUCOMTR-MCNC: 150 MG/DL — HIGH (ref 70–99)
GLUCOSE BLDC GLUCOMTR-MCNC: 172 MG/DL — HIGH (ref 70–99)
GLUCOSE BLDC GLUCOMTR-MCNC: 172 MG/DL — HIGH (ref 70–99)
GLUCOSE BLDC GLUCOMTR-MCNC: 70 MG/DL — SIGNIFICANT CHANGE UP (ref 70–99)
GLUCOSE BLDC GLUCOMTR-MCNC: 70 MG/DL — SIGNIFICANT CHANGE UP (ref 70–99)
GLUCOSE SERPL-MCNC: 77 MG/DL — SIGNIFICANT CHANGE UP (ref 70–99)
GLUCOSE SERPL-MCNC: 77 MG/DL — SIGNIFICANT CHANGE UP (ref 70–99)
HCT VFR BLD CALC: 37.5 % — LOW (ref 39–50)
HCT VFR BLD CALC: 37.5 % — LOW (ref 39–50)
HGB BLD-MCNC: 12.4 G/DL — LOW (ref 13–17)
HGB BLD-MCNC: 12.4 G/DL — LOW (ref 13–17)
MCHC RBC-ENTMCNC: 33.1 GM/DL — SIGNIFICANT CHANGE UP (ref 32–36)
MCHC RBC-ENTMCNC: 33.1 GM/DL — SIGNIFICANT CHANGE UP (ref 32–36)
MCHC RBC-ENTMCNC: 33.9 PG — SIGNIFICANT CHANGE UP (ref 27–34)
MCHC RBC-ENTMCNC: 33.9 PG — SIGNIFICANT CHANGE UP (ref 27–34)
MCV RBC AUTO: 102.5 FL — HIGH (ref 80–100)
MCV RBC AUTO: 102.5 FL — HIGH (ref 80–100)
NRBC # BLD: 0 /100 WBCS — SIGNIFICANT CHANGE UP (ref 0–0)
NRBC # BLD: 0 /100 WBCS — SIGNIFICANT CHANGE UP (ref 0–0)
PLATELET # BLD AUTO: 136 K/UL — LOW (ref 150–400)
PLATELET # BLD AUTO: 136 K/UL — LOW (ref 150–400)
POTASSIUM SERPL-MCNC: 4.1 MMOL/L — SIGNIFICANT CHANGE UP (ref 3.5–5.3)
POTASSIUM SERPL-MCNC: 4.1 MMOL/L — SIGNIFICANT CHANGE UP (ref 3.5–5.3)
POTASSIUM SERPL-SCNC: 4.1 MMOL/L — SIGNIFICANT CHANGE UP (ref 3.5–5.3)
POTASSIUM SERPL-SCNC: 4.1 MMOL/L — SIGNIFICANT CHANGE UP (ref 3.5–5.3)
PTH-INTACT FLD-MCNC: 187 PG/ML — HIGH (ref 15–65)
PTH-INTACT FLD-MCNC: 187 PG/ML — HIGH (ref 15–65)
RBC # BLD: 3.66 M/UL — LOW (ref 4.2–5.8)
RBC # BLD: 3.66 M/UL — LOW (ref 4.2–5.8)
RBC # FLD: 15.5 % — HIGH (ref 10.3–14.5)
RBC # FLD: 15.5 % — HIGH (ref 10.3–14.5)
SODIUM SERPL-SCNC: 138 MMOL/L — SIGNIFICANT CHANGE UP (ref 135–145)
SODIUM SERPL-SCNC: 138 MMOL/L — SIGNIFICANT CHANGE UP (ref 135–145)
WBC # BLD: 5.01 K/UL — SIGNIFICANT CHANGE UP (ref 3.8–10.5)
WBC # BLD: 5.01 K/UL — SIGNIFICANT CHANGE UP (ref 3.8–10.5)
WBC # FLD AUTO: 5.01 K/UL — SIGNIFICANT CHANGE UP (ref 3.8–10.5)
WBC # FLD AUTO: 5.01 K/UL — SIGNIFICANT CHANGE UP (ref 3.8–10.5)

## 2023-11-22 RX ORDER — CALCITRIOL 0.5 UG/1
1 CAPSULE ORAL
Qty: 30 | Refills: 0
Start: 2023-11-22 | End: 2023-12-21

## 2023-11-22 RX ORDER — FUROSEMIDE 40 MG
20 TABLET ORAL ONCE
Refills: 0 | Status: COMPLETED | OUTPATIENT
Start: 2023-11-22 | End: 2023-11-22

## 2023-11-22 RX ORDER — FUROSEMIDE 40 MG
40 TABLET ORAL DAILY
Refills: 0 | Status: DISCONTINUED | OUTPATIENT
Start: 2023-11-23 | End: 2023-11-22

## 2023-11-22 RX ORDER — FUROSEMIDE 40 MG
1 TABLET ORAL
Refills: 0 | DISCHARGE

## 2023-11-22 RX ORDER — APIXABAN 2.5 MG/1
1 TABLET, FILM COATED ORAL
Qty: 0 | Refills: 0 | DISCHARGE

## 2023-11-22 RX ORDER — DIPHENHYDRAMINE HCL 50 MG
2 CAPSULE ORAL
Qty: 0 | Refills: 0 | DISCHARGE

## 2023-11-22 RX ORDER — METOPROLOL TARTRATE 50 MG
0.5 TABLET ORAL
Qty: 30 | Refills: 0
Start: 2023-11-22 | End: 2023-12-21

## 2023-11-22 RX ORDER — CALCITRIOL 0.5 UG/1
1 CAPSULE ORAL
Refills: 0 | DISCHARGE

## 2023-11-22 RX ORDER — FUROSEMIDE 40 MG
1 TABLET ORAL
Qty: 30 | Refills: 0
Start: 2023-11-22 | End: 2023-12-21

## 2023-11-22 RX ORDER — CARVEDILOL PHOSPHATE 80 MG/1
1 CAPSULE, EXTENDED RELEASE ORAL
Refills: 0 | DISCHARGE

## 2023-11-22 RX ORDER — INSULIN GLARGINE 100 [IU]/ML
28 INJECTION, SOLUTION SUBCUTANEOUS AT BEDTIME
Refills: 0 | Status: DISCONTINUED | OUTPATIENT
Start: 2023-11-22 | End: 2023-11-22

## 2023-11-22 RX ADMIN — CHLORHEXIDINE GLUCONATE 1 APPLICATION(S): 213 SOLUTION TOPICAL at 05:37

## 2023-11-22 RX ADMIN — RANOLAZINE 1000 MILLIGRAM(S): 500 TABLET, FILM COATED, EXTENDED RELEASE ORAL at 05:29

## 2023-11-22 RX ADMIN — AMBRISENTAN 10 MILLIGRAM(S): 10 TABLET, FILM COATED ORAL at 12:05

## 2023-11-22 RX ADMIN — APIXABAN 5 MILLIGRAM(S): 2.5 TABLET, FILM COATED ORAL at 05:29

## 2023-11-22 RX ADMIN — Medication 20 MILLIGRAM(S): at 12:04

## 2023-11-22 RX ADMIN — Medication 325 MILLIGRAM(S): at 12:04

## 2023-11-22 RX ADMIN — Medication 40 MILLIGRAM(S): at 05:29

## 2023-11-22 RX ADMIN — APIXABAN 5 MILLIGRAM(S): 2.5 TABLET, FILM COATED ORAL at 18:00

## 2023-11-22 RX ADMIN — Medication 1 TABLET(S): at 12:04

## 2023-11-22 RX ADMIN — Medication 20 MILLIGRAM(S): at 05:29

## 2023-11-22 RX ADMIN — Medication 12.5 MILLIGRAM(S): at 05:30

## 2023-11-22 RX ADMIN — Medication 1: at 12:03

## 2023-11-22 RX ADMIN — Medication 1 PUFF(S): at 12:09

## 2023-11-22 RX ADMIN — Medication 81 MILLIGRAM(S): at 12:04

## 2023-11-22 RX ADMIN — ESCITALOPRAM OXALATE 10 MILLIGRAM(S): 10 TABLET, FILM COATED ORAL at 12:04

## 2023-11-22 RX ADMIN — Medication 1 PUFF(S): at 05:30

## 2023-11-22 RX ADMIN — CALCITRIOL 0.25 MICROGRAM(S): 0.5 CAPSULE ORAL at 12:04

## 2023-11-22 RX ADMIN — LORATADINE 10 MILLIGRAM(S): 10 TABLET ORAL at 12:04

## 2023-11-22 NOTE — DISCHARGE NOTE NURSING/CASE MANAGEMENT/SOCIAL WORK - NSDCPEFALRISK_GEN_ALL_CORE
For information on Fall & Injury Prevention, visit: [email or web address] OR  [email or web address] OR  https://www.cdc.gov/steadi/patient.html

## 2023-11-22 NOTE — PHARMACOTHERAPY INTERVENTION NOTE - COMMENTS
RH is a 78y M with Type 2 DM, being managed on lantus 34 units QHS. Patient's POCT glucose is as follows....    CAPILLARY BLOOD GLUCOSE    POCT Blood Glucose.: 172 mg/dL (22 Nov 2023 11:56)  POCT Blood Glucose.: 70 mg/dL (22 Nov 2023 08:46)  POCT Blood Glucose.: 179 mg/dL (21 Nov 2023 21:35)  POCT Blood Glucose.: 186 mg/dL (21 Nov 2023 16:31)    Recommendations  Since AM FS borderline low, recommend lowering basal insulin to lantus 28 units QHS to avoid hypoglycemia. Will continue to monitor.    Chris Henderson, PharmD  PGY1 Pharmacy Resident   Available on [facility]  SpectraLink: [id number]     RH is a 78y M with Type 2 DM, being managed on lantus 34 units QHS. Patient's POCT glucose is as follows....    CAPILLARY BLOOD GLUCOSE    POCT Blood Glucose.: 172 mg/dL (22 Nov 2023 11:56)  POCT Blood Glucose.: 70 mg/dL (22 Nov 2023 08:46)  POCT Blood Glucose.: 179 mg/dL (21 Nov 2023 21:35)  POCT Blood Glucose.: 186 mg/dL (21 Nov 2023 16:31)    Recommendations  Since AM FS borderline low, recommend lowering basal insulin to lantus 28 units QHS to avoid hypoglycemia. Will continue to monitor FS.    Chris Henderson, PharmD  PGY1 Pharmacy Resident   Available on [facility]  SpectraLink: [phone]

## 2023-11-22 NOTE — DISCHARGE NOTE NURSING/CASE MANAGEMENT/SOCIAL WORK - NSDCVIVACCINE_GEN_ALL_CORE_FT
Tdap; 19-Nov-2023 17:18; Jaqueline Macias (RN); [employer]; [id number] (Exp. Date: 20-Jul-2025); IntraMuscular; Deltoid Left.; 0.5 milliLiter(s); VIS (VIS Published: 09-May-2013, VIS Presented: 19-Nov-2023);

## 2023-11-22 NOTE — PROGRESS NOTE ADULT - SUBJECTIVE AND OBJECTIVE BOX
CARDIOLOGY FOLLOW UP - Dr. Head  DATE OF SERVICE: 11/21/23    CC  No CV complaints    REVIEW OF SYSTEMS:  CONSTITUTIONAL: No fever, weight loss, or fatigue  RESPIRATORY: No cough, wheezing, chills or hemoptysis; No Shortness of Breath  CARDIOVASCULAR: No chest pain, palpitations, passing out, dizziness, or leg swelling  GASTROINTESTINAL: No abdominal or epigastric pain. No nausea, vomiting, or hematemesis; No diarrhea or constipation. No melena or hematochezia.  VASCULAR: No edema     PHYSICAL EXAM:  T(C): 36.6 (11-21-23 @ 04:11), Max: 36.8 (11-20-23 @ 20:45)  HR: 68 (11-21-23 @ 04:11) (63 - 72)  BP: 110/60 (11-21-23 @ 04:11) (110/56 - 135/53)  RR: 18 (11-21-23 @ 04:11) (18 - 18)  SpO2: 93% (11-21-23 @ 04:11) (93% - 96%)  Wt(kg): --  I&O's Summary    20 Nov 2023 07:01  -  21 Nov 2023 07:00  --------------------------------------------------------  IN: 1280 mL / OUT: 1800 mL / NET: -520 mL    21 Nov 2023 07:01  -  21 Nov 2023 10:01  --------------------------------------------------------  IN: 0 mL / OUT: 1400 mL / NET: -1400 mL        Appearance: Normal	  Cardiovascular: Normal S1 S2,RRR, No JVD, No murmurs  Respiratory: Lungs clear to auscultation	  Gastrointestinal:  Soft, Non-tender, + BS	  Extremities: Normal range of motion, No clubbing, cyanosis or edema      Home Medications:  Allegra 60 mg oral tablet: 1 tab(s) orally once a day (19 Nov 2023 17:35)  ambrisentan 10 mg oral tablet: 1 tab(s) orally once a day (19 Nov 2023 17:37)  aspirin 81 mg oral delayed release tablet: 1 tab(s) orally once a day (19 Nov 2023 17:35)  Atrovent HFA 17 mcg/inh inhalation aerosol: 2 puff(s) by metered dose inhaler 3 times a day as needed (19 Nov 2023 17:37)  Benadryl 25 mg oral tablet: 2 tab(s) orally once a day (19 Nov 2023 17:35)  calcitriol 0.25 mcg oral capsule: 1 cap(s) orally 2 times a week (19 Nov 2023 17:37)  carvedilol 3.125 mg oral tablet: 1 tab(s) orally 2 times a day (19 Nov 2023 17:37)  cholecalciferol 1250 mcg (50,000 intl units) oral capsule: 1 cap(s) orally 2 times a week (Tues &amp; Thurs) (19 Nov 2023 17:37)  Eliquis 5 mg oral tablet: 1 tab(s) orally once a day (19 Nov 2023 17:37)  escitalopram 10 mg oral tablet: 1 tab(s) orally once a day (19 Nov 2023 17:37)  ferrous sulfate 325 mg (65 mg elemental iron) oral delayed release tablet: 1 tab(s) orally once a day (19 Nov 2023 17:37)  furosemide 20 mg oral tablet: 1 tab(s) orally once a day (19 Nov 2023 17:37)  Jardiance 25 mg oral tablet: 1 tab(s) orally once a day (19 Nov 2023 17:37)  Lantus 100 units/mL subcutaneous solution: 42 unit(s) subcutaneous once a day (19 Nov 2023 17:37)  Nephro-Reinier oral tablet: 1 tab(s) orally once a day (19 Nov 2023 17:37)  ranolazine 1000 mg oral tablet, extended release: 1 tab(s) orally 2 times a day (19 Nov 2023 17:37)  rosuvastatin 10 mg oral tablet: 1 tab(s) orally once a day (at bedtime) (19 Nov 2023 17:37)  sildenafil 20 mg oral tablet: 3 tab(s) orally 3 times a day (20 Nov 2023 14:37)  tamsulosin 0.4 mg oral capsule: 1 cap(s) orally once a day (19 Nov 2023 17:37)      MEDICATIONS  (STANDING):  ambrisentan 10 milliGRAM(s) Oral daily  apixaban 5 milliGRAM(s) Oral two times a day  aspirin enteric coated 81 milliGRAM(s) Oral daily  atorvastatin 40 milliGRAM(s) Oral at bedtime  calcitriol   Capsule 0.25 MICROGram(s) Oral daily  carvedilol 3.125 milliGRAM(s) Oral every 12 hours  chlorhexidine 2% Cloths 1 Application(s) Topical <User Schedule>  dextrose 50% Injectable 25 Gram(s) IV Push once  dextrose 50% Injectable 25 Gram(s) IV Push once  dextrose 50% Injectable 12.5 Gram(s) IV Push once  dextrose Oral Gel 15 Gram(s) Oral once  escitalopram 10 milliGRAM(s) Oral daily  ferrous    sulfate 325 milliGRAM(s) Oral daily  furosemide   Injectable 40 milliGRAM(s) IV Push two times a day  glucagon  Injectable 1 milliGRAM(s) IntraMuscular once  insulin glargine Injectable (LANTUS) 34 Unit(s) SubCutaneous at bedtime  insulin lispro (ADMELOG) corrective regimen sliding scale   SubCutaneous at bedtime  insulin lispro (ADMELOG) corrective regimen sliding scale   SubCutaneous three times a day before meals  ipratropium 17 MICROgram(s) HFA Inhaler 1 Puff(s) Inhalation every 6 hours  loratadine 10 milliGRAM(s) Oral daily  Nephro-reinier 1 Tablet(s) Oral daily  ranolazine 1000 milliGRAM(s) Oral two times a day  sildenafil (REVATIO) 20 milliGRAM(s) Oral three times a day  tamsulosin 0.4 milliGRAM(s) Oral at bedtime      TELEMETRY: NSR 60-70	    ECG:  	  RADIOLOGY:   DIAGNOSTIC TESTING:  [x] Echocardiogram:  < from: TTE W or WO Ultrasound Enhancing Agent (11.20.23 @ 12:19) >  CONCLUSIONS:      1. The interventricular septum is flattened in systole and diastole consistent with right ventricular pressure and volume overload. Left ventricular systolic function is normal.   2. Severely enlarged right ventricular cavity size, wall thickness, and moderately reduced systolic function. Tricuspid annular plane systolic excursion (TAPSE) is 1.2 cm (normal >=1.7 cm).   3. The right atrium is severely dilated in size.   4. No pericardial effusion seen.   5. Moderate to severe tricuspid regurgitation.   6. Estimated pulmonary artery systolic pressure is 74 mmHg, consistent with severe pulmonary hypertension.   7. No prior echocardiogram is available for comparison.   8. Technically difficult image quality.   9. The inferior vena cava is dilated measuring 2.30 cm in diameter, (dilated >2.1cm) with abnormal inspiratory collapse (abnormal <50%) consistent with elevated right atrial pressure (~15, range 10-20mmHg).  10. There is normal LV mass and normal geometry.    < end of copied text >    [ ]  Catheterization:  [ ] Stress Test:    OTHER: 	    LABS:	 	    Troponin T, High Sensitivity Result: 85 ng/L [0 - 51] (11-19 @ 19:40)  Troponin T, High Sensitivity Result: 90 ng/L [0 - 51] (11-19 @ 13:20)                          12.5   4.99  )-----------( 135      ( 21 Nov 2023 05:47 )             37.8     11-21    139  |  104  |  57<H>  ----------------------------<  101<H>  4.1   |  22  |  3.35<H>    Ca    9.0      21 Nov 2023 05:47  Phos  4.5     11-21  Mg     2.2     11-21    TPro  5.6<L>  /  Alb  3.4  /  TBili  0.7  /  DBili  x   /  AST  21  /  ALT  26  /  AlkPhos  90  11-21            
SUBJECTIVE / OVERNIGHT EVENTS:    patient seen and examined  resting comfortably OOB in chair having breakfast  breathing well on RA  AM orthos pending  Cr elevated this AM  --------------------------------------------------------------------------------------------  LABS:                        12.4   5.01  )-----------( 136      ( 22 Nov 2023 06:48 )             37.5     11-22    138  |  103  |  56<H>  ----------------------------<  77  4.1   |  22  |  3.50<H>    Ca    8.7      22 Nov 2023 06:49  Phos  4.5     11-21  Mg     2.2     11-21    TPro  5.6<L>  /  Alb  3.4  /  TBili  0.7  /  DBili  x   /  AST  21  /  ALT  26  /  AlkPhos  90  11-21      CAPILLARY BLOOD GLUCOSE      POCT Blood Glucose.: 70 mg/dL (22 Nov 2023 08:46)  POCT Blood Glucose.: 179 mg/dL (21 Nov 2023 21:35)  POCT Blood Glucose.: 186 mg/dL (21 Nov 2023 16:31)  POCT Blood Glucose.: 97 mg/dL (21 Nov 2023 12:14)        Urinalysis Basic - ( 22 Nov 2023 06:49 )    Color: x / Appearance: x / SG: x / pH: x  Gluc: 77 mg/dL / Ketone: x  / Bili: x / Urobili: x   Blood: x / Protein: x / Nitrite: x   Leuk Esterase: x / RBC: x / WBC x   Sq Epi: x / Non Sq Epi: x / Bacteria: x        RADIOLOGY & ADDITIONAL TESTS:    Imaging Personally Reviewed:  [x] YES  [ ] NO    Consultant(s) Notes Reviewed:  [x] YES  [ ] NO    MEDICATIONS  (STANDING):  ambrisentan 10 milliGRAM(s) Oral daily  apixaban 5 milliGRAM(s) Oral two times a day  aspirin enteric coated 81 milliGRAM(s) Oral daily  atorvastatin 40 milliGRAM(s) Oral at bedtime  calcitriol   Capsule 0.25 MICROGram(s) Oral daily  chlorhexidine 2% Cloths 1 Application(s) Topical <User Schedule>  dextrose 50% Injectable 12.5 Gram(s) IV Push once  dextrose 50% Injectable 25 Gram(s) IV Push once  dextrose 50% Injectable 25 Gram(s) IV Push once  dextrose Oral Gel 15 Gram(s) Oral once  escitalopram 10 milliGRAM(s) Oral daily  ferrous    sulfate 325 milliGRAM(s) Oral daily  furosemide   Injectable 40 milliGRAM(s) IV Push two times a day  glucagon  Injectable 1 milliGRAM(s) IntraMuscular once  insulin glargine Injectable (LANTUS) 34 Unit(s) SubCutaneous at bedtime  insulin lispro (ADMELOG) corrective regimen sliding scale   SubCutaneous three times a day before meals  insulin lispro (ADMELOG) corrective regimen sliding scale   SubCutaneous at bedtime  ipratropium 17 MICROgram(s) HFA Inhaler 1 Puff(s) Inhalation every 6 hours  loratadine 10 milliGRAM(s) Oral daily  metoprolol tartrate 12.5 milliGRAM(s) Oral two times a day  Nephro-reinier 1 Tablet(s) Oral daily  ranolazine 1000 milliGRAM(s) Oral two times a day  sildenafil (REVATIO) 20 milliGRAM(s) Oral three times a day  tamsulosin 0.4 milliGRAM(s) Oral at bedtime    MEDICATIONS  (PRN):  acetaminophen     Tablet .. 975 milliGRAM(s) Oral every 6 hours PRN Temp greater or equal to 38C (100.4F), Mild Pain (1 - 3)  diphenhydrAMINE 25 milliGRAM(s) Oral two times a day PRN Rash and/or Itching  melatonin 3 milliGRAM(s) Oral at bedtime PRN Insomnia      Care Discussed with Consultants/Other Providers [x] YES  [ ] NO    Vital Signs Last 24 Hrs  T(C): 36.7 (22 Nov 2023 05:37), Max: 36.7 (21 Nov 2023 11:51)  T(F): 98.1 (22 Nov 2023 05:37), Max: 98.1 (21 Nov 2023 11:51)  HR: 65 (22 Nov 2023 05:37) (65 - 73)  BP: 108/66 (22 Nov 2023 05:37) (106/59 - 121/71)  BP(mean): --  RR: 18 (21 Nov 2023 20:58) (18 - 19)  SpO2: 92% (21 Nov 2023 20:58) (92% - 93%)    Parameters below as of 21 Nov 2023 20:58  Patient On (Oxygen Delivery Method): room air      I&O's Summary    21 Nov 2023 07:01  -  22 Nov 2023 07:00  --------------------------------------------------------  IN: 1000 mL / OUT: 4000 mL / NET: -3000 mL    22 Nov 2023 07:01  -  22 Nov 2023 09:53  --------------------------------------------------------  IN: 0 mL / OUT: 750 mL / NET: -750 mL    PHYSICAL EXAM:  GENERAL: NAD, well-developed, comfortable  HEAD:  Atraumatic, Normocephalic  EYES: EOMI, PERRLA, conjunctiva and sclera clear  NECK: Supple, No JVD  CHEST/LUNG: Diminished bilaterally; No wheeze  HEART: Regular rate and rhythm; No murmurs, rubs, or gallops  ABDOMEN: Soft, Nontender, distended; Bowel sounds present  NEURO: AAOx3, no focal weakness, 5/5 b/l extremity strength, b/l knee no arthritis, no effusion   EXTREMITIES:  2+ Peripheral Pulses,+ BL LE edema  SKIN: No rashes or lesions, + abrasions      
SUBJECTIVE / OVERNIGHT EVENTS:    patient seen and examined  resting comfortably in bed  no c/o at this time    --------------------------------------------------------------------------------------------  LABS:                        12.5   4.99  )-----------( 135      ( 21 Nov 2023 05:47 )             37.8     11-21    139  |  104  |  57<H>  ----------------------------<  101<H>  4.1   |  22  |  3.35<H>    Ca    9.0      21 Nov 2023 05:47  Phos  4.5     11-21  Mg     2.2     11-21    TPro  5.6<L>  /  Alb  3.4  /  TBili  0.7  /  DBili  x   /  AST  21  /  ALT  26  /  AlkPhos  90  11-21      CAPILLARY BLOOD GLUCOSE      POCT Blood Glucose.: 97 mg/dL (21 Nov 2023 07:34)  POCT Blood Glucose.: 165 mg/dL (20 Nov 2023 21:26)  POCT Blood Glucose.: 175 mg/dL (20 Nov 2023 16:42)  POCT Blood Glucose.: 132 mg/dL (20 Nov 2023 12:59)        Urinalysis Basic - ( 21 Nov 2023 05:47 )    Color: x / Appearance: x / SG: x / pH: x  Gluc: 101 mg/dL / Ketone: x  / Bili: x / Urobili: x   Blood: x / Protein: x / Nitrite: x   Leuk Esterase: x / RBC: x / WBC x   Sq Epi: x / Non Sq Epi: x / Bacteria: x        RADIOLOGY & ADDITIONAL TESTS:    Imaging Personally Reviewed:  [x] YES  [ ] NO    Consultant(s) Notes Reviewed:  [x] YES  [ ] NO    MEDICATIONS  (STANDING):  ambrisentan 10 milliGRAM(s) Oral daily  apixaban 5 milliGRAM(s) Oral two times a day  aspirin enteric coated 81 milliGRAM(s) Oral daily  atorvastatin 40 milliGRAM(s) Oral at bedtime  calcitriol   Capsule 0.25 MICROGram(s) Oral daily  carvedilol 3.125 milliGRAM(s) Oral every 12 hours  chlorhexidine 2% Cloths 1 Application(s) Topical <User Schedule>  dextrose 50% Injectable 25 Gram(s) IV Push once  dextrose 50% Injectable 25 Gram(s) IV Push once  dextrose 50% Injectable 12.5 Gram(s) IV Push once  dextrose Oral Gel 15 Gram(s) Oral once  escitalopram 10 milliGRAM(s) Oral daily  ferrous    sulfate 325 milliGRAM(s) Oral daily  furosemide   Injectable 40 milliGRAM(s) IV Push two times a day  glucagon  Injectable 1 milliGRAM(s) IntraMuscular once  insulin glargine Injectable (LANTUS) 34 Unit(s) SubCutaneous at bedtime  insulin lispro (ADMELOG) corrective regimen sliding scale   SubCutaneous at bedtime  insulin lispro (ADMELOG) corrective regimen sliding scale   SubCutaneous three times a day before meals  ipratropium 17 MICROgram(s) HFA Inhaler 1 Puff(s) Inhalation every 6 hours  loratadine 10 milliGRAM(s) Oral daily  Nephro-reinier 1 Tablet(s) Oral daily  ranolazine 1000 milliGRAM(s) Oral two times a day  sildenafil (REVATIO) 20 milliGRAM(s) Oral three times a day  tamsulosin 0.4 milliGRAM(s) Oral at bedtime    MEDICATIONS  (PRN):  acetaminophen     Tablet .. 975 milliGRAM(s) Oral every 6 hours PRN Temp greater or equal to 38C (100.4F), Mild Pain (1 - 3)  diphenhydrAMINE 25 milliGRAM(s) Oral two times a day PRN Rash and/or Itching  melatonin 3 milliGRAM(s) Oral at bedtime PRN Insomnia      Care Discussed with Consultants/Other Providers [x] YES  [ ] NO    Vital Signs Last 24 Hrs  T(C): 36.6 (21 Nov 2023 04:11), Max: 36.8 (20 Nov 2023 20:45)  T(F): 97.8 (21 Nov 2023 04:11), Max: 98.2 (20 Nov 2023 20:45)  HR: 68 (21 Nov 2023 04:11) (63 - 72)  BP: 110/60 (21 Nov 2023 04:11) (110/56 - 135/53)  BP(mean): --  RR: 18 (21 Nov 2023 04:11) (18 - 18)  SpO2: 93% (21 Nov 2023 04:11) (93% - 96%)    Parameters below as of 21 Nov 2023 04:11  Patient On (Oxygen Delivery Method): nasal cannula  O2 Flow (L/min): 2    I&O's Summary    20 Nov 2023 07:01  -  21 Nov 2023 07:00  --------------------------------------------------------  IN: 1280 mL / OUT: 1800 mL / NET: -520 mL    21 Nov 2023 07:01  -  21 Nov 2023 10:28  --------------------------------------------------------  IN: 0 mL / OUT: 1400 mL / NET: -1400 mL    PHYSICAL EXAM:  GENERAL: NAD, well-developed, comfortable  HEAD:  Atraumatic, Normocephalic  EYES: EOMI, PERRLA, conjunctiva and sclera clear  NECK: Supple, No JVD  CHEST/LUNG: Diminished bilaterally; No wheeze  HEART: Regular rate and rhythm; No murmurs, rubs, or gallops  ABDOMEN: Soft, Nontender, distended; Bowel sounds present  NEURO: AAOx3, no focal weakness, 5/5 b/l extremity strength, b/l knee no arthritis, no effusion   EXTREMITIES:  2+ Peripheral Pulses,+ BL LE edema. + right hand pain  SKIN: No rashes or lesions, + abrasions        
CARDIOLOGY FOLLOW UP - Dr. Head  DATE OF SERVICE: 11/22/23    CC  Endorsing dizziness while standing yesterday  Overall feeling better today, no longer sob    REVIEW OF SYSTEMS:  CONSTITUTIONAL: No fever, weight loss, or fatigue  RESPIRATORY: No cough, wheezing, chills or hemoptysis; No Shortness of Breath  CARDIOVASCULAR: No chest pain, palpitations, passing out, dizziness, or leg swelling  GASTROINTESTINAL: No abdominal or epigastric pain. No nausea, vomiting, or hematemesis; No diarrhea or constipation. No melena or hematochezia.  VASCULAR: No edema     PHYSICAL EXAM:  T(C): 36.7 (11-22-23 @ 05:37), Max: 36.7 (11-21-23 @ 11:51)  HR: 65 (11-22-23 @ 05:37) (65 - 73)  BP: 108/66 (11-22-23 @ 05:37) (106/59 - 121/71)  RR: 18 (11-21-23 @ 20:58) (18 - 19)  SpO2: 92% (11-21-23 @ 20:58) (92% - 93%)  Wt(kg): --  I&O's Summary    21 Nov 2023 07:01  -  22 Nov 2023 07:00  --------------------------------------------------------  IN: 1000 mL / OUT: 4000 mL / NET: -3000 mL    22 Nov 2023 07:01  -  22 Nov 2023 11:35  --------------------------------------------------------  IN: 0 mL / OUT: 750 mL / NET: -750 mL        Appearance: Normal	  Cardiovascular: Normal S1 S2,RRR, No JVD, No murmurs  Respiratory: Lungs clear to auscultation b/l   Gastrointestinal:  Soft, Non-tender, + BS	  Extremities: Normal range of motion, No clubbing, cyanosis or edema      Home Medications:  Allegra 60 mg oral tablet: 1 tab(s) orally once a day (19 Nov 2023 17:35)  ambrisentan 10 mg oral tablet: 1 tab(s) orally once a day (19 Nov 2023 17:37)  aspirin 81 mg oral delayed release tablet: 1 tab(s) orally once a day (19 Nov 2023 17:35)  Atrovent HFA 17 mcg/inh inhalation aerosol: 2 puff(s) by metered dose inhaler 3 times a day as needed (19 Nov 2023 17:37)  Benadryl 25 mg oral tablet: 2 tab(s) orally once a day (19 Nov 2023 17:35)  calcitriol 0.25 mcg oral capsule: 1 cap(s) orally 2 times a week (19 Nov 2023 17:37)  carvedilol 3.125 mg oral tablet: 1 tab(s) orally 2 times a day (19 Nov 2023 17:37)  cholecalciferol 1250 mcg (50,000 intl units) oral capsule: 1 cap(s) orally 2 times a week (Tues &amp; Thurs) (19 Nov 2023 17:37)  Eliquis 5 mg oral tablet: 1 tab(s) orally once a day (19 Nov 2023 17:37)  escitalopram 10 mg oral tablet: 1 tab(s) orally once a day (19 Nov 2023 17:37)  ferrous sulfate 325 mg (65 mg elemental iron) oral delayed release tablet: 1 tab(s) orally once a day (19 Nov 2023 17:37)  furosemide 20 mg oral tablet: 1 tab(s) orally once a day (19 Nov 2023 17:37)  Jardiance 25 mg oral tablet: 1 tab(s) orally once a day (19 Nov 2023 17:37)  Lantus 100 units/mL subcutaneous solution: 42 unit(s) subcutaneous once a day (19 Nov 2023 17:37)  Nephro-Reinier oral tablet: 1 tab(s) orally once a day (19 Nov 2023 17:37)  ranolazine 1000 mg oral tablet, extended release: 1 tab(s) orally 2 times a day (19 Nov 2023 17:37)  rosuvastatin 10 mg oral tablet: 1 tab(s) orally once a day (at bedtime) (19 Nov 2023 17:37)  sildenafil 20 mg oral tablet: 3 tab(s) orally 3 times a day (20 Nov 2023 14:37)  tamsulosin 0.4 mg oral capsule: 1 cap(s) orally once a day (19 Nov 2023 17:37)      MEDICATIONS  (STANDING):  ambrisentan 10 milliGRAM(s) Oral daily  apixaban 5 milliGRAM(s) Oral two times a day  aspirin enteric coated 81 milliGRAM(s) Oral daily  atorvastatin 40 milliGRAM(s) Oral at bedtime  calcitriol   Capsule 0.25 MICROGram(s) Oral daily  chlorhexidine 2% Cloths 1 Application(s) Topical <User Schedule>  dextrose 50% Injectable 12.5 Gram(s) IV Push once  dextrose 50% Injectable 25 Gram(s) IV Push once  dextrose 50% Injectable 25 Gram(s) IV Push once  dextrose Oral Gel 15 Gram(s) Oral once  escitalopram 10 milliGRAM(s) Oral daily  ferrous    sulfate 325 milliGRAM(s) Oral daily  furosemide   Injectable 20 milliGRAM(s) IV Push once  glucagon  Injectable 1 milliGRAM(s) IntraMuscular once  insulin glargine Injectable (LANTUS) 34 Unit(s) SubCutaneous at bedtime  insulin lispro (ADMELOG) corrective regimen sliding scale   SubCutaneous three times a day before meals  insulin lispro (ADMELOG) corrective regimen sliding scale   SubCutaneous at bedtime  ipratropium 17 MICROgram(s) HFA Inhaler 1 Puff(s) Inhalation every 6 hours  loratadine 10 milliGRAM(s) Oral daily  metoprolol tartrate 12.5 milliGRAM(s) Oral two times a day  Nephro-reinier 1 Tablet(s) Oral daily  ranolazine 1000 milliGRAM(s) Oral two times a day  sildenafil (REVATIO) 20 milliGRAM(s) Oral three times a day  tamsulosin 0.4 milliGRAM(s) Oral at bedtime      TELEMETRY: NSR, 1st degree avb 70s    ECG:  	  RADIOLOGY:   DIAGNOSTIC TESTING:  [ ] Echocardiogram:  [ ]  Catheterization:  [ ] Stress Test:    OTHER: 	    LABS:	 	    Troponin T, High Sensitivity Result: 85 ng/L [0 - 51] (11-19 @ 19:40)  Troponin T, High Sensitivity Result: 90 ng/L [0 - 51] (11-19 @ 13:20)                          12.4   5.01  )-----------( 136      ( 22 Nov 2023 06:48 )             37.5     11-22    138  |  103  |  56<H>  ----------------------------<  77  4.1   |  22  |  3.50<H>    Ca    8.7      22 Nov 2023 06:49  Phos  4.5     11-21  Mg     2.2     11-21    TPro  5.6<L>  /  Alb  3.4  /  TBili  0.7  /  DBili  x   /  AST  21  /  ALT  26  /  AlkPhos  90  11-21            
[facility]   [phone]  RENAL FOLLOW UP NOTE  --------------------------------------------------------------------------------  Chief Complaint:    24 hour events/subjective:    patient seen and examined.   states his breathing is improving  denies dizziness     PAST HISTORY  --------------------------------------------------------------------------------  No significant changes to PMH, PSH, FHx, SHx, unless otherwise noted    ALLERGIES & MEDICATIONS  --------------------------------------------------------------------------------  Allergies    penicillins (Hives; Rash)  sulfa drugs (Rash)  rye bread (Rash)  pork (Rash)    Intolerances      Standing Inpatient Medications  ambrisentan 10 milliGRAM(s) Oral daily  apixaban 5 milliGRAM(s) Oral two times a day  aspirin enteric coated 81 milliGRAM(s) Oral daily  atorvastatin 40 milliGRAM(s) Oral at bedtime  calcitriol   Capsule 0.25 MICROGram(s) Oral daily  carvedilol 3.125 milliGRAM(s) Oral every 12 hours  chlorhexidine 2% Cloths 1 Application(s) Topical <User Schedule>  dextrose 50% Injectable 12.5 Gram(s) IV Push once  dextrose 50% Injectable 25 Gram(s) IV Push once  dextrose 50% Injectable 25 Gram(s) IV Push once  dextrose Oral Gel 15 Gram(s) Oral once  escitalopram 10 milliGRAM(s) Oral daily  ferrous    sulfate 325 milliGRAM(s) Oral daily  furosemide   Injectable 40 milliGRAM(s) IV Push two times a day  glucagon  Injectable 1 milliGRAM(s) IntraMuscular once  insulin glargine Injectable (LANTUS) 34 Unit(s) SubCutaneous at bedtime  insulin lispro (ADMELOG) corrective regimen sliding scale   SubCutaneous three times a day before meals  insulin lispro (ADMELOG) corrective regimen sliding scale   SubCutaneous at bedtime  ipratropium 17 MICROgram(s) HFA Inhaler 1 Puff(s) Inhalation every 6 hours  loratadine 10 milliGRAM(s) Oral daily  Nephro-reinier 1 Tablet(s) Oral daily  ranolazine 1000 milliGRAM(s) Oral two times a day  sildenafil (REVATIO) 20 milliGRAM(s) Oral three times a day  tamsulosin 0.4 milliGRAM(s) Oral at bedtime    PRN Inpatient Medications  acetaminophen     Tablet .. 975 milliGRAM(s) Oral every 6 hours PRN  diphenhydrAMINE 25 milliGRAM(s) Oral two times a day PRN  melatonin 3 milliGRAM(s) Oral at bedtime PRN      REVIEW OF SYSTEMS  --------------------------------------------------------------------------------    Gen: denies fevers/chills,  CVS: denies chest pain/palpitations  Resp: denies SOB/Cough  GI: Denies N/V/Abd pain  : Denies dysuria    VITALS/PHYSICAL EXAM  --------------------------------------------------------------------------------  T(C): 36.7 (11-21-23 @ 11:51), Max: 36.8 (11-20-23 @ 20:45)  HR: 73 (11-21-23 @ 11:51) (63 - 73)  BP: 117/67 (11-21-23 @ 11:51) (110/60 - 135/53)  RR: 19 (11-21-23 @ 12:21) (18 - 19)  SpO2: 93% (11-21-23 @ 12:21) (92% - 94%)  Wt(kg): --        11-20-23 @ 07:01  -  11-21-23 @ 07:00  --------------------------------------------------------  IN: 1280 mL / OUT: 1800 mL / NET: -520 mL    11-21-23 @ 07:01  -  11-21-23 @ 13:10  --------------------------------------------------------  IN: 360 mL / OUT: 2400 mL / NET: -2040 mL      Physical Exam:  	  	Gen: Non toxic comfortable appearing, on O2, scalp abbrasion   	Pulm: decrease bs  no rales or ronchi or wheezing  	CV: +JVD. RRR, S1S2; no rub  	Abd: +BS, soft, nontender/nondistended  	: No suprapubic tenderness  	UE: Warm, no cyanosis  no clubbing,  no edema;   	LE: Warm, no cyanosis  no clubbing, B/L 2- edema    LABS/STUDIES  --------------------------------------------------------------------------------              12.5   4.99  >-----------<  135      [11-21-23 @ 05:47]              37.8     139  |  104  |  57  ----------------------------<  101      [11-21-23 @ 05:47]  4.1   |  22  |  3.35        Ca     9.0     [11-21-23 @ 05:47]      Mg     2.2     [11-21-23 @ 05:47]      Phos  4.5     [11-21-23 @ 05:47]    TPro  5.6  /  Alb  3.4  /  TBili  0.7  /  DBili  x   /  AST  21  /  ALT  26  /  AlkPhos  90  [11-21-23 @ 05:47]          Creatinine Trend:  SCr 3.35 [11-21 @ 05:47]  SCr 3.36 [11-20 @ 06:22]  SCr 3.43 [11-19 @ 13:20]          HbA1c 7.3      [02-12-19 @ 07:27]      
[facility] HYPERTENSION   [phone]  RENAL FOLLOW UP NOTE  --------------------------------------------------------------------------------  Chief Complaint:    24 hour events/subjective:    patient seen and examined.   states his breathing is much better overall  lightheaded when ambulated with PT    PAST HISTORY  --------------------------------------------------------------------------------  No significant changes to PMH, PSH, FHx, SHx, unless otherwise noted    ALLERGIES & MEDICATIONS  --------------------------------------------------------------------------------  Allergies    penicillins (Hives; Rash)  sulfa drugs (Rash)  rye bread (Rash)  pork (Rash)    Intolerances      Standing Inpatient Medications  ambrisentan 10 milliGRAM(s) Oral daily  apixaban 5 milliGRAM(s) Oral two times a day  aspirin enteric coated 81 milliGRAM(s) Oral daily  atorvastatin 40 milliGRAM(s) Oral at bedtime  calcitriol   Capsule 0.25 MICROGram(s) Oral daily  chlorhexidine 2% Cloths 1 Application(s) Topical <User Schedule>  dextrose 50% Injectable 25 Gram(s) IV Push once  dextrose 50% Injectable 12.5 Gram(s) IV Push once  dextrose 50% Injectable 25 Gram(s) IV Push once  dextrose Oral Gel 15 Gram(s) Oral once  escitalopram 10 milliGRAM(s) Oral daily  ferrous    sulfate 325 milliGRAM(s) Oral daily  furosemide   Injectable 20 milliGRAM(s) IV Push once  glucagon  Injectable 1 milliGRAM(s) IntraMuscular once  insulin glargine Injectable (LANTUS) 34 Unit(s) SubCutaneous at bedtime  insulin lispro (ADMELOG) corrective regimen sliding scale   SubCutaneous three times a day before meals  insulin lispro (ADMELOG) corrective regimen sliding scale   SubCutaneous at bedtime  ipratropium 17 MICROgram(s) HFA Inhaler 1 Puff(s) Inhalation every 6 hours  loratadine 10 milliGRAM(s) Oral daily  metoprolol tartrate 12.5 milliGRAM(s) Oral two times a day  Nephro-reinier 1 Tablet(s) Oral daily  ranolazine 1000 milliGRAM(s) Oral two times a day  sildenafil (REVATIO) 20 milliGRAM(s) Oral three times a day  tamsulosin 0.4 milliGRAM(s) Oral at bedtime    PRN Inpatient Medications  acetaminophen     Tablet .. 975 milliGRAM(s) Oral every 6 hours PRN  diphenhydrAMINE 25 milliGRAM(s) Oral two times a day PRN  melatonin 3 milliGRAM(s) Oral at bedtime PRN      REVIEW OF SYSTEMS  --------------------------------------------------------------------------------    Gen: denies fevers/chills,  CVS: denies chest pain/palpitations  Resp: denies SOB/Cough  GI: Denies N/V/Abd pain  : Denies dysuria/oliguria/hematuria    VITALS/PHYSICAL EXAM  --------------------------------------------------------------------------------  T(C): 36.7 (11-22-23 @ 05:37), Max: 36.7 (11-21-23 @ 11:51)  HR: 65 (11-22-23 @ 05:37) (65 - 73)  BP: 108/66 (11-22-23 @ 05:37) (106/59 - 121/71)  RR: 18 (11-21-23 @ 20:58) (18 - 19)  SpO2: 92% (11-21-23 @ 20:58) (92% - 93%)  Wt(kg): --        11-21-23 @ 07:01  -  11-22-23 @ 07:00  --------------------------------------------------------  IN: 1000 mL / OUT: 4000 mL / NET: -3000 mL    11-22-23 @ 07:01  -  11-22-23 @ 11:15  --------------------------------------------------------  IN: 0 mL / OUT: 750 mL / NET: -750 mL      Physical Exam:  	  	Gen: Non toxic comfortable appearing, on O2, scalp abbrasion   	Pulm: decrease bs  no rales or ronchi or wheezing  	CV: +mild JVD. RRR, S1S2; no rub  	Abd: +BS, soft, nontender/nondistended  	: No suprapubic tenderness  	UE: Warm, no cyanosis  no clubbing,  no edema;   	LE: Warm, no cyanosis  no clubbing, B/L 2- edema    LABS/STUDIES  --------------------------------------------------------------------------------              12.4   5.01  >-----------<  136      [11-22-23 @ 06:48]              37.5     138  |  103  |  56  ----------------------------<  77      [11-22-23 @ 06:49]  4.1   |  22  |  3.50        Ca     8.7     [11-22-23 @ 06:49]      Mg     2.2     [11-21-23 @ 05:47]      Phos  4.5     [11-21-23 @ 05:47]    TPro  5.6  /  Alb  3.4  /  TBili  0.7  /  DBili  x   /  AST  21  /  ALT  26  /  AlkPhos  90  [11-21-23 @ 05:47]          Creatinine Trend:  SCr 3.50 [11-22 @ 06:49]  SCr 3.35 [11-21 @ 05:47]  SCr 3.36 [11-20 @ 06:22]  SCr 3.43 [11-19 @ 13:20]          Urinalysis (11.21.23 @ 15:49)   Glucose Qualitative, Urine: >=1000 mg/dL  Blood, Urine: Negative  pH Urine: 5.5  Color: Yellow  Urine Appearance: Clear  Bilirubin: Negative  Ketone - Urine: Negative mg/dL  Specific Gravity: 1.011  Protein, Urine: 30 mg/dL  Urobilinogen: 1.0 mg/dL  Nitrite: Negative  Leukocyte Esterase Concentration: Negative  Protein/Creatinine Ratio, Urine (11.21.23 @ 15:49)   Creatinine, Random Urine: 38: Reference Ranges have NOT been established for random urine analytes due   to variability in fluid intake and concentration. mg/dL  Total Protein, Random Urine: 43 mg/dL  Protein/Creatinine Ratio Calculation: 1.1 Ratio    Urine Creatinine 38      [11-21-23 @ 15:49]  Urine Protein 43      [11-21-23 @ 15:49]    PTH -- (Ca 8.5)      [11-22-23 @ 06:49]   187  HbA1c 7.3      [02-12-19 @ 07:27]      
SUBJECTIVE / OVERNIGHT EVENTS:      Patient seen and examined at bedside. No events noted overnight. Resting comfortably in bed    --------------------------------------------------------------------------------------------  LABS:                        12.1   5.30  )-----------( 132      ( 20 Nov 2023 06:24 )             36.6     11-20    139  |  104  |  56<H>  ----------------------------<  176<H>  3.8   |  20<L>  |  3.36<H>    Ca    8.8      20 Nov 2023 06:22  Phos  5.1     11-20  Mg     2.2     11-20    TPro  5.9<L>  /  Alb  3.8  /  TBili  0.7  /  DBili  x   /  AST  26  /  ALT  30  /  AlkPhos  92  11-19      CAPILLARY BLOOD GLUCOSE      POCT Blood Glucose.: 105 mg/dL (20 Nov 2023 07:47)  POCT Blood Glucose.: 222 mg/dL (20 Nov 2023 03:35)  POCT Blood Glucose.: 112 mg/dL (19 Nov 2023 23:09)  POCT Blood Glucose.: 60 mg/dL (19 Nov 2023 22:41)  POCT Blood Glucose.: 61 mg/dL (19 Nov 2023 22:28)        Urinalysis Basic - ( 20 Nov 2023 06:22 )    Color: x / Appearance: x / SG: x / pH: x  Gluc: 176 mg/dL / Ketone: x  / Bili: x / Urobili: x   Blood: x / Protein: x / Nitrite: x   Leuk Esterase: x / RBC: x / WBC x   Sq Epi: x / Non Sq Epi: x / Bacteria: x        RADIOLOGY & ADDITIONAL TESTS: < from: Xray Hand 3 Views, Right (11.19.23 @ 13:37) >  IMPRESSION:  Acute dislocation with associated avulsion fracture involving the right   fourth PIP joint as described above.    < end of copied text >  < from: Xray Chest 1 View AP/PA (11.19.23 @ 13:38) >  IMPRESSION:  No focal consolidations.    < end of copied text >  < from: Xray Elbow AP + Lateral, Right (11.19.23 @ 13:39) >    IMPRESSION:  No acute fracture or dislocation. No elbow joint effusion.    < end of copied text >  < from: CT Head No Cont (11.19.23 @ 14:44) >    IMPRESSION:    No acute intracranial hemorrhage, mass effect or CT evidence of an acute   infarct.    Periventricular white matter attenuation changes consistent with chronic   microvascular ischemia as seen on the previous exam.    < end of copied text >  < from: Xray Hand 3 Views, Right (11.19.23 @ 16:59) >    IMPRESSION:  Status post reduction with improved anatomical alignment. Redemonstrated   tiny ossific flecks in the palmar aspect of the right ring finger PIP   joint which likely represents acute avulsion injury.    --- End of Report ---    < end of copied text >      Imaging Personally Reviewed:  [x] YES  [ ] NO    Consultant(s) Notes Reviewed:  [x] YES  [ ] NO    MEDICATIONS  (STANDING):  ambrisentan 10 milliGRAM(s) Oral daily  apixaban 5 milliGRAM(s) Oral two times a day  aspirin enteric coated 81 milliGRAM(s) Oral daily  atorvastatin 40 milliGRAM(s) Oral at bedtime  calcitriol   Capsule 0.25 MICROGram(s) Oral daily  carvedilol 3.125 milliGRAM(s) Oral every 12 hours  dextrose 50% Injectable 25 Gram(s) IV Push once  dextrose 50% Injectable 25 Gram(s) IV Push once  dextrose 50% Injectable 12.5 Gram(s) IV Push once  dextrose Oral Gel 15 Gram(s) Oral once  escitalopram 10 milliGRAM(s) Oral daily  ferrous    sulfate 325 milliGRAM(s) Oral daily  furosemide   Injectable 40 milliGRAM(s) IV Push two times a day  glucagon  Injectable 1 milliGRAM(s) IntraMuscular once  insulin glargine Injectable (LANTUS) 34 Unit(s) SubCutaneous at bedtime  insulin lispro (ADMELOG) corrective regimen sliding scale   SubCutaneous three times a day before meals  insulin lispro (ADMELOG) corrective regimen sliding scale   SubCutaneous at bedtime  ipratropium 17 MICROgram(s) HFA Inhaler 1 Puff(s) Inhalation every 6 hours  loratadine 10 milliGRAM(s) Oral daily  Nephro-reinier 1 Tablet(s) Oral daily  ranolazine 1000 milliGRAM(s) Oral two times a day  sildenafil (REVATIO) 20 milliGRAM(s) Oral three times a day  tamsulosin 0.4 milliGRAM(s) Oral at bedtime    MEDICATIONS  (PRN):  acetaminophen     Tablet .. 975 milliGRAM(s) Oral every 6 hours PRN Temp greater or equal to 38C (100.4F), Mild Pain (1 - 3)  diphenhydrAMINE 25 milliGRAM(s) Oral two times a day PRN Rash and/or Itching  melatonin 3 milliGRAM(s) Oral at bedtime PRN Insomnia      Care Discussed with Consultants/Other Providers [x] YES  [ ] NO    Vital Signs Last 24 Hrs  T(C): 36.6 (20 Nov 2023 05:45), Max: 36.8 (19 Nov 2023 21:26)  T(F): 97.9 (20 Nov 2023 05:45), Max: 98.3 (19 Nov 2023 21:26)  HR: 65 (20 Nov 2023 05:45) (63 - 71)  BP: 118/70 (20 Nov 2023 05:45) (102/62 - 137/72)  BP(mean): 94 (19 Nov 2023 20:55) (80 - 100)  RR: 20 (20 Nov 2023 05:45) (16 - 20)  SpO2: 92% (20 Nov 2023 05:45) (92% - 99%)    Parameters below as of 20 Nov 2023 05:45  Patient On (Oxygen Delivery Method): nasal cannula  O2 Flow (L/min): 2    I&O's Summary    19 Nov 2023 07:01  -  20 Nov 2023 07:00  --------------------------------------------------------  IN: 0 mL / OUT: 1350 mL / NET: -1350 mL      PHYSICAL EXAM:  GENERAL: NAD, well-developed, comfortable  HEAD:  Atraumatic, Normocephalic  EYES: EOMI, PERRLA, conjunctiva and sclera clear  NECK: Supple, No JVD  CHEST/LUNG: Diminished bilaterally; No wheeze  HEART: Regular rate and rhythm; No murmurs, rubs, or gallops  ABDOMEN: Soft, Nontender, distended; Bowel sounds present  NEURO: AAOx3, no focal weakness, 5/5 b/l extremity strength, b/l knee no arthritis, no effusion   EXTREMITIES:  2+ Peripheral Pulses,+ BL LE edema. + right hand pain  SKIN: No rashes or lesions, + abrasions

## 2023-11-22 NOTE — DISCHARGE NOTE NURSING/CASE MANAGEMENT/SOCIAL WORK - PATIENT PORTAL LINK FT
You can access the FollowMyHealth Patient Portal offered by [facility] by registering at the following website: [email or web address]. By joining [facility]’s FollowMyHealth portal, you will also be able to view your health information using other applications (apps) compatible with our system.

## 2023-11-22 NOTE — PROGRESS NOTE ADULT - TIME BILLING
agree with above  cv stable  Interrogate ppm  Echo w/ moderately reduced RV fxn, nml LV fxn, Severe TR, severe pHTN  Orthostatics positive  Allow permissive htn  PT for mobility  Continue 40mg iv lasix bid  Likely transition to PO lasix tomorrow
agree with above  cv stable  Interrogate ppm  Echo w/ moderately reduced RV fxn, nml LV fxn, Severe TR, severe pHTN  Orthostatics positive  Allow permissive htn  PT for mobility  change to po lasix

## 2023-11-22 NOTE — PROGRESS NOTE ADULT - ASSESSMENT
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     #Fall  -Etiology unclear   -Per pt, no cardiac prodrome prior  -EKG SR  -CTH no significant findings  -Tele monitor remains SR - cont to monitor  -PPM interrogation noted   -Echo w/ moderately reduced RV fxn, nml LV fxn, Severe TR, severe pHTN  -Orthostatics positive    #Orthostatic Hypotension  -Allow permissive htn  -PT for mobility  -Compression socks  -Avoid IVF as he is overloaded on exam    #HFrEF s/p ppm  -Overloaded on exam - improving with diuresis  -IV lasix dose held last night d/t positive orthostatics  -Can give 20mg iv lasix now  -Switch to 40mg po lasix qd  -Daily weight/I&Os  -Coreg switched to metoprolol to reduce orthostatic hypotension  -Continue to hold ace/arb i/s/o ckd   -Echo as above   -Interrogate device     #Orthostatic Hypotension  -Consider adding low dose midodrine 2.5mg tid  -Would not recommend IVF given overload on exam  -Repeat orthostatics    #CAD s/p stent  -Cont asa, statin, ranexa    #Severe pHTN  -s/p recent RHC  -Continue ambrisentan, sildenafil  -F/u pulm    #Hx DVT/PE  -Continue eliquis     #CKD   -Renal f/u     #R 4th finger dislocation s/p fall  -S/p reduction in ED

## 2023-11-22 NOTE — PROGRESS NOTE ADULT - REASON FOR ADMISSION
syncope, worsening CHUNG

## 2023-11-22 NOTE — PROGRESS NOTE ADULT - ASSESSMENT
78M hypertension, CAD s/p stent, HFrEF, severe pulmonary hypertension, diabetes, DVT/PE on Eliquis, CKD, s/p PPM ([employer]) presenting with fall in setting of acute on chronic dyspnea on exertion 2/2 HF/pHTN exacerbation.     Plan:    #Syncope: unclear etiology  - CT Head negative  - Device interrogated, report noted -> normal pacemaker function   - Monitor on tele  - Orthostatics positive   - Fall precautions  - PT eval    # Orthostatic Hypotension:  - Compression socks  - Avoid IVF as he is overloaded    # HFrEF exacerbation: BNP 6411  - Echo w/ moderately reduced RV fxn, nml LV fxn, Severe TR, severe pHTN  - Monitor I/O's. Daily weights  - Fluid restriction  - C/w diuresis per Cards  - Cards following    # R 4th finger dislocation s/p fall:  - XR w/ Acute dislocation with associated avulsion fracture involving the right   fourth PIP joint   - S/p reduction in ED  - Pain control    # CAD/ HTN/ HLD:  - Trend BP's  - C/w current meds    # CKD:  - Baseline Cr 3-3.5  - Monitor I/O's  - Serial Cr  - Avoid nephrotoxins  - Renally dose medications  - Renal following    # DM2:   - HgbA1c 7.0%  - Continue to monitor blood glucose levels  - Sliding Scale    # Depression  - C/w current meds    # Anemia:  - Serial cbc's  - Transfuse prn    # BPH:  - C/w current meds    # GI ppx:  - Bowel regimen prn    # DVT ppx:  - IPC's  - Eliquis    [employer]  [phone]       78M hypertension, CAD s/p stent, HFrEF, severe pulmonary hypertension, diabetes, DVT/PE on Eliquis, CKD, s/p PPM ([employer]) presenting with fall in setting of acute on chronic dyspnea on exertion 2/2 HF/pHTN exacerbation.     Plan:    #Syncope: unclear etiology  - CT Head negative  - Device interrogated, report noted -> normal pacemaker function   - Monitor on tele  - Orthostatics positive   - Fall precautions  - PT eval -> home PT    # Orthostatic Hypotension:  - Compression socks  - Avoid IVF as he is overloaded    # HFrEF exacerbation: BNP 6411  - Echo w/ moderately reduced RV fxn, nml LV fxn, Severe TR, severe pHTN  - Monitor I/O's. Daily weights  - Fluid restriction  - C/w diuresis per Cards  - Cards following    # R 4th finger dislocation s/p fall:  - XR w/ Acute dislocation with associated avulsion fracture involving the right   fourth PIP joint   - S/p reduction in ED  - Pain control    # CAD/ HTN/ HLD:  - Trend BP's  - C/w current meds    # CKD:  - Baseline Cr 3-3.5  - Monitor I/O's  - Serial Cr  - Avoid nephrotoxins  - Renally dose medications  - Renal following    # DM2:   - HgbA1c 7.0%  - Continue to monitor blood glucose levels  - Sliding Scale    # Depression  - C/w current meds    # Anemia:  - Serial cbc's  - Transfuse prn    # BPH:  - C/w current meds    # GI ppx:  - Bowel regimen prn    # DVT ppx:  - IPC's  - Eliquis    [employer]

## 2023-11-22 NOTE — PROGRESS NOTE ADULT - NS ATTEND AMEND GEN_ALL_CORE FT
Seen, examined with, formulated plan with and  agree with above as scribed by NP Sirisha [[place]]     lungs decrease bs no rales  heart RRR  ext no edema     CKD IV   CHF  orthostatic     ? midodrine addition  cr slightly higher but acceptable in setting of diuretics   cont lasix change to po after today   lasix as above

## 2023-11-26 ENCOUNTER — TRANSCRIPTION ENCOUNTER (OUTPATIENT)
Age: 78
End: 2023-11-26

## 2023-11-27 ENCOUNTER — TRANSCRIPTION ENCOUNTER (OUTPATIENT)
Age: 78
End: 2023-11-27

## 2023-11-28 ENCOUNTER — TRANSCRIPTION ENCOUNTER (OUTPATIENT)
Age: 78
End: 2023-11-28

## 2023-12-12 NOTE — PHYSICAL THERAPY INITIAL EVALUATION ADULT - ACTIVE RANGE OF MOTION EXAMINATION, REHAB EVAL
bilateral upper extremity Active ROM was WFL (within functional limits)/bilateral  lower extremity Active ROM was WFL (within functional limits)
<-- Click to add NO significant Past Surgical History

## 2023-12-13 PROCEDURE — 71045 X-RAY EXAM CHEST 1 VIEW: CPT

## 2023-12-13 PROCEDURE — 73130 X-RAY EXAM OF HAND: CPT

## 2023-12-13 PROCEDURE — 87640 STAPH A DNA AMP PROBE: CPT

## 2023-12-13 PROCEDURE — 84484 ASSAY OF TROPONIN QUANT: CPT

## 2023-12-13 PROCEDURE — 82570 ASSAY OF URINE CREATININE: CPT

## 2023-12-13 PROCEDURE — 83970 ASSAY OF PARATHORMONE: CPT

## 2023-12-13 PROCEDURE — 82435 ASSAY OF BLOOD CHLORIDE: CPT

## 2023-12-13 PROCEDURE — 87641 MR-STAPH DNA AMP PROBE: CPT

## 2023-12-13 PROCEDURE — 86803 HEPATITIS C AB TEST: CPT

## 2023-12-13 PROCEDURE — 82330 ASSAY OF CALCIUM: CPT

## 2023-12-13 PROCEDURE — 81001 URINALYSIS AUTO W/SCOPE: CPT

## 2023-12-13 PROCEDURE — 80053 COMPREHEN METABOLIC PANEL: CPT

## 2023-12-13 PROCEDURE — 82310 ASSAY OF CALCIUM: CPT

## 2023-12-13 PROCEDURE — 82947 ASSAY GLUCOSE BLOOD QUANT: CPT

## 2023-12-13 PROCEDURE — 85025 COMPLETE CBC W/AUTO DIFF WBC: CPT

## 2023-12-13 PROCEDURE — 93005 ELECTROCARDIOGRAM TRACING: CPT

## 2023-12-13 PROCEDURE — 94640 AIRWAY INHALATION TREATMENT: CPT

## 2023-12-13 PROCEDURE — 85027 COMPLETE CBC AUTOMATED: CPT

## 2023-12-13 PROCEDURE — 82962 GLUCOSE BLOOD TEST: CPT

## 2023-12-13 PROCEDURE — 70450 CT HEAD/BRAIN W/O DYE: CPT | Mod: MA

## 2023-12-13 PROCEDURE — 84132 ASSAY OF SERUM POTASSIUM: CPT

## 2023-12-13 PROCEDURE — 84295 ASSAY OF SERUM SODIUM: CPT

## 2023-12-13 PROCEDURE — 85014 HEMATOCRIT: CPT

## 2023-12-13 PROCEDURE — 80048 BASIC METABOLIC PNL TOTAL CA: CPT

## 2023-12-13 PROCEDURE — 97161 PT EVAL LOW COMPLEX 20 MIN: CPT

## 2023-12-13 PROCEDURE — 83605 ASSAY OF LACTIC ACID: CPT

## 2023-12-13 PROCEDURE — 84156 ASSAY OF PROTEIN URINE: CPT

## 2023-12-13 PROCEDURE — 83036 HEMOGLOBIN GLYCOSYLATED A1C: CPT

## 2023-12-13 PROCEDURE — 85018 HEMOGLOBIN: CPT

## 2023-12-13 PROCEDURE — 83735 ASSAY OF MAGNESIUM: CPT

## 2023-12-13 PROCEDURE — C8929: CPT

## 2023-12-13 PROCEDURE — 83880 ASSAY OF NATRIURETIC PEPTIDE: CPT

## 2023-12-13 PROCEDURE — 97165 OT EVAL LOW COMPLEX 30 MIN: CPT

## 2023-12-13 PROCEDURE — 90715 TDAP VACCINE 7 YRS/> IM: CPT

## 2023-12-13 PROCEDURE — 73070 X-RAY EXAM OF ELBOW: CPT

## 2023-12-13 PROCEDURE — 82803 BLOOD GASES ANY COMBINATION: CPT

## 2023-12-13 PROCEDURE — 99285 EMERGENCY DEPT VISIT HI MDM: CPT | Mod: 25

## 2023-12-13 PROCEDURE — 36415 COLL VENOUS BLD VENIPUNCTURE: CPT

## 2023-12-13 PROCEDURE — 84100 ASSAY OF PHOSPHORUS: CPT
